# Patient Record
Sex: FEMALE | Race: BLACK OR AFRICAN AMERICAN | NOT HISPANIC OR LATINO | Employment: FULL TIME | URBAN - METROPOLITAN AREA
[De-identification: names, ages, dates, MRNs, and addresses within clinical notes are randomized per-mention and may not be internally consistent; named-entity substitution may affect disease eponyms.]

---

## 2023-03-19 PROBLEM — R05.9 COUGH: Status: ACTIVE | Noted: 2023-03-19

## 2023-03-19 PROBLEM — I10 HTN (HYPERTENSION): Status: ACTIVE | Noted: 2023-03-19

## 2023-03-19 PROBLEM — M51.379 DEGENERATIVE DISC DISEASE AT L5-S1 LEVEL: Status: ACTIVE | Noted: 2023-03-19

## 2023-03-19 PROBLEM — N92.6 IRREGULAR MENSTRUAL CYCLE: Status: ACTIVE | Noted: 2023-03-19

## 2023-03-19 PROBLEM — N32.81 OVERACTIVE BLADDER: Status: ACTIVE | Noted: 2023-03-19

## 2023-03-19 PROBLEM — M25.552 HIP PAIN, LEFT: Status: ACTIVE | Noted: 2023-03-19

## 2023-03-19 PROBLEM — G89.29 CHRONIC LEFT-SIDED LOW BACK PAIN WITHOUT SCIATICA: Status: ACTIVE | Noted: 2023-03-19

## 2023-03-19 PROBLEM — D50.9 IRON DEFICIENCY ANEMIA: Status: ACTIVE | Noted: 2023-03-19

## 2023-03-19 PROBLEM — M25.562 KNEE PAIN, BILATERAL: Status: ACTIVE | Noted: 2023-03-19

## 2023-03-19 PROBLEM — M51.37 DEGENERATIVE DISC DISEASE AT L5-S1 LEVEL: Status: ACTIVE | Noted: 2023-03-19

## 2023-03-19 PROBLEM — K21.9 GERD (GASTROESOPHAGEAL REFLUX DISEASE): Status: ACTIVE | Noted: 2023-03-19

## 2023-03-19 PROBLEM — M17.11 PATELLOFEMORAL ARTHRITIS OF RIGHT KNEE: Status: ACTIVE | Noted: 2023-03-19

## 2023-03-19 PROBLEM — M54.16 LEFT LUMBAR RADICULOPATHY: Status: ACTIVE | Noted: 2023-03-19

## 2023-03-19 PROBLEM — N39.41 URGE INCONTINENCE OF URINE: Status: ACTIVE | Noted: 2023-03-19

## 2023-03-19 PROBLEM — M54.12 CERVICAL RADICULOPATHY: Status: ACTIVE | Noted: 2023-03-19

## 2023-03-19 PROBLEM — G43.909 MIGRAINE: Status: ACTIVE | Noted: 2023-03-19

## 2023-03-19 PROBLEM — R06.09 OTHER FORMS OF DYSPNEA: Status: ACTIVE | Noted: 2023-03-19

## 2023-03-19 PROBLEM — E66.813 OBESITY, CLASS III, BMI 40-49.9 (MORBID OBESITY): Status: ACTIVE | Noted: 2023-03-19

## 2023-03-19 PROBLEM — R39.15 URINARY URGENCY: Status: ACTIVE | Noted: 2023-03-19

## 2023-03-19 PROBLEM — Z86.2 HISTORY OF ANEMIA: Status: ACTIVE | Noted: 2023-03-19

## 2023-03-19 PROBLEM — M54.2 NECK PAIN: Status: ACTIVE | Noted: 2023-03-19

## 2023-03-19 PROBLEM — M25.512 LEFT SHOULDER PAIN: Status: ACTIVE | Noted: 2023-03-19

## 2023-03-19 PROBLEM — M25.561 KNEE PAIN, BILATERAL: Status: ACTIVE | Noted: 2023-03-19

## 2023-03-19 PROBLEM — A59.9 TRICHOMONAS INFECTION: Status: ACTIVE | Noted: 2023-03-19

## 2023-03-19 PROBLEM — M54.50 CHRONIC LEFT-SIDED LOW BACK PAIN WITHOUT SCIATICA: Status: ACTIVE | Noted: 2023-03-19

## 2023-03-19 PROBLEM — R35.1 NOCTURIA: Status: ACTIVE | Noted: 2023-03-19

## 2023-03-19 PROBLEM — M54.32 LEFT SIDED SCIATICA: Status: ACTIVE | Noted: 2023-03-19

## 2023-03-19 PROBLEM — R25.2 MUSCLE CRAMP: Status: ACTIVE | Noted: 2023-03-19
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
## 2023-03-19 PROBLEM — M17.12 PATELLOFEMORAL ARTHRITIS OF LEFT KNEE: Status: ACTIVE | Noted: 2023-03-19

## 2023-03-19 PROBLEM — R06.09 CHRONIC DYSPNEA: Status: ACTIVE | Noted: 2023-03-19

## 2023-03-19 PROBLEM — R35.0 URINARY FREQUENCY: Status: ACTIVE | Noted: 2023-03-19

## 2023-03-19 PROBLEM — E66.01 OBESITY, CLASS III, BMI 40-49.9 (MORBID OBESITY) (MULTI): Status: ACTIVE | Noted: 2023-03-19

## 2023-03-19 PROBLEM — R06.09 OTHER FORMS OF DYSPNEA: Status: RESOLVED | Noted: 2023-03-19 | Resolved: 2023-03-19

## 2023-03-19 RX ORDER — ALBUTEROL SULFATE 90 UG/1
1-2 AEROSOL, METERED RESPIRATORY (INHALATION)
COMMUNITY
Start: 2022-02-24 | End: 2024-03-20 | Stop reason: WASHOUT

## 2023-03-19 RX ORDER — FERROUS SULFATE 325(65) MG
1 TABLET ORAL EVERY OTHER DAY
COMMUNITY
Start: 2022-08-22

## 2023-03-19 RX ORDER — DULOXETIN HYDROCHLORIDE 60 MG/1
1 CAPSULE, DELAYED RELEASE ORAL EVERY EVENING
COMMUNITY
End: 2024-03-20 | Stop reason: WASHOUT

## 2023-03-19 RX ORDER — NAPROXEN 500 MG/1
1 TABLET ORAL EVERY 12 HOURS PRN
COMMUNITY
Start: 2022-07-11 | End: 2023-11-20 | Stop reason: ALTCHOICE

## 2023-03-19 RX ORDER — CYCLOBENZAPRINE HCL 10 MG
1 TABLET ORAL DAILY PRN
COMMUNITY
Start: 2022-01-25 | End: 2024-03-15 | Stop reason: SDUPTHER

## 2023-03-19 RX ORDER — SUMATRIPTAN 50 MG/1
1 TABLET, FILM COATED ORAL
COMMUNITY
End: 2023-11-20 | Stop reason: ALTCHOICE

## 2023-03-19 RX ORDER — TOPIRAMATE 25 MG/1
TABLET ORAL
COMMUNITY
End: 2024-03-20 | Stop reason: ALTCHOICE

## 2023-03-19 RX ORDER — CHLORTHALIDONE 25 MG/1
1 TABLET ORAL DAILY
COMMUNITY
Start: 2018-04-12 | End: 2024-05-23 | Stop reason: SDUPTHER

## 2023-03-19 RX ORDER — OMEPRAZOLE 20 MG/1
1 CAPSULE, DELAYED RELEASE ORAL
COMMUNITY
Start: 2022-07-11

## 2023-03-19 RX ORDER — MIRABEGRON 50 MG/1
1 TABLET, EXTENDED RELEASE ORAL DAILY
COMMUNITY
Start: 2022-06-22 | End: 2023-11-20 | Stop reason: ALTCHOICE

## 2023-03-27 ENCOUNTER — APPOINTMENT (OUTPATIENT)
Dept: PRIMARY CARE | Facility: CLINIC | Age: 38
End: 2023-03-27
Payer: COMMERCIAL

## 2023-04-17 ENCOUNTER — OFFICE VISIT (OUTPATIENT)
Dept: PRIMARY CARE | Facility: CLINIC | Age: 38
End: 2023-04-17
Payer: COMMERCIAL

## 2023-04-17 VITALS
OXYGEN SATURATION: 100 % | DIASTOLIC BLOOD PRESSURE: 76 MMHG | TEMPERATURE: 98.3 F | WEIGHT: 268.2 LBS | SYSTOLIC BLOOD PRESSURE: 109 MMHG | HEART RATE: 83 BPM | BODY MASS INDEX: 44.68 KG/M2 | HEIGHT: 65 IN

## 2023-04-17 DIAGNOSIS — G89.29 CHRONIC BILATERAL LOW BACK PAIN WITH BILATERAL SCIATICA: Chronic | ICD-10-CM

## 2023-04-17 DIAGNOSIS — Z12.31 ENCOUNTER FOR SCREENING MAMMOGRAM FOR MALIGNANT NEOPLASM OF BREAST: ICD-10-CM

## 2023-04-17 DIAGNOSIS — M54.42 CHRONIC BILATERAL LOW BACK PAIN WITH BILATERAL SCIATICA: Chronic | ICD-10-CM

## 2023-04-17 DIAGNOSIS — M48.07 SPINAL STENOSIS OF LUMBOSACRAL REGION: Primary | ICD-10-CM

## 2023-04-17 DIAGNOSIS — M54.41 CHRONIC BILATERAL LOW BACK PAIN WITH BILATERAL SCIATICA: Chronic | ICD-10-CM

## 2023-04-17 PROCEDURE — 3074F SYST BP LT 130 MM HG: CPT | Performed by: STUDENT IN AN ORGANIZED HEALTH CARE EDUCATION/TRAINING PROGRAM

## 2023-04-17 PROCEDURE — 99214 OFFICE O/P EST MOD 30 MIN: CPT | Performed by: STUDENT IN AN ORGANIZED HEALTH CARE EDUCATION/TRAINING PROGRAM

## 2023-04-17 PROCEDURE — 3078F DIAST BP <80 MM HG: CPT | Performed by: STUDENT IN AN ORGANIZED HEALTH CARE EDUCATION/TRAINING PROGRAM

## 2023-04-17 RX ORDER — BIOTIN 10000 MCG
TABLET,CHEWABLE ORAL
COMMUNITY
Start: 2022-05-30 | End: 2024-03-20 | Stop reason: WASHOUT

## 2023-04-17 RX ORDER — ACETAMINOPHEN 325 MG/1
TABLET ORAL EVERY 6 HOURS
COMMUNITY
Start: 2020-08-25 | End: 2024-03-20 | Stop reason: WASHOUT

## 2023-04-17 RX ORDER — MELOXICAM 7.5 MG/1
TABLET ORAL
COMMUNITY
Start: 2023-04-16 | End: 2024-03-20 | Stop reason: WASHOUT

## 2023-04-17 ASSESSMENT — PAIN SCALES - GENERAL: PAINLEVEL: 9

## 2023-04-18 ASSESSMENT — ENCOUNTER SYMPTOMS
LIGHT-HEADEDNESS: 0
CHILLS: 0
SORE THROAT: 0
VOMITING: 0
COUGH: 0
BACK PAIN: 1
SHORTNESS OF BREATH: 0
MYALGIAS: 1
RHINORRHEA: 0
FEVER: 0
DIZZINESS: 0
HEADACHES: 0
NAUSEA: 0
ARTHRALGIAS: 1
JOINT SWELLING: 0
ABDOMINAL PAIN: 0
DIARRHEA: 0

## 2023-04-19 NOTE — PROGRESS NOTES
I saw and evaluated the patient. I personally obtained the key and critical portions of the history and physical exam or was physically present for key and critical portions performed by the resident/fellow. I reviewed the resident/fellow's documentation and discussed the patient with the resident/fellow. I agree with the resident/fellow's medical decision making as documented in the note.    May Haskins MD
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
## 2023-05-12 ENCOUNTER — TELEPHONE (OUTPATIENT)
Dept: PRIMARY CARE | Facility: CLINIC | Age: 38
End: 2023-05-12
Payer: COMMERCIAL

## 2023-05-12 NOTE — TELEPHONE ENCOUNTER
Patient called in stating that she is having back spasms as well as chest pain when she lifts her right arm up. Patient would like a call back with a medication script or advice.   [phone]

## 2023-07-21 DIAGNOSIS — E66.01 CLASS 3 SEVERE OBESITY DUE TO EXCESS CALORIES WITHOUT SERIOUS COMORBIDITY WITH BODY MASS INDEX (BMI) OF 40.0 TO 44.9 IN ADULT (MULTI): Primary | ICD-10-CM

## 2023-07-26 DIAGNOSIS — E66.01 CLASS 3 SEVERE OBESITY DUE TO EXCESS CALORIES WITHOUT SERIOUS COMORBIDITY WITH BODY MASS INDEX (BMI) OF 40.0 TO 44.9 IN ADULT (MULTI): Primary | ICD-10-CM

## 2023-09-19 ENCOUNTER — TELEPHONE (OUTPATIENT)
Dept: PRIMARY CARE | Facility: CLINIC | Age: 38
End: 2023-09-19

## 2023-10-03 ENCOUNTER — NUTRITION (OUTPATIENT)
Dept: NUTRITION | Facility: HOSPITAL | Age: 38
End: 2023-10-03
Payer: COMMERCIAL

## 2023-10-03 DIAGNOSIS — Z71.3 NUTRITIONAL COUNSELING: ICD-10-CM

## 2023-10-03 DIAGNOSIS — E66.01 CLASS 3 SEVERE OBESITY DUE TO EXCESS CALORIES IN ADULT, UNSPECIFIED BMI, UNSPECIFIED WHETHER SERIOUS COMORBIDITY PRESENT (MULTI): Primary | ICD-10-CM

## 2023-10-03 PROCEDURE — 97803 MED NUTRITION INDIV SUBSEQ: CPT | Mod: GT | Performed by: STUDENT IN AN ORGANIZED HEALTH CARE EDUCATION/TRAINING PROGRAM

## 2023-10-03 PROCEDURE — 97803 MED NUTRITION INDIV SUBSEQ: CPT | Performed by: STUDENT IN AN ORGANIZED HEALTH CARE EDUCATION/TRAINING PROGRAM

## 2023-10-03 NOTE — PROGRESS NOTES
Follow up from initial visit on 8/29/2023.    Initial visit:  Pt is a 38 year old female seen for weight reduction .   Pt states that she is interested in weight loss surgery. Has appointment with weight loss clinic in November. Has been cutting back on fried foods and soda, drinking more water, trying to portion control, and eating less fast food. Has not lost any weight since changes. Has not tried diets in the past. Tries not to eat after 8pm.      B: often skips; on the weekends will eat turkey sausage/coronado, grits/oatmeal, and eggs  L: tuna with crackers, chips/pretzels; salads   Snacks: chips, fruit snack, sweets, microwave popcorn   D: chicken/seafood with pasta or rice, limits beef/pork, veggies   Beverages: water, soda sometimes (2-3 per day in the past), green tea with sugar, coffee sometimes      Instructed on counting macronutrient intake, proper portion sizes, label reading, and general healthful nutrition. Both verbal and written education provided in the one-on-one setting.     Follow up:  Pt states that her eating habits have been mostly the same. Snacking on fruit, nuts, and popcorn. Eating mostly chicken/seafood for protein. Has not weighted herself but feels like her clothes are fitting looser.        Pt goals:  1. Follow plate method when planning meals (1/2 plate non-starchy vegetables, 1/4 plate lean protein, 1/4 plate carbohydrate) - met  2. Start tracking calories - 1700 per day- states that she tied but didn't like the heather  3. Increase fiber from fruits, vegetables, whole grains, and beans/lentils - met  4. Choose lean protein sources including chicken, turkey, seafood, and eggs. Aim to include more plant-based sources of protein including tofu, beans, lentils, nuts, nut butters, and pea protein. - met    Patient voiced understanding of information. All questions answered    Pt declined further follow up at this time.

## 2023-11-07 ENCOUNTER — TELEPHONE (OUTPATIENT)
Dept: SURGERY | Facility: HOSPITAL | Age: 38
End: 2023-11-07
Payer: COMMERCIAL

## 2023-11-07 PROBLEM — Z01.818 PREOPERATIVE CLEARANCE: Status: ACTIVE | Noted: 2023-11-07

## 2023-11-07 PROBLEM — Z98.84 BARIATRIC SURGERY STATUS: Status: ACTIVE | Noted: 2023-11-07

## 2023-11-07 NOTE — TELEPHONE ENCOUNTER
Spoke with patient regarding upcoming virtual visit on 11/14/2023, nurse contact number given for anyh further questions/concerns.

## 2023-11-07 NOTE — PROGRESS NOTES
"Subjective   Date: 11/7/2023 Time: 11:16 AM  Name: Анна Bird  MRN: [id number]  This is a 38 y.o. female with morbid obesity stated kblodo=288 lbs, BMI=44(There is no height or weight on file to calculate BMI.) who presents to clinic for consideration of bariatric surgery. she has attempted and failed multiple diet and exercise regimens for weight loss. Initial Onset of obesity was  entire life .  Their goal for surgery is to  be healthier  and lose weight. The patient has tried multiple diets to lose weight including Low Carb, Exercise, Low Fat, and Low Calorie. The patient was most successful with the Exercise . The most pounds lost on this diet were 6 lbs. The patient considers their dietary weakness to be  carbs, fast food, portion size, sweets, late night snacking, emotional/stress eater.  The patient reports a  highest weight ever of 265 pounds and lowest weight ever of 200 pounds Distribution of Obesity: is general. Current diet:  2000+ Calories Per Day, no specific diet. Compliance: Poor Adherence Diet Problems: High Caloric Intake, Insufficient Dairy Products, Insufficient Fruit, Insufficient Vegetables, Insufficient Protein Foods, Needs Less Junk Food, Needs Less Fat, More Fiber, High in Fat Content, Low in Fiber, High in Cholesterol, and High in Sugar } Dietary Details Include:Dietary Details: 0 Servings of Fruit/Day, 1 Servings of Vegetables/Day, 2 Servings of Meat/Day, 0 Servings of Whole Grains/Day, 5 \"a lot\" Servings of Simple Carbs/Day, 80 Ounces of Water/Day , 1 Servings of Dairy/Day, 1, 12 oz Cups of Coffee/Day, 0 Cups of Tea/Day, 16 oz QOD Cans of Regular Soda/Day, and 0 Cans of Diet Soda/Day The patient exercises daily  20 Minutes/Day Types of Exercise : walking    Comorbidities: anxiety, back paintakes NSAIDS, depressed mood, joint paintakes NSAIDs, knee paintakes NSAIDs, urinary incontinence, and hypertension controlled with oral meds        Patient would like to discuss WLS options with " provider    PMH:   Past Medical History:   Diagnosis Date    Abnormal chromosomal and genetic finding on  screening of mother 2016    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    Chondrocostal junction syndrome (tietze) 2015    Costochondritis    Encounter for  screening for Streptococcus B      screening for streptococcus B    Encounter for routine checking of intrauterine contraceptive device 2016    Intrauterine device surveillance    Encounter for routine postpartum follow-up 2017    Normal postpartum course    Encounter for screening for diseases of the blood and blood-forming organs and certain disorders involving the immune mechanism 2015    Screening for deficiency anemia    Encounter for supervision of other normal pregnancy, unspecified trimester 2020    Prenatal care, subsequent pregnancy    Nocturia 2016    Nocturia    Obesity complicating pregnancy, unspecified trimester 2017    Obesity in pregnancy    Other specified abnormal immunological findings in serum 2015    Positive Helicobacter pylori serology    Other specified personal risk factors, not elsewhere classified     At risk of UTI    Personal history of other diseases of the musculoskeletal system and connective tissue 2015    History of chronic back pain    Personal history of other endocrine, nutritional and metabolic disease 10/29/2013    History of vitamin D deficiency    Personal history of other specified conditions 2014    History of insomnia    Personal history of other specified conditions 10/24/2013    History of urinary frequency    Personal history of other specified conditions 2016    History of dysuria    Personal history of other specified conditions 2018    History of dysuria    Retention of urine, unspecified 2020    Incomplete emptying of bladder    Supervision of pregnancy with history of pre-term labor,  unspecified trimester 2017    Previous  delivery, antepartum    Unspecified pre-existing hypertension complicating pregnancy, unspecified trimester 2017    Chronic hypertension in pregnancy    Urge incontinence     Sensory urge incontinence        PSH:   Past Surgical History:   Procedure Laterality Date    CHOLECYSTECTOMY  2013    Cholecystectomy Laparoscopic        1 = Symptoms noticeable but not bothersome no GERD voiced    No personal/family hx of VTE.        FAMILY HISTORY:  Family History   Problem Relation Name Age of Onset    Hypertension Mother      Cancer Mother      Diabetes Mother      Hypertension Father      Cancer Paternal Grandmother          SOCIAL HISTORY:  Social History     Tobacco Use    Smoking status: Never    Smokeless tobacco: Current   Substance Use Topics    Alcohol use: Yes    Drug use: Never       MEDICATIONS:  Prior to Admission Medications:  Medication Documentation Review Audit       Reviewed by Doug Dueñas MA (Medical Assistant) on 23 at 1504      Medication Order Taking? Sig Documenting Provider Last Dose Status   8HR Muscle Aches-Pain 650 mg ER tablet [id number] Yes 1 TABLET EVERY 6-8 HOURS AS NEEDED FOR PAIN (DO NOT EXCEED 5 TABLETS IN 1 DAY) Historical Provider, MD  Active   acetaminophen (Tylenol) 325 mg tablet [id number] Yes Take by mouth every 6 hours. Historical Provider, MD  Active   albuterol 90 mcg/actuation inhaler [id number] Yes Inhale 1-2 puffs. Every 4 to 6 hours as needed Historical Provider, MD Taking Active   chlorthalidone (Hygroton) 25 mg tablet [id number] Yes Take 1 tablet (25 mg) by mouth once daily. Historical Provider, MD Taking Active   cyclobenzaprine (Flexeril) 10 mg tablet [id number] Yes Take 1 tablet (10 mg) by mouth once daily as needed. Historical Provider, MD Taking Active   diclofenac sodium 1 % kit [id number] Yes Apply topically once daily. Apply sparingly to affected area(s) Historical Provider, MD Taking Active   DULoxetine  (Cymbalta) 60 mg DR capsule [id number] Yes Take 1 capsule (60 mg) by mouth once daily in the evening. After 1 week of 30 mg capsules.  Do not crush or chew. Historical MD Candice Taking Active   ferrous sulfate 325 (65 Fe) MG tablet [id number] Yes Take 1 tablet (325 mg) by mouth every other day. Without food to increase absorption Historical MD Candice Taking Active   meloxicam (Mobic) 7.5 mg tablet [id number] Yes  Historical Provider, MD  Active   mirabegron (Myrbetriq) 50 mg tablet extended release 24 hr 24 hr tablet [id number] No Take 1 tablet (50 mg) by mouth once daily. Historical Provider, MD Not Taking Active   naproxen (Naprosyn) 500 mg tablet [id number] Yes Take 1 tablet (500 mg) by mouth every 12 hours if needed. Beatrice Harvey MD Taking Active   omeprazole (PriLOSEC) 20 mg DR capsule [id number] No Take 1 capsule (20 mg) by mouth once daily with a meal. Beatrice Harvey MD Not Taking Active   SUMAtriptan (Imitrex) 50 mg tablet [id number] Yes Take 1 tablet (50 mg) by mouth. For migraine relief (as soon as headache starts) May repeat dose every 2 hours. Max 200mg/day (4 pills/day) Beatrice Harvey MD Taking Active   topiramate (Topamax) 25 mg tablet [id number] Yes Take by mouth. Take per titration, fax to pharmacy. Quantity: 196 tabs Historical MD Candice Taking Active                     ALLERGIES:  No Known Allergies    REVIEW OF SYSTEMS:  GENERAL: Negative for malaise, significant weight loss and fever  HEAD: Negative for headache, swelling.  NECK: Negative for lumps, goiter, pain and significant neck swelling  RESPIRATORY: Negative for cough, wheezing or shortness of breath.  CARDIOVASCULAR: Negative for chest pain, leg swelling or palpitations.  GI: Negative for abdominal discomfort, blood in stools or black stools or change in bowel habits  : No history of dysuria, frequency or incontinence  MUSCULOSKELETAL: Negative for joint pain or swelling, back pain or muscle pain.  SKIN: Negative for  lesions, rash, and itching.  PSYCH: Negative for sleep disturbance, mood disorder and recent psychosocial stressors.  ENDOCRINE: Negative for cold or heat intolerance, polyuria, polydipsia and goiter.    Objective   PHYSICAL EXAM:  Visit Vitals  Smoking Status Never       IMPRESSION:  Анна Bird is a 38 y.o. female with a bmi of Body mass index is 44.76 kg/m². with the following diagnoses and co-morbidities:      We discussed the SG/GBP  at [facility] and all questions were answered. risks and benefits were discussed  The patient understands the risks and benefits of the procedure and how the procedure is performed. The patient understands the risks include but are not limited to bleeding, infection, DVT, PE, pneumonia, myocardial infarction, leak along the staple lines, and weight regain. We discussed lifestyle changes necessary to be successful.      She wants to proceed with SG     This patient does meet the criteria for a surgical weight loss procedure according to NIH guidelines.  The risks of sleeve gastrectomy, [place]-en-Y gastric bypass, and duodenal switch surgery including bleeding, leak, wound infection, dehydration, ulcers, internal hernia, DVT/PE, prolonged nausea/vomiting, incomplete resolution of associated medical conditions, reflux, weight regain, vitamin/mineral deficiencies, and death have been explained to the patient and Анна Bird has expressed understanding and acceptance of them.     The increased risk of substance and alcohol abuse following bariatric surgery was discussed with the patient, along with the negative consequences of substance/alcohol use after surgery including addiction, worsening of mental health disorders, and injury to the stomach. The risk of smoking and vaping (tobacco or any other substance) after bariatric surgery was explained to the patient. This includes risk of anastamotic ulcers, gastritis, bleeding, perforation, stricture, and PO intolerance.  The patient  expressed understanding and acceptance of these risks.      The benefits of the above surgeries including weight loss, improvement/resolution of associated medical and mental health conditions, improved mobility, and decreased mortality have been explained the the patient and Анна Bird has expressed understanding and acceptance of them.  Assessment/Plan   PLAN:  The plan of treatment for Анна Bird is to continue with the consultations and tests ordered today in hopes of qualifying for pre-operative clearance for bariatric surgery. This includes:    Consult Nutrition for education   Consult Psychology  Consult Cardiology  Labs completed today  EGD  Consult Sleep Medicine - concern for BARBARA  Recommend at least 15 lbs of weight loss prior to surgery.  Additional consults/testing: as needed    Planned procedure: Sleeve Gastrectomy      The following are some lifestyle changes you should begin to prepare you for your SG surgery.   Eliminate soda and other carbonated beverages from your diet. Carbonation will not be well tolerated after surgery. Try Propel, Vitamin Water Zero, Sobe Lifewater, Crystal Light or water.    Increase fluid consumption to 64 oz daily. Do not drink within 30 minutes of eating as this will liquefy your food and make you hungry more quickly.    Exercise for 30-60 minutes daily. Brisk walking, bike riding and swimming are all examples of healthy exercise. If you are unable to exercise we recommend seated exercise.    Do not skip meals.    Take a multivitamin daily.    Lose weight. In preparation for your surgery it is important that you begin making healthier food choices now. Our dietitian will meet with you to help you select foods lower in calories and higher in nutrition. We would like you to lose at least 15  lbs prior to surgery.     Increase your protein intake to 60 grams per day.    Alcohol is empty calories. Please eliminate while preparing for surgery.    Plan your meals.       General Instruction: 1) Use the information we gave you today to work through your insurance requirements and medical clearances.   2) These documents need to get faxed to the program navigators so they can submit them for approval from your insurance company.   3) Obtain labs today at a  facility. We will call you with any abnormalities and corrections you need to make.   4) Continue to work with your primary care doctor and other specialist so your other health problems are well controlled prior to your surgery.   5) Adopt the recommendations of the program dietician so you develop healthy eating patterns.   6) Work with the sleep team to get your sleep apnea treated to prevent other health problems .   7) Consider attending a support group to learn from other who have been through the process.   8) Come to the [facility] sessions.   45 minutes were spent with patient including history, physical exam, and education.

## 2023-11-14 ENCOUNTER — NUTRITION (OUTPATIENT)
Dept: SURGERY | Facility: HOSPITAL | Age: 38
End: 2023-11-14
Payer: COMMERCIAL

## 2023-11-14 ENCOUNTER — TELEMEDICINE (OUTPATIENT)
Dept: SURGERY | Facility: HOSPITAL | Age: 38
End: 2023-11-14
Payer: COMMERCIAL

## 2023-11-14 VITALS — WEIGHT: 268 LBS | BODY MASS INDEX: 44.6 KG/M2

## 2023-11-14 VITALS — BODY MASS INDEX: 44.76 KG/M2 | WEIGHT: 268.96 LBS

## 2023-11-14 DIAGNOSIS — Z98.84 BARIATRIC SURGERY STATUS: Primary | ICD-10-CM

## 2023-11-14 DIAGNOSIS — E66.01 MORBID OBESITY (MULTI): ICD-10-CM

## 2023-11-14 DIAGNOSIS — Z98.84 BARIATRIC SURGERY STATUS: ICD-10-CM

## 2023-11-14 DIAGNOSIS — E66.01 OBESITY, CLASS III, BMI 40-49.9 (MORBID OBESITY) (MULTI): Primary | ICD-10-CM

## 2023-11-14 DIAGNOSIS — Z01.818 PREOPERATIVE CLEARANCE: ICD-10-CM

## 2023-11-14 PROCEDURE — 99213 OFFICE O/P EST LOW 20 MIN: CPT | Mod: GT | Performed by: SURGERY

## 2023-11-14 PROCEDURE — 99203 OFFICE O/P NEW LOW 30 MIN: CPT | Performed by: SURGERY

## 2023-11-14 NOTE — PROGRESS NOTES
Surgeon: Lex  Patient is considering:  Sleeve gastrectomy    ASSESSMENT:  Current weight:  268 lbs   Ht: 65 in    BMI: 44.6       Initial start weight: 268 lbs  Pre-Op Excess Body Weight (EBW):   118 lbs  Target Post-Op weight goal:  191 - 209 lbs    Food allergies/intolerances:  NKFA  Chewing/Swallowing/Dentition:  none   Nausea / Vomiting / Hx Gastroparesis:  none  Diarrhea/ Constipation: none  Exercise level:  none  Smoking/Tobacco use: none  Vitamins/Minerals supplements:  none  Medications:   see list  Past diet attempts:   low CHO, exercise, low fat, low calorie, RD monitored - 1700 calorie meal plan  Hours of sleep/night: 7    24 HOUR RECALL/DIET HISTORY:  Breakfast:  oatmeal, water  Snack:  PB crackers, or bag of chips, or candy bar   Lunch: chicken noodle soup  Snack:   Dinner: chicken, broccoli, starch   Snack:   Beverages: water 50-60 oz, coffee 1-2 times/week, gingerale occasionally   Alcohol: wine, occasionally     Person responsible for cooking & shopping? Self   How often do you eat sweet snacks?  3 times/week   How often do you eat savory snacks?  PB crackers daily   How often do you eat out?  1/week  Do you feel overly stuffed?  Sometimes   Binge Eating? No  Night Eating?  No   Emotional Eating? Yes, often, triggered by sadness/depression        READINESS TO LEARN:  Motivation to learn: Interested        Understanding of instruction: Good    Anticipated Compliance: Good    Family Support: Good    Educational Materials Provided:    Plate Method  High Protein Snack List  High Protein Drink  High Protein food list  Schedules for MSWL class   Goals sheet    Appointment was conducted via phone d/t COVID-19 protocol. Patient's weight is self-reported. Patient will scheduled to attend a Virtual Education Class to review the 2 week Pre-op diet, Post op fluid, protein, vitamin/mineral supplementation, exercise goals and post op diet progression.    Patient presents with excessive calorie obesity seeking   sleeve gastrectomy.     Patient seen today to complete nutrition evaluation for WLS. Patient reports trying multiple attempts in the past to lose weight. She identifies with snacking, emotional eating, and lack of exercise. Patient Drinks mostly water, per other RD notes - has eliminated pop and high calorie beverages. Patient eats 3 meals/day, often snacking b/w meals.   Recommended to continue to eat 3 meals/day and balance meals with high protein foods to add satiety and reduce snacking. Encouraged to select high protein/fiber snacks as needed. Reviewed fluids to eliminate and encouraged to drink 64 oz/day of water or SF fluids. Reviewed postop behaviors to begin practicing. Recommended to begin exercising 30 minutes 3 days/week or start tracking steps. Start MVI daily.     Patient was receptive to nutritional recommendations, asked numerous questions, and verbalized understanding of the weight loss surgery diet.  Patient expressed understanding about the importance of strict dietary compliance post-surgery to avoid nutritional deficiencies and achieve optimal weight loss and verbalized intent to follow dietary recommendations.      Malnutrition Screening:  Significant unintentional weight loss? No  Eating less than 75% of usual intake for more than 2 weeks? No      Nutrition Diagnosis:   1. Overweight/obesity related to excess energy intake as evidenced by BMI = 40 kg/m^2.  2. Food- and nutrition-related knowledge deficit related to lack of prior exposure to surgical weight loss information as evidenced by pt new to surgical program.    Nutrition Interventions:   1. Modify type and amount of food and nutrients within meals and snacks.  2. Comprehensive Nutrition Education    Recommendations:  1. Continue to eat 3 meals/day and limit to 1-2 snacks/day as needed.  2. Eat protein rich foods at each meal to help reduce snacking and keep you feeling trent longer. Aim for 3-4 ounces (20-30 grams) protein per meal,  "and 1-2oz (7-15 grams) per snack.   3. Drink 64oz of calorie-free, caffeine-free, and non-carbonated beverages.   4. Practice no drinking with meals and taking smaller sips in between meals, through out the day.  5. Select snacks that are high in protein and/or fiber - see the list attached from examples.   6. Use the \"Stop and Think\" method when you recognize emotional/head hunger. Create an action list with 5 activities you can do that make you feel happy, relaxed, or calm.   7. Begin daily multivitamin.  [employer] Complete has everything you need.  8. Start tracking your steps daily  OR exercise for 30 minutes 3 days/week.You can go for a walk if the weather is optimal, or check out exercise videos online to do at home.   9. Your insurance requires 6 months of Medically Supervised Weight Loss (MSWL) classes. You have completed 3/6 MSWL visits (including today). You will need to attend a class in December. We will follow up for your 5th visit on Tuesday, January 16th at 2:30 PM. You will need to weigh yourself before this appointment and provide a 24 hour food recall.      Pre-op Goal weight: lose 5% of body weight    Nutrition Monitoring and Evaluation: 1-2 pound weight loss per week  Criteria: weight check  Need for Follow-up:     Patient does meet [employer] Health guidelines for weight loss surgery, however needs to demonstrate consistent effort in making dietary changes before giving clearance. It is anticipated that the patient will need at least    1 nutritional follow-up visits prior to clearance for surgery.    "

## 2023-11-17 DIAGNOSIS — Z98.84 BARIATRIC SURGERY STATUS: ICD-10-CM

## 2023-11-20 ENCOUNTER — OFFICE VISIT (OUTPATIENT)
Dept: OBSTETRICS AND GYNECOLOGY | Facility: HOSPITAL | Age: 38
End: 2023-11-20
Payer: COMMERCIAL

## 2023-11-20 VITALS
HEIGHT: 65 IN | SYSTOLIC BLOOD PRESSURE: 176 MMHG | DIASTOLIC BLOOD PRESSURE: 103 MMHG | BODY MASS INDEX: 43.65 KG/M2 | WEIGHT: 262 LBS

## 2023-11-20 DIAGNOSIS — Z01.419 WELL WOMAN EXAM WITH ROUTINE GYNECOLOGICAL EXAM: Primary | ICD-10-CM

## 2023-11-20 DIAGNOSIS — Z12.4 SCREENING FOR MALIGNANT NEOPLASM OF CERVIX: ICD-10-CM

## 2023-11-20 DIAGNOSIS — Z12.31 SCREENING MAMMOGRAM, ENCOUNTER FOR: ICD-10-CM

## 2023-11-20 PROCEDURE — 88175 CYTOPATH C/V AUTO FLUID REDO: CPT | Mod: TC,GCY | Performed by: OBSTETRICS & GYNECOLOGY

## 2023-11-20 PROCEDURE — 1036F TOBACCO NON-USER: CPT | Performed by: OBSTETRICS & GYNECOLOGY

## 2023-11-20 PROCEDURE — 99386 PREV VISIT NEW AGE 40-64: CPT | Performed by: OBSTETRICS & GYNECOLOGY

## 2023-11-20 PROCEDURE — 3080F DIAST BP >= 90 MM HG: CPT | Performed by: OBSTETRICS & GYNECOLOGY

## 2023-11-20 PROCEDURE — 87624 HPV HI-RISK TYP POOLED RSLT: CPT | Performed by: OBSTETRICS & GYNECOLOGY

## 2023-11-20 PROCEDURE — 3077F SYST BP >= 140 MM HG: CPT | Performed by: OBSTETRICS & GYNECOLOGY

## 2023-11-20 PROCEDURE — 3008F BODY MASS INDEX DOCD: CPT | Performed by: OBSTETRICS & GYNECOLOGY

## 2023-11-20 RX ORDER — ALBUTEROL SULFATE 90 UG/1
2 AEROSOL, METERED RESPIRATORY (INHALATION) EVERY 6 HOURS PRN
COMMUNITY
Start: 2022-02-24 | End: 2024-03-20 | Stop reason: WASHOUT

## 2023-11-20 RX ORDER — LIDOCAINE 50 MG/G
1 PATCH TOPICAL
COMMUNITY
Start: 2021-05-28 | End: 2024-03-20 | Stop reason: WASHOUT

## 2023-11-20 RX ORDER — OXYBUTYNIN CHLORIDE 5 MG/1
5 TABLET ORAL EVERY 12 HOURS
COMMUNITY
Start: 2020-10-24 | End: 2024-03-20 | Stop reason: WASHOUT

## 2023-11-20 RX ORDER — IBUPROFEN 600 MG/1
600 TABLET ORAL
COMMUNITY
Start: 2020-08-25 | End: 2024-03-20 | Stop reason: WASHOUT

## 2023-11-20 ASSESSMENT — PATIENT HEALTH QUESTIONNAIRE - PHQ9
SUM OF ALL RESPONSES TO PHQ9 QUESTIONS 1 AND 2: 0
2. FEELING DOWN, DEPRESSED OR HOPELESS: NOT AT ALL
1. LITTLE INTEREST OR PLEASURE IN DOING THINGS: NOT AT ALL

## 2023-11-20 NOTE — PROGRESS NOTES
Subjective   Patient ID: Анна Bird is a 38 y.o. female who presents for Annual Exam (Pap needed/Mamm needed/Sti testing declined/birth control declined/Chaperone declined).  Annual gyn exam. Due for Pap, mammogram.  CHTN- did not take meds today. Discussed why this is important.   Contraception: TL, Condoms    Review of Systems   All other systems reviewed and are negative.    Objective   Physical Exam  Vitals reviewed.   Constitutional:       Appearance: Normal appearance.   HENT:      Head: Normocephalic and atraumatic.      Right Ear: External ear normal.      Left Ear: External ear normal.      Nose: Nose normal.      Mouth/Throat:      Mouth: Mucous membranes are moist.   Eyes:      Extraocular Movements: Extraocular movements intact.   Neck:      Thyroid: No thyroid mass or thyromegaly.   Cardiovascular:      Rate and Rhythm: Normal rate and regular rhythm.      Heart sounds: Normal heart sounds.   Pulmonary:      Effort: Pulmonary effort is normal.      Breath sounds: Normal breath sounds.   Chest:   Breasts:     Right: Normal.      Left: Normal.   Abdominal:      General: Abdomen is flat. Bowel sounds are normal.      Palpations: Abdomen is soft.      Tenderness: There is no abdominal tenderness. There is no right CVA tenderness or left CVA tenderness.   Genitourinary:     General: Normal vulva.      Exam position: Lithotomy position.      Labia:         Right: No lesion.         Left: No lesion.       Urethra: No prolapse, urethral swelling or urethral lesion.      Vagina: Normal.      Cervix: Normal.      Uterus: Normal.       Adnexa: Right adnexa normal and left adnexa normal.   Musculoskeletal:         General: Normal range of motion.      Right shoulder: Normal.      Left shoulder: Normal.      Cervical back: Normal, normal range of motion and neck supple.      Thoracic back: Normal.      Lumbar back: Normal.      Right hip: Normal.      Left hip: Normal.      Right upper leg: Normal.      Left  upper leg: Normal.      Right knee: Normal.      Left knee: Normal.      Right lower leg: Normal.      Left lower leg: Normal.   Lymphadenopathy:      Upper Body:      Right upper body: No axillary adenopathy.      Left upper body: No axillary adenopathy.      Lower Body: No right inguinal adenopathy. No left inguinal adenopathy.   Skin:     General: Skin is warm and dry.   Neurological:      General: No focal deficit present.      Mental Status: She is alert and oriented to person, place, and time.      Cranial Nerves: Cranial nerves 2-12 are intact.      Motor: Motor function is intact.   Psychiatric:         Attention and Perception: Attention and perception normal.         Mood and Affect: Mood and affect normal.         Behavior: Behavior normal.         Cognition and Memory: Cognition normal.         Judgment: Judgment normal.         Assessment/Plan   Problem List Items Addressed This Visit    None  Visit Diagnoses         Codes    Well woman exam with routine gynecological exam    -  Primary Z01.419    Screening mammogram, encounter for     Z12.31    Relevant Orders    BI mammo bilateral screening tomosynthesis    Screening for malignant neoplasm of cervix     Z12.4    Relevant Orders    THINPREP PAP

## 2023-12-06 ENCOUNTER — TELEPHONE (OUTPATIENT)
Dept: SURGERY | Facility: CLINIC | Age: 38
End: 2023-12-06
Payer: COMMERCIAL

## 2023-12-27 ENCOUNTER — TELEPHONE (OUTPATIENT)
Dept: PULMONOLOGY | Facility: HOSPITAL | Age: 38
End: 2023-12-27
Payer: COMMERCIAL

## 2023-12-27 NOTE — TELEPHONE ENCOUNTER
left vm letting patient know appointment is cancelled due to provider being on leave. left scheduling phone number to reschedule. -ts 12/27/23

## 2024-01-24 ENCOUNTER — APPOINTMENT (OUTPATIENT)
Dept: SLEEP MEDICINE | Facility: CLINIC | Age: 39
End: 2024-01-24
Payer: COMMERCIAL

## 2024-01-29 ENCOUNTER — OFFICE VISIT (OUTPATIENT)
Dept: BEHAVIORAL HEALTH | Facility: CLINIC | Age: 39
End: 2024-01-29
Payer: COMMERCIAL

## 2024-01-29 DIAGNOSIS — E66.01 MORBID OBESITY (MULTI): ICD-10-CM

## 2024-01-29 DIAGNOSIS — Z98.84 BARIATRIC SURGERY STATUS: ICD-10-CM

## 2024-01-29 DIAGNOSIS — Z01.818 PREOPERATIVE CLEARANCE: ICD-10-CM

## 2024-01-29 DIAGNOSIS — F54 PSYCHOLOGICAL FACTOR AFFECTING PHYSICAL CONDITION: ICD-10-CM

## 2024-01-29 PROCEDURE — 1036F TOBACCO NON-USER: CPT | Performed by: PSYCHOLOGIST

## 2024-01-29 PROCEDURE — 90791 PSYCH DIAGNOSTIC EVALUATION: CPT | Performed by: PSYCHOLOGIST

## 2024-01-29 PROCEDURE — 3008F BODY MASS INDEX DOCD: CPT | Performed by: PSYCHOLOGIST

## 2024-01-29 NOTE — LETTER
January 29, 2024     Lenin Burnett MD  [id number]  [facility] [address]    Patient: Davide Kramer   YOB: 1985   Date of Visit: 1/29/2024       Dear Dr. Lenin Burnett MD:    Thank you for referring Davide Kramer to me for evaluation. Below are my notes for this consultation.  If you have questions, please do not hesitate to call me. I look forward to following your patient along with you.       Sincerely,     Kenyetta Hill PsyD      CC: No Recipients  ______________________________________________________________________________________    Start time: 9:00 am  End time: 9:40am    Televideo Informed Consent for psychological evaluation was reviewed with the patient as follows:  There are potential benefits and risks of the use of telephone or video-conferencing that differ from in-person sessions. Specifically, the telephone or televideo system we are using may not be HIPAA compliant and may present limits to patient confidentiality. Confidentiality still applies for telepsychology services, and nobody will record the session without your permission.     Understanding and verbal agreement was attested to by the patient. Patient identity was confirmed using 3 sources, including telephone number, email address and date of birth. Provision of services via telehealth was necessitated by the restrictions on face-to-face visits accompanying the COVID-19 pandemic.    Non-secure Note: The patient has consented to a nonrestricted note.    I had the pleasure of seeing DAVIDE KRAMER at your request for behavioral evaluation for appropriateness for bariatric surgery. As you know, MS. KRAMER is a 39 year old who reports a current weight of 260 lbs pounds and a BMI of approximately 43.    MS. KRAMER stated that she met with the surgeon once and she has met with the dietitian a few times. She is currently working on completing her clearances.     WEIGHT  HISTORY  MS. KRAMER stated that she struggled with her weight since childhood. In high school she stated that she weighed around 200+ lbs and she was sedentary. She stated that stress and depression have impacted her appetite over time leading her to eat more. She stated that when she skips meals, she often tends to eat more later in the day.     She stated that she has not attempted other forms of weight loss. She stated that she has not engaged in any specific dieting or exercise.     MS. KRAMER stated that the dietitian requested she make changes to her exercise, eating habits, dietary choices. She stated that she is working on changing her eating habits but has struggled in various areas. She stated that she is trying to eat more fruits and vegetables. She stated that she will eat apples and oranges but this is sometime difficult at work. She reported that her snacking habits have changed. She stated that she used to snack on chips and chocolate but she has significantly reduced this. She noted that she is not eating as late any more. She reported that she struggles to eat breakfast. She stated that she is aware of the 30-30-30 and attempting to include this in her schedule. She denies drinking pop or other carbonated beverages. She stated that she is drinking water all day (not tracking how much).     She noted that she has lost about 5 lbs since starting this process.     Daily Food Intake:  Breakfast: None  Lunch: Turkey sandwich with cheese and calix  Dinner: Roast, mashed potatoes, greens and corn on the cob  Snacks: Orange, popcicle       PSYCHOSOCIAL HISTORY  MS. KRAMER lives at home with her 6 year-old. She described her parents and her friends as her most significant supports. She stated that they are supportive of her decision to have weight loss surgery.     MS. KRAMER stated that she works at [employer].     MS. KRAMER reported that she is walking for exercise and attempting to get about 2300 steps  per day. She stated that she walked on the track in the past but stopped.     She reported occasional alcohol use (1-2 drinks per week). She denies use of tobacco/nicotine and other substances.     PSYCHOLOGICAL STATUS  MS. KRAMER stated that she has history of experiencing high stress and depressive symptoms. She stated that she has never been formally diagnosed or sought treatment for her symptoms. She stated that she experiences sadness, withdrawal, and fatigue. She stated that she experiences these symptoms about twice per week. She stated that she is currently coming out of a ten year relationship, which has impacted these symptoms.     She stated that she experiences excessive worry, chest pains, difficulty breathing, over-thinking. She stated that when she finds herself worrying she occasionally experiences panic attacks. She stated that she has learned to cope with these by sleeping, going outside for fresh air, sit down, attempt relaxation through distraction.     She reported that being overweight has always impacted her mental health. She stated that she was bullied as a child, which led to depression. She stated that she has not sought services in the past because she was worried about medication.     She stated that she engages in emotional eating, with the most recent experience happening last Thursday. She stated that she was feeling down due to financial stressors, which led to her eating popcorn, ice cream and pizza. She denies binge eating episodes.     She denies SI and history of suicide attempts.     She stated that she has engaged restriction where she has not allowed herself to eat for 1-1.5 days. She stated that she last engaged in this behavior about 2 weeks ago. She denies purging and excessive exercising.     Behavioral issues relevant for candidacy for bariatric surgery include:    1) Motivation:  MS. KRAMER is motivated by a desire to improve her health in order to keep up with her  child and be more active.     2) History of compliance: MS. KRAMER reports no history of problems with compliance with medication regimens. She stated that she is currently taking a multivitamin and a hair skin and nail vitamin. She noted that she takes medication for high BP daily.    3) History of attempts to lose weight:  MS. KRAMER stated that she has not tried alternative forms of weight loss in the past. She denies trying dieting, diet programs or exercise for weight loss.     4) Coping resources and social support: MS. KRAMER reports significant support from family in her pursuit of bariatric surgery. She described some coping for stress and mood management (e.g., taking breaks for relaxation, finding distraction).     5) Ability to maintain behavior post-surgery: In my opinion, MS. KRAMER is in the process of preparing for the behavioral demands that are consequent to bariatric surgery. She stated that she has had some struggles recently in making the changes that are necessary for surgery. She is currently attempting to eat three meals per day but is finding that breakfast is the most difficult meal to include. She stated that she is attempting to include more fruits and vegetables. She has been successful in eliminating pop. She reported that she is attempting to walk more but has not had significant success in increasing her exercise.     6) Psychological contraindications to surgery: A comprehensive review of psychological symptoms reveals that MS. KRAMER is currently struggling with symptoms related to depression and anxiety and emotional eating. She stated that her last instance of emotional eating occurred on Thursday of last week and she ate ice cream and popcorn in response to recent stressors. She denies history of psychiatric treatment. She noted that she is open to therapy but not medication at this time.     IMPRESSIONS    MS. KRAMER and I had discussion around referral to  psychiatric services to help stabilize mood and anxiety and reduce emotional eating prior to surgery. Referral for services was submitted. We will meet again for final clearance following beginning therapy services.     Behavioral confirmation of her candidacy will come in the form of her ability to adhere to follow recommendations made by her dietitian and surgeon, attend therapy appointments, and stabilize mood.    Additionally, we had an extended discussion about behavioral responses to surgery for which she should be vigilant. We discussed the post-surgical risks of mood deterioration, substance misuse, the development of compulsive behaviors and the development of maladaptive coping responses. She expressed understanding of these risks and agreed to contact our office with appropriate haste if any of these maladaptive responses were to develop after surgery.    Thank you for allowing me to collaborate in the evaluation of your patient. Please feel free to contact me if I can provide any additional information.      Mental Status Examination:    Mental Status Exam:  Orientation:  Alert. Oriented x3.  Memory: intact.  Attention/Concentration: Normal/ Good.  Appearance:  Well-groomed. Casually Dressed. Good hygiene.   Behavior/Attitude: Cooperative. Pleasant. Good eye contact.  Motor: Relaxed. Calm. Normal motor activity.   Speech: Regular rate and volume. Fluent. No pressure.   Mood: Depressed and anxious  Affect: Congruent to stated mood.   Thought process: Goal-directed. Organized.  Thought content: No paranoia, delusion or ideas of reference. No AVH   Suicidal ideation: denied.  Homicidal ideation: denied.   Insight: Good  Judgment: Fair  Fund of knowledge: Average      Kenyetta Hill Psy.D.  Pronouns - she  her  hers  Licensed Clinical Psychologist    [facility] [place]   [facility]  Phone: [phone]  [email or web address]

## 2024-01-29 NOTE — PROGRESS NOTES
Start time: 9:00 am  End time: 9:40am    Televideo Informed Consent for psychological evaluation was reviewed with the patient as follows:  There are potential benefits and risks of the use of telephone or video-conferencing that differ from in-person sessions. Specifically, the telephone or televideo system we are using may not be HIPAA compliant and may present limits to patient confidentiality. Confidentiality still applies for telepsychology services, and nobody will record the session without your permission.     Understanding and verbal agreement was attested to by the patient. Patient identity was confirmed using 3 sources, including telephone number, email address and date of birth. Provision of services via telehealth was necessitated by the restrictions on face-to-face visits accompanying the COVID-19 pandemic.    Non-secure Note: The patient has consented to a nonrestricted note.    I had the pleasure of seeing DAVIDE KRAMER at your request for behavioral evaluation for appropriateness for bariatric surgery. As you know, MS. KRAMER is a 39 year old who reports a current weight of 260 lbs pounds and a BMI of approximately 43.    MS. KRAMER stated that she met with the surgeon once and she has met with the dietitian a few times. She is currently working on completing her clearances.     WEIGHT HISTORY  MS. KRAMER stated that she struggled with her weight since childhood. In high school she stated that she weighed around 200+ lbs and she was sedentary. She stated that stress and depression have impacted her appetite over time leading her to eat more. She stated that when she skips meals, she often tends to eat more later in the day.     She stated that she has not attempted other forms of weight loss. She stated that she has not engaged in any specific dieting or exercise.     MS. KRAMER stated that the dietitian requested she make changes to her exercise, eating habits, dietary choices. She  stated that she is working on changing her eating habits but has struggled in various areas. She stated that she is trying to eat more fruits and vegetables. She stated that she will eat apples and oranges but this is sometime difficult at work. She reported that her snacking habits have changed. She stated that she used to snack on chips and chocolate but she has significantly reduced this. She noted that she is not eating as late any more. She reported that she struggles to eat breakfast. She stated that she is aware of the 30-30-30 and attempting to include this in her schedule. She denies drinking pop or other carbonated beverages. She stated that she is drinking water all day (not tracking how much).     She noted that she has lost about 5 lbs since starting this process.     Daily Food Intake:  Breakfast: None  Lunch: Turkey sandwich with cheese and calix  Dinner: Roast, mashed potatoes, greens and corn on the cob  Snacks: Orange, popcicle       PSYCHOSOCIAL HISTORY  MS. KRAMER lives at home with her 6 year-old. She described her parents and her friends as her most significant supports. She stated that they are supportive of her decision to have weight loss surgery.     MS. KRAMER stated that she works at [employer].     MS. KRAMER reported that she is walking for exercise and attempting to get about 2300 steps per day. She stated that she walked on the track in the past but stopped.     She reported occasional alcohol use (1-2 drinks per week). She denies use of tobacco/nicotine and other substances.     PSYCHOLOGICAL STATUS  MS. KRAMER stated that she has history of experiencing high stress and depressive symptoms. She stated that she has never been formally diagnosed or sought treatment for her symptoms. She stated that she experiences sadness, withdrawal, and fatigue. She stated that she experiences these symptoms about twice per week. She stated that she is currently coming out of a ten year relationship,  which has impacted these symptoms.     She stated that she experiences excessive worry, chest pains, difficulty breathing, over-thinking. She stated that when she finds herself worrying she occasionally experiences panic attacks. She stated that she has learned to cope with these by sleeping, going outside for fresh air, sit down, attempt relaxation through distraction.     She reported that being overweight has always impacted her mental health. She stated that she was bullied as a child, which led to depression. She stated that she has not sought services in the past because she was worried about medication.     She stated that she engages in emotional eating, with the most recent experience happening last Thursday. She stated that she was feeling down due to financial stressors, which led to her eating popcorn, ice cream and pizza. She denies binge eating episodes.     She denies SI and history of suicide attempts.     She stated that she has engaged restriction where she has not allowed herself to eat for 1-1.5 days. She stated that she last engaged in this behavior about 2 weeks ago. She denies purging and excessive exercising.     Behavioral issues relevant for candidacy for bariatric surgery include:    1) Motivation:  MS. KRAMER is motivated by a desire to improve her health in order to keep up with her child and be more active.     2) History of compliance: MS. KRAMER reports no history of problems with compliance with medication regimens. She stated that she is currently taking a multivitamin and a hair skin and nail vitamin. She noted that she takes medication for high BP daily.    3) History of attempts to lose weight:  MS. KRAMER stated that she has not tried alternative forms of weight loss in the past. She denies trying dieting, diet programs or exercise for weight loss.     4) Coping resources and social support: MS. KRAMER reports significant support from family in her pursuit of bariatric  surgery. She described some coping for stress and mood management (e.g., taking breaks for relaxation, finding distraction).     5) Ability to maintain behavior post-surgery: In my opinion, MS. KRAMER is in the process of preparing for the behavioral demands that are consequent to bariatric surgery. She stated that she has had some struggles recently in making the changes that are necessary for surgery. She is currently attempting to eat three meals per day but is finding that breakfast is the most difficult meal to include. She stated that she is attempting to include more fruits and vegetables. She has been successful in eliminating pop. She reported that she is attempting to walk more but has not had significant success in increasing her exercise.     6) Psychological contraindications to surgery: A comprehensive review of psychological symptoms reveals that MS. KRAMER is currently struggling with symptoms related to depression and anxiety and emotional eating. She stated that her last instance of emotional eating occurred on Thursday of last week and she ate ice cream and popcorn in response to recent stressors. She denies history of psychiatric treatment. She noted that she is open to therapy but not medication at this time.     IMPRESSIONS    MS. KRAMER and I had discussion around referral to psychiatric services to help stabilize mood and anxiety and reduce emotional eating prior to surgery. Referral for services was submitted. We will meet again for final clearance following beginning therapy services.     Behavioral confirmation of her candidacy will come in the form of her ability to adhere to follow recommendations made by her dietitian and surgeon, attend therapy appointments, and stabilize mood.    Additionally, we had an extended discussion about behavioral responses to surgery for which she should be vigilant. We discussed the post-surgical risks of mood deterioration, substance misuse, the  development of compulsive behaviors and the development of maladaptive coping responses. She expressed understanding of these risks and agreed to contact our office with appropriate haste if any of these maladaptive responses were to develop after surgery.    Thank you for allowing me to collaborate in the evaluation of your patient. Please feel free to contact me if I can provide any additional information.      Mental Status Examination:    Mental Status Exam:  Orientation:  Alert. Oriented x3.  Memory: intact.  Attention/Concentration: Normal/ Good.  Appearance:  Well-groomed. Casually Dressed. Good hygiene.   Behavior/Attitude: Cooperative. Pleasant. Good eye contact.  Motor: Relaxed. Calm. Normal motor activity.   Speech: Regular rate and volume. Fluent. No pressure.   Mood: Depressed and anxious  Affect: Congruent to stated mood.   Thought process: Goal-directed. Organized.  Thought content: No paranoia, delusion or ideas of reference. No AVH   Suicidal ideation: denied.  Homicidal ideation: denied.   Insight: Good  Judgment: Fair  Fund of knowledge: Average      Kenyetta Hill Psy.D.  Pronouns - she  her  hers  Licensed Clinical Psychologist    [facility]   [facility]  Phone: [phone]  [email or web address]

## 2024-03-15 ENCOUNTER — OFFICE VISIT (OUTPATIENT)
Dept: PRIMARY CARE | Facility: CLINIC | Age: 39
End: 2024-03-15
Payer: COMMERCIAL

## 2024-03-15 VITALS
SYSTOLIC BLOOD PRESSURE: 134 MMHG | HEART RATE: 74 BPM | WEIGHT: 278 LBS | HEIGHT: 65 IN | DIASTOLIC BLOOD PRESSURE: 84 MMHG | TEMPERATURE: 97.6 F | BODY MASS INDEX: 46.32 KG/M2 | OXYGEN SATURATION: 100 %

## 2024-03-15 DIAGNOSIS — M48.07 SPINAL STENOSIS OF LUMBOSACRAL REGION: Primary | ICD-10-CM

## 2024-03-15 DIAGNOSIS — M54.16 LEFT LUMBAR RADICULOPATHY: ICD-10-CM

## 2024-03-15 DIAGNOSIS — M54.41 CHRONIC BILATERAL LOW BACK PAIN WITH BILATERAL SCIATICA: ICD-10-CM

## 2024-03-15 DIAGNOSIS — R25.2 MUSCLE CRAMP: ICD-10-CM

## 2024-03-15 DIAGNOSIS — M54.42 CHRONIC BILATERAL LOW BACK PAIN WITH BILATERAL SCIATICA: ICD-10-CM

## 2024-03-15 DIAGNOSIS — G89.29 CHRONIC BILATERAL LOW BACK PAIN WITH BILATERAL SCIATICA: ICD-10-CM

## 2024-03-15 PROCEDURE — 1036F TOBACCO NON-USER: CPT | Performed by: STUDENT IN AN ORGANIZED HEALTH CARE EDUCATION/TRAINING PROGRAM

## 2024-03-15 PROCEDURE — 3075F SYST BP GE 130 - 139MM HG: CPT | Performed by: STUDENT IN AN ORGANIZED HEALTH CARE EDUCATION/TRAINING PROGRAM

## 2024-03-15 PROCEDURE — 3079F DIAST BP 80-89 MM HG: CPT | Performed by: STUDENT IN AN ORGANIZED HEALTH CARE EDUCATION/TRAINING PROGRAM

## 2024-03-15 PROCEDURE — 99213 OFFICE O/P EST LOW 20 MIN: CPT | Performed by: STUDENT IN AN ORGANIZED HEALTH CARE EDUCATION/TRAINING PROGRAM

## 2024-03-15 PROCEDURE — 3008F BODY MASS INDEX DOCD: CPT | Performed by: STUDENT IN AN ORGANIZED HEALTH CARE EDUCATION/TRAINING PROGRAM

## 2024-03-15 RX ORDER — DULOXETIN HYDROCHLORIDE 30 MG/1
CAPSULE, DELAYED RELEASE ORAL
Qty: 60 CAPSULE | Refills: 3 | Status: SHIPPED | OUTPATIENT
Start: 2024-03-15

## 2024-03-15 RX ORDER — CYCLOBENZAPRINE HCL 10 MG
10 TABLET ORAL 3 TIMES DAILY PRN
Qty: 90 TABLET | Refills: 11 | Status: SHIPPED | OUTPATIENT
Start: 2024-03-15

## 2024-03-15 ASSESSMENT — PAIN SCALES - GENERAL: PAINLEVEL: 10-WORST PAIN EVER

## 2024-03-15 NOTE — PROGRESS NOTES
"Subjective   Patient ID: Анна Bird is a 39 y.o. female who presents for Follow-up and Med Refill.    HPI    #Spinal Stenosis  - worsening back pain s/p fall from metal stairs  - states medications are not helpful, wants something else for pain control  - no saddle anesthesia but notices pain traveling down leg and lower abdomen and that she is peeing more; state pain affecting her bladder  - states flexeril helps by putting her to sleep      Review of Systems   All other systems reviewed and are negative.      Objective   /84 (BP Location: Left arm, Patient Position: Sitting, BP Cuff Size: Large adult)   Pulse 74   Temp 36.4 °C (97.6 °F) (Temporal)   Ht 1.651 m (5' 5\")   Wt 126 kg (278 lb)   SpO2 100%   BMI 46.26 kg/m²      Physical Exam  Constitutional:       General: She is not in acute distress.  HENT:      Head: Normocephalic and atraumatic.   Eyes:      Conjunctiva/sclera: Conjunctivae normal.   Cardiovascular:      Heart sounds: Normal heart sounds. No murmur heard.  Pulmonary:      Effort: Pulmonary effort is normal.      Breath sounds: Normal breath sounds.   Abdominal:      Palpations: Abdomen is soft.   Musculoskeletal:         General: Normal range of motion.   Neurological:      Mental Status: She is alert and oriented to person, place, and time.      Comments: CN 2-12 grossly intact   Psychiatric:         Mood and Affect: Mood normal.       Assessment/Plan   Diagnoses and all orders for this visit:  Spinal stenosis of lumbosacral region  Comments:  Chronic, worsening. Failed NSAIDs and topamax. Will re-try duloxetine. Refilled flexeril. Referred to Neurosurgery for other options.  Orders:  -     Referral to Neurosurgery; Future  -     DULoxetine (Cymbalta) 30 mg DR capsule; Take once capsule for 7 days then take 2 capsules daily.  -     cyclobenzaprine (Flexeril) 10 mg tablet; Take 1 tablet (10 mg) by mouth 3 times a day as needed for muscle spasms.  Chronic bilateral low back pain " with bilateral sciatica  Left lumbar radiculopathy  Muscle cramp      Follow up/Return to the clinic in 3 months    Attending Supervision: discussed with attending physician, Dr. Kimberlee Henriquez MD  Family Medicine, PGY-3

## 2024-03-20 PROBLEM — N39.41 URGE INCONTINENCE OF URINE: Status: RESOLVED | Noted: 2023-03-19 | Resolved: 2024-03-20

## 2024-03-20 PROBLEM — M48.07 SPINAL STENOSIS OF LUMBOSACRAL REGION: Status: ACTIVE | Noted: 2024-03-20

## 2024-03-20 PROBLEM — M17.12 PATELLOFEMORAL ARTHRITIS OF LEFT KNEE: Status: RESOLVED | Noted: 2023-03-19 | Resolved: 2024-03-20

## 2024-03-20 PROBLEM — G89.29 CHRONIC LEFT-SIDED LOW BACK PAIN WITHOUT SCIATICA: Status: RESOLVED | Noted: 2023-03-19 | Resolved: 2024-03-20

## 2024-03-20 PROBLEM — M25.512 LEFT SHOULDER PAIN: Status: RESOLVED | Noted: 2023-03-19 | Resolved: 2024-03-20

## 2024-03-20 PROBLEM — Z86.2 HISTORY OF ANEMIA: Status: RESOLVED | Noted: 2023-03-19 | Resolved: 2024-03-20

## 2024-03-20 PROBLEM — A59.9 TRICHOMONAS INFECTION: Status: RESOLVED | Noted: 2023-03-19 | Resolved: 2024-03-20

## 2024-03-20 PROBLEM — R05.9 COUGH: Status: RESOLVED | Noted: 2023-03-19 | Resolved: 2024-03-20

## 2024-03-20 PROBLEM — M17.11 PATELLOFEMORAL ARTHRITIS OF RIGHT KNEE: Status: RESOLVED | Noted: 2023-03-19 | Resolved: 2024-03-20

## 2024-03-20 PROBLEM — G89.29 CHRONIC BILATERAL LOW BACK PAIN WITH BILATERAL SCIATICA: Status: ACTIVE | Noted: 2024-03-20

## 2024-03-20 PROBLEM — R39.15 URINARY URGENCY: Status: RESOLVED | Noted: 2023-03-19 | Resolved: 2024-03-20

## 2024-03-20 PROBLEM — M54.42 CHRONIC BILATERAL LOW BACK PAIN WITH BILATERAL SCIATICA: Status: ACTIVE | Noted: 2024-03-20

## 2024-03-20 PROBLEM — R35.1 NOCTURIA: Status: RESOLVED | Noted: 2023-03-19 | Resolved: 2024-03-20

## 2024-03-20 PROBLEM — M54.41 CHRONIC BILATERAL LOW BACK PAIN WITH BILATERAL SCIATICA: Status: ACTIVE | Noted: 2024-03-20

## 2024-03-20 PROBLEM — M54.50 CHRONIC LEFT-SIDED LOW BACK PAIN WITHOUT SCIATICA: Status: RESOLVED | Noted: 2023-03-19 | Resolved: 2024-03-20

## 2024-03-20 PROBLEM — M25.552 HIP PAIN, LEFT: Status: RESOLVED | Noted: 2023-03-19 | Resolved: 2024-03-20

## 2024-03-20 PROBLEM — M54.2 NECK PAIN: Status: RESOLVED | Noted: 2023-03-19 | Resolved: 2024-03-20

## 2024-03-20 PROBLEM — K21.9 GERD (GASTROESOPHAGEAL REFLUX DISEASE): Status: RESOLVED | Noted: 2023-03-19 | Resolved: 2024-03-20

## 2024-03-20 PROBLEM — R35.0 URINARY FREQUENCY: Status: RESOLVED | Noted: 2023-03-19 | Resolved: 2024-03-20

## 2024-03-21 NOTE — PROGRESS NOTES
I reviewed the resident/fellow's documentation and discussed the patient with the resident/fellow. I agree with the resident/fellow's medical decision making as documented in the note.    Johan Mohan MD

## 2024-03-25 ENCOUNTER — APPOINTMENT (OUTPATIENT)
Dept: ORTHOPEDIC SURGERY | Facility: CLINIC | Age: 39
End: 2024-03-25
Payer: COMMERCIAL

## 2024-03-26 ENCOUNTER — APPOINTMENT (OUTPATIENT)
Dept: RADIOLOGY | Facility: CLINIC | Age: 39
End: 2024-03-26
Payer: COMMERCIAL

## 2024-03-27 ENCOUNTER — APPOINTMENT (OUTPATIENT)
Dept: CARDIOLOGY | Facility: HOSPITAL | Age: 39
End: 2024-03-27
Payer: COMMERCIAL

## 2024-04-08 ENCOUNTER — OFFICE VISIT (OUTPATIENT)
Dept: ORTHOPEDIC SURGERY | Facility: CLINIC | Age: 39
End: 2024-04-08
Payer: COMMERCIAL

## 2024-04-08 ENCOUNTER — HOSPITAL ENCOUNTER (OUTPATIENT)
Dept: RADIOLOGY | Facility: CLINIC | Age: 39
Discharge: HOME | End: 2024-04-08
Payer: COMMERCIAL

## 2024-04-08 VITALS — BODY MASS INDEX: 46.32 KG/M2 | HEIGHT: 65 IN | WEIGHT: 278 LBS

## 2024-04-08 DIAGNOSIS — M54.16 LEFT LUMBAR RADICULOPATHY: ICD-10-CM

## 2024-04-08 DIAGNOSIS — M48.07 SPINAL STENOSIS OF LUMBOSACRAL REGION: Primary | ICD-10-CM

## 2024-04-08 PROCEDURE — 72110 X-RAY EXAM L-2 SPINE 4/>VWS: CPT

## 2024-04-08 PROCEDURE — 3008F BODY MASS INDEX DOCD: CPT

## 2024-04-08 PROCEDURE — 99213 OFFICE O/P EST LOW 20 MIN: CPT

## 2024-04-08 PROCEDURE — 72110 X-RAY EXAM L-2 SPINE 4/>VWS: CPT | Performed by: STUDENT IN AN ORGANIZED HEALTH CARE EDUCATION/TRAINING PROGRAM

## 2024-04-08 ASSESSMENT — ENCOUNTER SYMPTOMS
LOSS OF SENSATION IN FEET: 0
OCCASIONAL FEELINGS OF UNSTEADINESS: 0
DEPRESSION: 0

## 2024-04-08 ASSESSMENT — PAIN SCALES - GENERAL: PAINLEVEL_OUTOF10: 10 - WORST POSSIBLE PAIN

## 2024-04-08 ASSESSMENT — PAIN - FUNCTIONAL ASSESSMENT: PAIN_FUNCTIONAL_ASSESSMENT: 0-10

## 2024-04-08 NOTE — PROGRESS NOTES
HPI:  Patient is a pleasant 39-year-old female who presents today with a progressively worsening history of low back pain that radiates down the left leg.  This for started around December 2020 after she fell on the stairs.  Recently, the pain has been traveling to her left buttocks, anterior and posterior thigh, lower leg, and into the foot.  She reports this pain is daily.  She admits to numbness and tingling in a similar distribution.  She states very occasional pain in the right leg that is mainly in the thigh.  She has been in physical therapy on and off for the past few years.  She has tried gabapentin which did not help.  She has also tried meloxicam which did not help.  At this point, she would like to know what her other treatment alternatives are as her pain is becoming intolerable.    ROS:  Reviewed on EMR and patient intake sheet.    PMH/SH:  Reviewed on EMR and patient intake sheet.    Exam:  MSK: Full strength range of motion of lower extremities bilaterally.  Positive straight leg raise on left, negative on right.  General: No acute distress. Awake and conversant.  Eyes: Normal conjunctiva, anicteric. Round symmetric pupils.  ENT: Hearing grossly intact. No nasal discharge.  Neck: Neck is supple. No masses or thyromegaly.  Respiratory: Respirations are non-labored. No wheezing.  Skin: Warm. No rashes or ulcers.  Psych: Alert and oriented. Cooperative, appropriate mood and affect, normal judgement.  CV: No lower extremity edema.  Neuro: Sensation and CN II-XII grossly normal.    Radiology:     X-rays of lumbar spine personally reviewed and demonstrate no acute fractures or dislocations.  Slightly excessive lumbar lordosis.  Disc height well-maintained.  Mild degenerative discs at L4/L5 and L5/S1.    Diagnosis:    Lumbar radiculopathy  Lumbar spondylosis    Assessment and Plan:  Patient was seen today and evaluated for lumbar radicular symptoms that have been progressively worsening over the past few  years.  At this point, we discussed pain management options and epidural injections.  We discussed the benefits and risk of these injections.  Additionally, I ordered an MRI of the lumbar spine for the patient in order to assess the severity of the stenosis of the lumbar spine.  She should follow-up after completion of the MRI in order to have a potential surgical discussion.  Otherwise, the patient should continue ambulation as tolerated and limiting aggravating activities.  She should continue physical therapy.  Patient feels her questions were properly answered at time of visit today.  Patient agrees to the plan above.    This note was dictated using speech recognition software and was not corrected for spelling or grammatical errors    Quang Kennedy PA-C  Department of Orthopaedic Surgery  8:15 AM  04/09/24      [address] [place] [address]    Voicemail: [phone]   Appts: [phone]  Fax: [phone]

## 2024-04-11 ENCOUNTER — DOCUMENTATION (OUTPATIENT)
Dept: SURGERY | Facility: HOSPITAL | Age: 39
End: 2024-04-11
Payer: COMMERCIAL

## 2024-04-11 NOTE — PROGRESS NOTES
I received a message from Daphne to call the pt. I called the pt, no answer and her vcml is full and so no message could be left.

## 2024-04-11 NOTE — PROGRESS NOTES
Pt called back in and we reviewed clearances and new list was sent to the pt via [employer]. Pt to work on clearances.

## 2024-04-24 ENCOUNTER — APPOINTMENT (OUTPATIENT)
Dept: RADIOLOGY | Facility: HOSPITAL | Age: 39
End: 2024-04-24
Payer: COMMERCIAL

## 2024-04-24 ENCOUNTER — OFFICE VISIT (OUTPATIENT)
Dept: PAIN MEDICINE | Facility: HOSPITAL | Age: 39
End: 2024-04-24
Payer: COMMERCIAL

## 2024-04-24 DIAGNOSIS — M48.07 SPINAL STENOSIS OF LUMBOSACRAL REGION: Primary | ICD-10-CM

## 2024-04-24 PROCEDURE — 99213 OFFICE O/P EST LOW 20 MIN: CPT | Performed by: ANESTHESIOLOGY

## 2024-04-24 PROCEDURE — 3008F BODY MASS INDEX DOCD: CPT | Performed by: ANESTHESIOLOGY

## 2024-04-24 ASSESSMENT — PAIN SCALES - GENERAL: PAINLEVEL_OUTOF10: 6

## 2024-04-24 ASSESSMENT — PAIN - FUNCTIONAL ASSESSMENT: PAIN_FUNCTIONAL_ASSESSMENT: 0-10

## 2024-04-24 NOTE — PROGRESS NOTES
Pain Management Clinic Note     Chief Complaint: low back pain   Referred by: Quang Kennedy PA-C     History Of Present Illness  Анна Bird is a 39 y.o. female with a past medical history of low back pain who presents to clinic for follow up of her low back pain. Patient last saw Dr. Mccullough in June of 2023 with primary diagnosis of annular disc tear.  At that time patient was trialed on topiramate, and Cymbalta.  She states that both of those medications has not touched her pain at all, and she has subsequently stopped them.  She says that the pain has continued to be constant, rating it a 10/10 in severity.  It is located in her lower back mainly on the left side, however it is starting to spread to the midline towards the right side. The pain is sharp and stabbing in nature.  She also has radiation of the pain through her left buttock down her posterior thigh all the way to her foot.  She also endorses numbness and tingling of the left foot.  She has started to note some pain starting in her right back that goes into her right thigh.  Pain is worst with walking, she struggles to walk even short distances without having to to either take breaks or leaning forward on objects for relief.  She can barely tolerate standing for even short periods of time without having to shift her weight, sit down for relief, or leaning forward on objects.  She is not interested in trying any new medications or restarting any previous medications at this time, she is interested in steroid injections in the back at this time.    Most recent imaging is MRI lumbar spine on 03/2022 that showed mild left foraminal narrowing at L5-S1     The pain causes significant stress in the patient's life, specifically interferes with general activity, mood, walking ability, ability to perform tasks at home and/or work.  Patient participates in physical therapy and continues to perform physician directed exercises at home. Denies any bowel or  bladder incontinence, saddle anesthesia, worsening pain, weakness or falls.     Past Medical History  She has a past medical history of Abnormal chromosomal and genetic finding on  screening of mother (2016), Chondrocostal junction syndrome (tietze) (2015), Encounter for  screening for Streptococcus B ([facility]), Encounter for routine checking of intrauterine contraceptive device (2016), Encounter for routine postpartum follow-up ([facility]) (2017), Encounter for screening for diseases of the blood and blood-forming organs and certain disorders involving the immune mechanism (2015), Encounter for supervision of other normal pregnancy, unspecified trimester ([facility]) (2020), Nocturia (2016), Obesity complicating pregnancy, unspecified trimester ([facility]) (2017), Other specified abnormal immunological findings in serum (2015), Other specified personal risk factors, not elsewhere classified, Personal history of other diseases of the musculoskeletal system and connective tissue (2015), Personal history of other endocrine, nutritional and metabolic disease (10/29/2013), Personal history of other specified conditions (2014), Personal history of other specified conditions (10/24/2013), Personal history of other specified conditions (2016), Personal history of other specified conditions (2018), Retention of urine, unspecified (2020), Supervision of pregnancy with history of pre-term labor, unspecified trimester ([facility]) (2017), Unspecified pre-existing hypertension complicating pregnancy, unspecified trimester ([facility]) (2017), and Urge incontinence.    Surgical History  She has a past surgical history that includes Cholecystectomy (2013).     Social History  She reports that she has never smoked. She has never used smokeless tobacco. She reports current alcohol use of about 2.0 standard drinks of alcohol  per week. She reports that she does not use drugs.    Family History  Family History   Problem Relation Name Age of Onset    Hypertension Mother Grandmother     Cancer Mother Grandmother     Diabetes Mother Grandmother     Hypertension Father Regulo     Arthritis Father Regulo     Cancer Paternal Grandmother Gabby         Allergies  Iodinated contrast media    Review of Symptoms:   Constitutional: Negative for chills, diaphoresis or fever  HENT: Negative for neck swelling  Eyes:.  Negative for eye pain  Respiratory:.  Negative for cough, shortness of breath or wheezing    Cardiovascular:.  Negative for chest pain or palpitations  Gastrointestinal:.  Negative for abdominal pain, nausea and vomiting  Genitourinary:.  Negative for urgency  Musculoskeletal:  Positive for back pain. Denies falls within the past 3 months.  Skin: Negative for wounds or itching   Neurological: Positive for numbness of left lower extremity. Negative for dizziness, seizures, loss of consciousness and weakness  Endo/Heme/Allergies: Does not bruise/bleed easily  Psychiatric/Behavioral: Negative for depression. The patient does not appear anxious.       PHYSICAL EXAM  Vitals signs reviewed  Constitutional:       General: Not in acute distress     Appearance: Normal appearance. Not ill-appearing.  HENT:     Head: Normocephalic and atraumatic  Eyes:     Conjunctiva/sclera: Conjunctivae normal  Cardiovascular:     Rate and Rhythm: Normal rate and regular rhythm  Pulmonary:     Effort: No respiratory distress  Abdominal:     Palpations: Abdomen is soft  Musculoskeletal: TARIQ  Skin:     General: Skin is warm and dry  Neurological:     General: No focal deficit present  Psychiatric:         Mood and Affect: Mood normal         Behavior: Behavior normal    Advanced Exam   Inspection: No gross deformities, no surgical scars  Palpation: Tenderness to palpation of lumbar midline, bilateral lumbar paraspinals, bilateral SI joints  ROM: Normal range of  motion of the lumbar flexion extension  Motor: 5/5 strength upper and lower extremities  Sensory: Negative for sensory abnormalities in upper and lower extremities  Reflexes: 2+ reflexes bilateral upper and lower extremities  Lumbar: Positive straight leg raise on left. negative for facet loading  Sacral: Positive Britney bilaterally  Hip: Negative for pain with anterior, lateral, posterior palpation of hip joints, negative FADIR, negative for internal/external rotation of the hip, negative logroll     Last Recorded Vitals  There were no vitals taken for this visit.    Relevant Results  Current Outpatient Medications   Medication Instructions    chlorthalidone (Hygroton) 25 mg tablet 1 tablet, oral, Daily    cyclobenzaprine (FLEXERIL) 10 mg, oral, 3 times daily PRN    diclofenac sodium 1 % kit Topical, Daily, Apply sparingly to affected area(s)    DULoxetine (Cymbalta) 30 mg DR capsule Take once capsule for 7 days then take 2 capsules daily.    ferrous sulfate 325 (65 Fe) MG tablet 1 tablet, oral, Every other day, Without food to increase absorption    omeprazole (PriLOSEC) 20 mg DR capsule 1 capsule, oral, Daily with breakfast         MR LUMBAR SPINE WO IV CONTRAST 03/21/2022    Narrative  MRN: [id number]  Patient Name: DAVIDE KRAMER    STUDY:  MRI of the lumbar spine without contrast.    INDICATION:  Left lumbar radiculopathy  M54.16: Left lumbar radiculopathy.    COMPARISON:  CT lumbar spine 01/25/2022    ACCESSION NUMBER(S):  [id number]    ORDERING CLINICIAN:  SANDRA CUI    TECHNIQUE:  T2 sagittal, T1 sagittal, stir sagittal, T1 axial, T2 axial images of  the lumbosacral spine were obtained.    FINDINGS:  This report assumes 5 non rib-bearing lumbar vertebral bodies. The  lowest lumbar intervertebral disc will be labeled L5-S1.    There is normal alignment of the lumbar vertebra. The vertebral body  heights are maintained. There is mild edema within left-sided L5 and  S1 pedicles as well as left L5-S1 facet  joint.    There is multilevel disc desiccation. The conus medullaris terminates  at  the L1/L2 level. The visualized spinal cord is normal in caliber  and signal intensity.    T12-L1: There is no spinal canal or neural foraminal stenosis.    L1-L2: There is no spinal canal or neural foraminal stenosis.    L2-L3: There is no spinal canal or neural foraminal stenosis.    L3-L4:  There is bilateral facet thickening without spinal canal or  neural foraminal stenosis.    L4-L5: There is bilateral facet thickening without spinal canal or  neural foraminal stenosis.    L5-S1: There is bilateral facet hypertrophy, left greater than right.  There is small left facet joint effusion. There is mild left neural  foraminal narrowing.. There is no spinal canal stenosis.    No prevertebral or posterior paraspinal soft tissue signal  abnormalities.    Impression  Mild left neural foraminal narrowing at the L5-S1 level. Mild edema  of the left-sided pedicles of L5 and S1 as well as left L5-S1 facet.      I personally reviewed the images/study and I agree with the findings  as stated. This study was interpreted at [facility], [place], [place].     No image results found.       1. Spinal stenosis of lumbosacral region  Referral to Pain Management    Chronic, worsening. Failed NSAIDs and topamax. Will re-try duloxetine. Refilled flexeril. Referred to Neurosurgery for other options.           ASSESSMENT/PLAN  Анна Bird is a 39 y.o. female with a past medical history of Low back pain and obesity presents for follow-up regarding her chronic low back pain with radicular symptoms in the left lower extremity.  The patient's pain started following a work-related injury.  Based on history, available imaging and physical exam, the patient's primary pain etiology is likely due to lumbar foraminal stenosis with left lumbosacral radicular pain. Patient has mild foraminal narrowing of the left L5 and has  clear radicular symptoms with significant pain. Patient has failed conservative management with medications and physical therapy. We discussed doing an L5 transforaminal epidural steroid injection on the left side for pain relief. Patient is agreeable to this plan.       Our plan is as follows:  - Left L5 Transforaminal epidural steroid injection  - Continue physician directed home exercises       Ralph Hansen MD

## 2024-04-25 ENCOUNTER — DOCUMENTATION (OUTPATIENT)
Dept: SURGERY | Facility: HOSPITAL | Age: 39
End: 2024-04-25
Payer: COMMERCIAL

## 2024-05-08 ENCOUNTER — APPOINTMENT (OUTPATIENT)
Dept: RADIOLOGY | Facility: HOSPITAL | Age: 39
End: 2024-05-08
Payer: COMMERCIAL

## 2024-05-14 ENCOUNTER — APPOINTMENT (OUTPATIENT)
Dept: GASTROENTEROLOGY | Facility: HOSPITAL | Age: 39
End: 2024-05-14
Payer: COMMERCIAL

## 2024-05-14 ENCOUNTER — HOSPITAL ENCOUNTER (OUTPATIENT)
Dept: RADIOLOGY | Facility: HOSPITAL | Age: 39
Discharge: HOME | End: 2024-05-14
Payer: COMMERCIAL

## 2024-05-14 VITALS
RESPIRATION RATE: 16 BRPM | HEART RATE: 76 BPM | DIASTOLIC BLOOD PRESSURE: 85 MMHG | TEMPERATURE: 98 F | SYSTOLIC BLOOD PRESSURE: 137 MMHG | OXYGEN SATURATION: 99 %

## 2024-05-14 DIAGNOSIS — M48.07 SPINAL STENOSIS OF LUMBOSACRAL REGION: ICD-10-CM

## 2024-05-14 PROCEDURE — 64483 NJX AA&/STRD TFRM EPI L/S 1: CPT | Mod: LT | Performed by: ANESTHESIOLOGY

## 2024-05-14 PROCEDURE — 2720000007 HC OR 272 NO HCPCS

## 2024-05-14 PROCEDURE — 64483 NJX AA&/STRD TFRM EPI L/S 1: CPT | Performed by: ANESTHESIOLOGY

## 2024-05-14 ASSESSMENT — PAIN - FUNCTIONAL ASSESSMENT
PAIN_FUNCTIONAL_ASSESSMENT: 0-10
PAIN_FUNCTIONAL_ASSESSMENT: 0-10

## 2024-05-14 ASSESSMENT — PAIN SCALES - GENERAL
PAINLEVEL_OUTOF10: 6
PAINLEVEL_OUTOF10: 5 - MODERATE PAIN
PAINLEVEL_OUTOF10: 8
PAINLEVEL_OUTOF10: 0 - NO PAIN

## 2024-05-14 NOTE — PROCEDURES
Pre-Op Diagnosis: Lumbar radicular pain  Post-Procedure Diagnosis: Same as preop diagnosis  Procedure: Left L5 transforaminal epidural steroid injection using fluoroscopic guidance  Surgeon: Nasrin Mccullough MD, PhD   Resident/Fellow/Other Assistant: Quang Arauz MD     Procedure Note:     Ms. Анна Bird is a 39 y.o. female with left lower extremity radicular pain presents today for left L5 transforaminal epidural steroid injection.  The patient has mild left-sided neuroforaminal stenosis at L5/S1 with left pedicle edema and degenerated L5/S1 disc.    The patient was identified in the preoperative area and informed consent was obtained.  The patient was then brought to the procedure room and placed in the prone position.  Using fluoroscopic guidance, the skin and subcutaneous tissue overlying needle trajectory to the L5/S1 foramen on the left side was anesthetized with a total of 2 cc of Lidocaine.   A 22-gauge pencil point needle was then advanced under fluoroscopic guidance into the foramen where the L5 nerve root exits on the left side.  Needle position was confirmed in AP and lateral views.  Injection of iohexol contrast under live fluoroscopy revealed appropriate epidural spread without vascular uptake.  Thereafter, 0.5 cc of 0.5% lidocaine with 5 mg dexamethasone was injected into the needle tip and the needle was removed.  The patient was then transferred to the recovery room in stable condition and will update us on outpatient basis on the response to the procedure.

## 2024-05-14 NOTE — H&P
HISTORY AND PHYSICAL    History Of Present Illness  Анна Bird is a 39 y.o. female presenting with chronic pain.  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    Chondrocostal junction syndrome (tietze) 2015    Costochondritis    Encounter for  screening for Streptococcus B ([facility])      screening for streptococcus B    Encounter for routine checking of intrauterine contraceptive device 2016    Intrauterine device surveillance    Encounter for routine postpartum follow-up ([facility]) 2017    Normal postpartum course    Encounter for screening for diseases of the blood and blood-forming organs and certain disorders involving the immune mechanism 2015    Screening for deficiency anemia    Encounter for supervision of other normal pregnancy, unspecified trimester ([facility]) 2020    Prenatal care, subsequent pregnancy    Nocturia 2016    Nocturia    Obesity complicating pregnancy, unspecified trimester ([facility]) 2017    Obesity in pregnancy    Other specified abnormal immunological findings in serum 2015    Positive Helicobacter pylori serology    Other specified personal risk factors, not elsewhere classified     At risk of UTI    Personal history of other diseases of the musculoskeletal system and connective tissue 2015    History of chronic back pain    Personal history of other endocrine, nutritional and metabolic disease 10/29/2013    History of vitamin D deficiency    Personal history of other specified conditions 2014    History of insomnia    Personal  history of other specified conditions 10/24/2013    History of urinary frequency    Personal history of other specified conditions 2016    History of dysuria    Personal history of other specified conditions 2018    History of dysuria    Retention of urine, unspecified 2020    Incomplete emptying of bladder    Supervision of pregnancy with history of pre-term labor, unspecified trimester ([facility]) 2017    Previous  delivery, antepartum    Unspecified pre-existing hypertension complicating pregnancy, unspecified trimester ([facility]) 2017    Chronic hypertension in pregnancy    Urge incontinence     Sensory urge incontinence       Surgical History  Past Surgical History:   Procedure Laterality Date    CHOLECYSTECTOMY  2013    Cholecystectomy Laparoscopic        Social History  She reports that she has never smoked. She has never used smokeless tobacco. She reports current alcohol use of about 2.0 standard drinks of alcohol per week. She reports that she does not use drugs.    Family History  Family History   Problem Relation Name Age of Onset    Hypertension Mother Grandmother     Cancer Mother Grandmother     Diabetes Mother Grandmother     Hypertension Father Regulo     Arthritis Father Regulo     Cancer Paternal Grandmother Gabby         Allergies  Iodinated contrast media    Review of Systems   12 point ROS done and negative except for the above.   Physical Exam     General: NAD, well groomed, well nourished  Eyes: Non-icteric sclera, EOMI  Ears, Nose, Mouth, and Throat: External ears and nose appear to be without deformity or rash. No lesions or masses noted. Hearing is grossly intact.   Neck: Trachea midline  Respiratory: Nonlabored breathing   Cardiovascular: No peripheral edema   Skin: No rashes or open lesions/ulcers identified on skin.    Last Recorded Vitals  Blood pressure 153/89, pulse 91, temperature 36.7 °C (98.1 °F), temperature source Temporal, resp. rate 16,  last menstrual period 05/11/2024, SpO2 98%.    Relevant Results           Assessment/Plan       Risks, benefits, alternatives discussed. All questions answered to the best of my ability. Patient agrees to proceed.   -We will proceed with planned procedure        Justin Arauz MD  Chronic Pain Fellow  [facility]

## 2024-05-17 NOTE — PROGRESS NOTES
Bariatric Surgery Program - [facility]    PREOPERATIVE, MULTIDISCIPLINARY, MEDICALLY SUPERVISED, REDUCED CALORIE DIET, BEHAVIOR MODIFICATION AND EXERCISE PROGRAM      S:  4/6 MSWL Start weight 268#. No weight today due to scale currently broken.   Reports eating more fruits/vegetables, thinks her portion sizes are better. Reports eating more protein foods at meals and making better snack choices. Fluid intake is water mainly, >60oz, denies drinking pop and coffee. Practicing 30' rule. Taking MVI daily. Patient reports no emotional eating episodes. Pt is doing well, made aware she needs to provide updated weight to ensure she is losing weight before surgery.     B: green smoothie, HB egg and turkey sausage  L : chicken and salad  D: fish, vegetables, salad  Snacks: popcorn, peanuts, or fruit    Exercise: 15 stairs up/down x3, 3 times/day     O: Wt: n/a lb   Ht:  65 in   BMI: n/a    Goal: 5% body weight loss over the course of program    Dietary recommendation:   1. Continue to drink 64oz water daily.  2. Practice the 30-30-30 rule by drinking between meals.  3. Continue to eat 3 meals and 1 snack daily.  4. Keep snacks less than 200 calories, and aim for 5-15g protein and >3 grams fiber.  5. Continue to build on to your exercise routine. Working towards a goal of 250 minutes/week.         A/P: Pt appears to be making good progress and has applied recommendations provided from IV in November of 2023. Patient needs to provide weight in order to receive nutrition clearance. Patient will continue to work on practicing postop behaviors and work on adding more exercise. Patient will attend MSWL class in June and RD follow up for clearance in July.

## 2024-05-20 PROBLEM — U09.9 POST-ACUTE COVID-19 SYNDROME: Status: ACTIVE | Noted: 2023-03-19

## 2024-05-20 PROBLEM — K63.9 COLON DISORDER: Status: ACTIVE | Noted: 2024-05-20

## 2024-05-20 PROBLEM — S19.9XXA INJURY OF NECK: Status: ACTIVE | Noted: 2024-05-20

## 2024-05-20 PROBLEM — E66.01 MORBID OBESITY (MULTI): Status: ACTIVE | Noted: 2024-05-20

## 2024-05-21 ENCOUNTER — NUTRITION (OUTPATIENT)
Dept: SURGERY | Facility: HOSPITAL | Age: 39
End: 2024-05-21
Payer: COMMERCIAL

## 2024-05-21 DIAGNOSIS — E66.01 OBESITY, CLASS III, BMI 40-49.9 (MORBID OBESITY) (MULTI): ICD-10-CM

## 2024-05-21 DIAGNOSIS — Z98.84 BARIATRIC SURGERY STATUS: Primary | ICD-10-CM

## 2024-05-22 ENCOUNTER — OFFICE VISIT (OUTPATIENT)
Dept: CARDIOLOGY | Facility: HOSPITAL | Age: 39
End: 2024-05-22
Payer: COMMERCIAL

## 2024-05-22 VITALS
BODY MASS INDEX: 42.92 KG/M2 | HEART RATE: 55 BPM | DIASTOLIC BLOOD PRESSURE: 84 MMHG | HEIGHT: 65 IN | WEIGHT: 257.6 LBS | OXYGEN SATURATION: 98 % | SYSTOLIC BLOOD PRESSURE: 157 MMHG

## 2024-05-22 DIAGNOSIS — Z01.818 PREOPERATIVE CLEARANCE: Primary | ICD-10-CM

## 2024-05-22 DIAGNOSIS — R94.31 ABNORMAL ELECTROCARDIOGRAM (ECG) (EKG): ICD-10-CM

## 2024-05-22 DIAGNOSIS — I10 ESSENTIAL HYPERTENSION: ICD-10-CM

## 2024-05-22 PROCEDURE — 99214 OFFICE O/P EST MOD 30 MIN: CPT | Performed by: STUDENT IN AN ORGANIZED HEALTH CARE EDUCATION/TRAINING PROGRAM

## 2024-05-22 PROCEDURE — 93010 ELECTROCARDIOGRAM REPORT: CPT | Performed by: INTERNAL MEDICINE

## 2024-05-22 PROCEDURE — 99204 OFFICE O/P NEW MOD 45 MIN: CPT | Performed by: STUDENT IN AN ORGANIZED HEALTH CARE EDUCATION/TRAINING PROGRAM

## 2024-05-22 PROCEDURE — 1036F TOBACCO NON-USER: CPT | Performed by: STUDENT IN AN ORGANIZED HEALTH CARE EDUCATION/TRAINING PROGRAM

## 2024-05-22 PROCEDURE — 3079F DIAST BP 80-89 MM HG: CPT | Performed by: STUDENT IN AN ORGANIZED HEALTH CARE EDUCATION/TRAINING PROGRAM

## 2024-05-22 PROCEDURE — 3077F SYST BP >= 140 MM HG: CPT | Performed by: STUDENT IN AN ORGANIZED HEALTH CARE EDUCATION/TRAINING PROGRAM

## 2024-05-22 PROCEDURE — 3008F BODY MASS INDEX DOCD: CPT | Performed by: STUDENT IN AN ORGANIZED HEALTH CARE EDUCATION/TRAINING PROGRAM

## 2024-05-22 PROCEDURE — 93005 ELECTROCARDIOGRAM TRACING: CPT | Performed by: STUDENT IN AN ORGANIZED HEALTH CARE EDUCATION/TRAINING PROGRAM

## 2024-05-22 RX ORDER — AMLODIPINE BESYLATE 5 MG/1
5 TABLET ORAL DAILY
Qty: 90 TABLET | Refills: 3 | Status: SHIPPED | OUTPATIENT
Start: 2024-05-22 | End: 2025-05-22

## 2024-05-22 ASSESSMENT — COLUMBIA-SUICIDE SEVERITY RATING SCALE - C-SSRS
1. IN THE PAST MONTH, HAVE YOU WISHED YOU WERE DEAD OR WISHED YOU COULD GO TO SLEEP AND NOT WAKE UP?: NO
2. HAVE YOU ACTUALLY HAD ANY THOUGHTS OF KILLING YOURSELF?: NO
6. HAVE YOU EVER DONE ANYTHING, STARTED TO DO ANYTHING, OR PREPARED TO DO ANYTHING TO END YOUR LIFE?: NO

## 2024-05-22 ASSESSMENT — PAIN SCALES - GENERAL: PAINLEVEL: 0-NO PAIN

## 2024-05-22 NOTE — PATIENT INSTRUCTIONS
Dear Анна Bird,    It was a pleasure meeting you today at the Cardiology office. As we dicussed, your clinical condition is preop clearance, hypertension and nonspecific EKG abnormalities. I recommended a transthoracic echocardiogram and adding amlodipine to your treatment. I will contact you once your results are available to give you my impressions through [employer].    Sincerely,     Pedro Pablo Barron MD

## 2024-05-22 NOTE — PROGRESS NOTES
Location of visit: [facility]   Type of Visit: New    Chief Complaint:  Patient was referred to Cardiology for preop clearance.    History Of Present Illness:    Анна Bird is a 39 y.o. female, with history significant for HTN, IRVIN, GERD, and BMI 42.8, who visits Cardiology today as a new patient  for preop clearance.    Patient denies chest pain, dyspnea on exertion, shortness of breath, orthopnea, PND, nocturia, edema, palpitations, dizziness, lightheadedness, syncope, claudication, or snoring/apnea. Good exercise capacity, climbing 3 flights of stairs without stopping.    Blood pressure today: 157/87 mmHg, she states that it is usually elevated despite taking her medication    Last potassium: 3.1    Today's ECG shows sinus rhythm at 74 bpm, normal AV conduction, and biphasic T waves in V4-V5.    Past Medical History:  She has a past medical history of Abnormal chromosomal and genetic finding on  screening of mother (2016), Chondrocostal junction syndrome (tietze) (2015), Encounter for  screening for Streptococcus B ([facility]), Encounter for routine checking of intrauterine contraceptive device (2016), Encounter for routine postpartum follow-up ([facility]) (2017), Encounter for screening for diseases of the blood and blood-forming organs and certain disorders involving the immune mechanism (2015), Encounter for supervision of other normal pregnancy, unspecified trimester ([facility]) (2020), Nocturia (2016), Obesity complicating pregnancy, unspecified trimester ([facility]) (2017), Other specified abnormal immunological findings in serum (2015), Other specified personal risk factors, not elsewhere classified, Personal history of other diseases of the musculoskeletal system and connective tissue (2015), Personal history of other endocrine, nutritional and metabolic disease (10/29/2013), Personal history of other specified conditions  "(05/19/2014), Personal history of other specified conditions (10/24/2013), Personal history of other specified conditions (11/04/2016), Personal history of other specified conditions (04/12/2018), Retention of urine, unspecified (01/17/2020), Supervision of pregnancy with history of pre-term labor, unspecified trimester ([facility]) (01/31/2017), Unspecified pre-existing hypertension complicating pregnancy, unspecified trimester ([facility]) (02/14/2017), and Urge incontinence.    Past Surgical History:  She has a past surgical history that includes Cholecystectomy (08/14/2013).    Social History:  She reports that she has never smoked. She has never used smokeless tobacco. She reports current alcohol use of about 2.0 standard drinks of alcohol per week. She reports that she does not use drugs.    Family History:  Family History   Problem Relation Name Age of Onset    Hypertension Mother Grandmother     Cancer Mother Grandmother     Diabetes Mother Grandmother     Hypertension Father Regulo     Arthritis Father Regulo     Cancer Paternal Grandmother Gabby      Allergies:  Iodinated contrast media    Outpatient Medications:  Current Outpatient Medications   Medication Instructions    chlorthalidone (Hygroton) 25 mg tablet 1 tablet, oral, Daily    cyclobenzaprine (FLEXERIL) 10 mg, oral, 3 times daily PRN    diclofenac sodium 1 % kit Topical, Daily, Apply sparingly to affected area(s)    DULoxetine (Cymbalta) 30 mg DR capsule Take once capsule for 7 days then take 2 capsules daily.    ferrous sulfate 325 (65 Fe) MG tablet 1 tablet, oral, Every other day, Without food to increase absorption    omeprazole (PriLOSEC) 20 mg DR capsule 1 capsule, oral, Daily with breakfast     Last Recorded Vitals:  Vitals:    05/22/24 0837   BP: 157/84   BP Location: Left arm   Patient Position: Sitting   BP Cuff Size: Adult   Pulse: 55   SpO2: 98%   Weight: 117 kg (257 lb 9.6 oz)   Height: 1.651 m (5' 5\")     Physical Exam:      5/22/2024     " "8:37 AM 5/14/2024    10:45 AM 5/14/2024    10:41 AM 5/14/2024    10:36 AM 5/14/2024    10:16 AM 4/8/2024     1:25 PM   Vitals   Systolic 157 137 122 122 153    Diastolic 84 85 59 57 89    Heart Rate 55 76 78 90 91    Temp  36.7 °C (98 °F)   36.7 °C (98.1 °F)    Resp  16 16 16 16    Height (in) 1.651 m (5' 5\")     1.651 m (5' 5\")   Weight (lb) 257.6     278   BMI 42.87 kg/m2     46.26 kg/m2   BSA (m2) 2.32 m2     2.4 m2   Visit Report Report     Report     Wt Readings from Last 5 Encounters:   05/22/24 117 kg (257 lb 9.6 oz)   04/08/24 126 kg (278 lb)   03/15/24 126 kg (278 lb)   11/20/23 119 kg (262 lb)   11/14/23 122 kg (268 lb)     General: Sitting up comfortably in chair; in no apparent distress.  HEENT: Normocephalic; atraumatic. Well hydrated.  Eyes: Anicteric sclera. Extraocular movement intact.  Neck: Supple; no thyromegaly; normal jugular venous pressure, no bruits.  Respiratory: Bilateral air entry equal. No wheezing.  Cardiovascular: Normal S1, S2; no murmurs auscultated.  Abdomen: Nondistended; nontender. (+) bowel sounds.  Extremities: No peripheral edema present. Pulses 2+ diffusely.  Neurological: Oriented to time, place, and person; nonfocal.  Psychiatric: Normal affect.     Last Labs Reviewed:  CBC -  Recent Labs     11/02/22 1937 07/11/22  1041 12/07/21  1607 08/24/20  1208 04/20/18  1147   WBC 11.2 10.6 6.2 10.7 12.1*   HGB 10.9* 10.0* 9.6* 10.5* 14.0   HCT 35.8* 34.2* 33.9* 35.0* 43.5   * 477* 391 473* 493*   MCV 77* 77* 76* 79* 78*     CMP -  Recent Labs     11/02/22 1937 12/07/21 1607 09/07/21 1752 01/14/20  1528 04/20/18  1147    135* 140 139 135*   K 3.1* 3.8 3.7 4.1 4.2   CL 99 103 103 106 99   CO2 30 26 28 26 24   ANIONGAP 11 10 13 11 16   BUN 10 10 14 11 14   CREATININE 0.70 0.89 0.75 0.88 0.69   CALCIUM 9.3 9.0 9.0 9.1 10.0     Recent Labs     11/02/22 1937 12/07/21 1607 09/07/21  1752 04/20/18  1147   ALBUMIN 4.2 3.9 3.8 4.6   ALKPHOS 68 75 75 90   ALT 10 9 7 11   AST " 12 12 9 12   BILITOT 0.4 0.2 0.2 0.4   LIPASE  --   --   --  16     LIPID PANEL -   Recent Labs     09/07/21  1752   CHOL 184   HDL 41.2     HEME/ENDO -  Recent Labs     07/11/22  1041 09/07/21  1752 01/14/20  1528   FERRITIN 16  --   --    IRONSAT 6*  --   --    TSH  --  2.32  --    HGBA1C  --  5.6 5.2     Last Cardiology/Imaging Tests Personally Reviewed (if images available) and Interpreted:  ECG:  ECG shows sinus rhythm at 74 bpm, normal AV conduction, and biphasic T waves in V4-V5.    Echocardiogram:  No results found.    Cath:  No results found.    Stress Test:  No results found.    Cardiac CT/MRI:  No results found.    Other CT:  CT CHEST ABDOMEN PELVIS WO CONTRAST; 11/2/2022   - No calcifications in coronary arteries, aorta or pelvic vessels    CV RISK FACTORS:   # Hypertension: Last BP: 157/84.  # Hyperlipidemia: Last Tchol No results found for requested labs within last 365 days. / LDL No results found for requested labs within last 365 days. / HDL No results found for requested labs within last 365 days. / TRIG No results found for requested labs within last 365 days. (No results in last year.).  # Type II Diabetes Mellitus: Last A1c No results found for requested labs within last 365 days. (No results in last year.).  # Obesity: Last BMI: 42.87.  # CKD: Last BUN/Cr (GFR): No results found for requested labs within last 365 days./No results found for requested labs within last 365 days. (No results found for requested labs within last 365 days.), No results in last year..    ASCV RISK:  The 10-year ASCVD risk score (Judi DK, et al., 2019) is: 3.3%    Values used to calculate the score:      Age: 39 years      Sex: Female      Is Non- : Yes      Diabetic: No      Tobacco smoker: No      Systolic Blood Pressure: 157 mmHg      Is BP treated: No      HDL Cholesterol: 41.2 mg/dL      Total Cholesterol: 184 mg/dL    Assessment/Plan   39 y.o. female, with history significant for HTN,  IRVIN, GERD, and BMI 42.8, who visits Cardiology today as a new patient  for preop clearance. Her hypertension is not well controlled on diuretics as a single medication and her EKG is abnormal, with biphasic T waves in the anterior leads.    #Preop clearance for bariatric surgery  #Abnormal EKG due to biphasic T waves in anterior leads  #HTN    Recommendations:  - Transthoracic echocardiogram   - Add amlodipine 5 mg every day  to HTN treatment  - I will contact the patient once the results are available through [facility]  - I spent 45 minutes assessing the case between pre-charting, face-to-face patient interaction, and documentation    Pedro Pablo Barron MD

## 2024-05-23 ENCOUNTER — TELEPHONE (OUTPATIENT)
Dept: SURGERY | Facility: CLINIC | Age: 39
End: 2024-05-23
Payer: COMMERCIAL

## 2024-05-23 DIAGNOSIS — I10 ESSENTIAL HYPERTENSION: Primary | ICD-10-CM

## 2024-05-23 RX ORDER — CHLORTHALIDONE 25 MG/1
25 TABLET ORAL DAILY
Qty: 90 TABLET | Refills: 3 | Status: SHIPPED | OUTPATIENT
Start: 2024-05-23 | End: 2025-05-23

## 2024-06-06 ENCOUNTER — OFFICE VISIT (OUTPATIENT)
Dept: BEHAVIORAL HEALTH | Facility: CLINIC | Age: 39
End: 2024-06-06
Payer: COMMERCIAL

## 2024-06-06 DIAGNOSIS — E66.01 MORBID OBESITY (MULTI): ICD-10-CM

## 2024-06-06 DIAGNOSIS — F54 PSYCHOLOGICAL FACTOR AFFECTING PHYSICAL CONDITION: ICD-10-CM

## 2024-06-06 PROCEDURE — 3008F BODY MASS INDEX DOCD: CPT | Performed by: PSYCHOLOGIST

## 2024-06-06 PROCEDURE — 90832 PSYTX W PT 30 MINUTES: CPT | Performed by: PSYCHOLOGIST

## 2024-06-06 ASSESSMENT — ANXIETY QUESTIONNAIRES
5. BEING SO RESTLESS THAT IT IS HARD TO SIT STILL: NOT AT ALL
7. FEELING AFRAID AS IF SOMETHING AWFUL MIGHT HAPPEN: NOT AT ALL
6. BECOMING EASILY ANNOYED OR IRRITABLE: NOT AT ALL
4. TROUBLE RELAXING: NOT AT ALL
3. WORRYING TOO MUCH ABOUT DIFFERENT THINGS: NOT AT ALL
GAD7 TOTAL SCORE: 0
1. FEELING NERVOUS, ANXIOUS, OR ON EDGE: NOT AT ALL
2. NOT BEING ABLE TO STOP OR CONTROL WORRYING: NOT AT ALL

## 2024-06-06 ASSESSMENT — PATIENT HEALTH QUESTIONNAIRE - PHQ9
SUM OF ALL RESPONSES TO PHQ QUESTIONS 1-9: 0
1. LITTLE INTEREST OR PLEASURE IN DOING THINGS: NOT AT ALL
7. TROUBLE CONCENTRATING ON THINGS, SUCH AS READING THE NEWSPAPER OR WATCHING TELEVISION: NOT AT ALL
9. THOUGHTS THAT YOU WOULD BE BETTER OFF DEAD, OR OF HURTING YOURSELF: NOT AT ALL
5. POOR APPETITE OR OVEREATING: NOT AT ALL
SUM OF ALL RESPONSES TO PHQ9 QUESTIONS 1 & 2: 0
2. FEELING DOWN, DEPRESSED OR HOPELESS: NOT AT ALL
6. FEELING BAD ABOUT YOURSELF - OR THAT YOU ARE A FAILURE OR HAVE LET YOURSELF OR YOUR FAMILY DOWN: NOT AT ALL
8. MOVING OR SPEAKING SO SLOWLY THAT OTHER PEOPLE COULD HAVE NOTICED. OR THE OPPOSITE, BEING SO FIGETY OR RESTLESS THAT YOU HAVE BEEN MOVING AROUND A LOT MORE THAN USUAL: NOT AT ALL
3. TROUBLE FALLING OR STAYING ASLEEP: NOT AT ALL
4. FEELING TIRED OR HAVING LITTLE ENERGY: NOT AT ALL

## 2024-06-06 NOTE — PROGRESS NOTES
"Start time: 8:00 am  End time: 8:26 am    Follow up visit:  MS. KRAMER and I are meeting as a follow-up to her initial visit in January. At that time, she stated that she was struggling with depression and anxiety as well as emotional eating.     She stated that she has lost 21 lbs overall. She stated that her current weight is 251 lbs. In April she noted her weight to be 280 lbs. She discussed increasing her exercise and continuing to change her eating habits. She reported that she cut back sweets and snacking. She stated that she has reduced her emotional eating through walking and reading more. She is now practicing 30-30-30 and still not drinking any pop. She reported that she is continuing to increase her fruits and vegetables. She reported that she is eating three meals per day and is not engaging in any restrictive behaviors.     She stated that in January she was experiencing high stress due to home environment and relationship difficulties. She reported that her mood improved once she was no longer in that relationship. She left this relationship around February of 2024. She described her currently mood as \"happier\" and noted that she is a \"little more outgoing than [she] used to be.\" Additionally, she noted that her anxiety has decreased following the ending of this relationship. She stated that she was in this relationship for about 10 years and was thinking leaving for about a year and a half before she made the decision. She described feeling \"at peace\" now.     She reported that things have also stabilized financially. She denied currently significant stressors.     PHQ-9 score: 0  RAHUL-7 score: 0    IMPRESSION:     MS. KRAMER is able to provide informed consent and is an appropriate candidate for bariatric surgery from the psychological perspective.     Behavioral confirmation of her candidacy will come in the form of her ability to adhere to her current dietary plan. Should she fare poorly with " this adherence trial, please consider recommending a follow-up visit with this office.     Additionally, we reviewed our previous discussion about behavioral responses to surgery for which she should be vigilant. We discussed the post-surgical risks of mood deterioration, substance misuse, the development of compulsive behaviors and the development of maladaptive coping responses. She expressed understanding of these risks and agreed to contact our office with appropriate haste if any of these maladaptive responses were to develop after surgery.    Thank you for allowing me to collaborate in the evaluation of your patient. Please feel free to contact me if I can provide any additional information.    Initial Evaluation 1/29/24:    Televideo Informed Consent for psychological evaluation was reviewed with the patient as follows:  There are potential benefits and risks of the use of telephone or video-conferencing that differ from in-person sessions. Specifically, the telephone or televideo system we are using may not be HIPAA compliant and may present limits to patient confidentiality. Confidentiality still applies for telepsychology services, and nobody will record the session without your permission.     Understanding and verbal agreement was attested to by the patient. Patient identity was confirmed using 3 sources, including telephone number, email address and date of birth. Provision of services via telehealth was necessitated by the restrictions on face-to-face visits accompanying the COVID-19 pandemic.    Non-secure Note: The patient has consented to a nonrestricted note.    I had the pleasure of seeing DAVIDE KRAMER at your request for behavioral evaluation for appropriateness for bariatric surgery. As you know, MS. KRAMER is a 39 year old who reports a current weight of 260 lbs pounds and a BMI of approximately 43.    MS. KRAMER stated that she met with the surgeon once and she has met with the  dietitian a few times. She is currently working on completing her clearances.     WEIGHT HISTORY  MS. KRAMER stated that she struggled with her weight since childhood. In high school she stated that she weighed around 200+ lbs and she was sedentary. She stated that stress and depression have impacted her appetite over time leading her to eat more. She stated that when she skips meals, she often tends to eat more later in the day.     She stated that she has not attempted other forms of weight loss. She stated that she has not engaged in any specific dieting or exercise.     MS. KRAMER stated that the dietitian requested she make changes to her exercise, eating habits, dietary choices. She stated that she is working on changing her eating habits but has struggled in various areas. She stated that she is trying to eat more fruits and vegetables. She stated that she will eat apples and oranges but this is sometime difficult at work. She reported that her snacking habits have changed. She stated that she used to snack on chips and chocolate but she has significantly reduced this. She noted that she is not eating as late any more. She reported that she struggles to eat breakfast. She stated that she is aware of the 30-30-30 and attempting to include this in her schedule. She denies drinking pop or other carbonated beverages. She stated that she is drinking water all day (not tracking how much).     She noted that she has lost about 5 lbs since starting this process.     Daily Food Intake:  Breakfast: None  Lunch: Turkey sandwich with cheese and calix  Dinner: Roast, mashed potatoes, greens and corn on the cob  Snacks: Orange, popcicle       PSYCHOSOCIAL HISTORY  MS. KRAMER lives at home with her 6 year-old. She described her parents and her friends as her most significant supports. She stated that they are supportive of her decision to have weight loss surgery.     MS. KRAMER stated that she works at [employer].     MS.  NIA reported that she is walking for exercise and attempting to get about 2300 steps per day. She stated that she walked on the track in the past but stopped.     She reported occasional alcohol use (1-2 drinks per week). She denies use of tobacco/nicotine and other substances.     PSYCHOLOGICAL STATUS  MS. KRAMER stated that she has history of experiencing high stress and depressive symptoms. She stated that she has never been formally diagnosed or sought treatment for her symptoms. She stated that she experiences sadness, withdrawal, and fatigue. She stated that she experiences these symptoms about twice per week. She stated that she is currently coming out of a ten year relationship, which has impacted these symptoms.     She stated that she experiences excessive worry, chest pains, difficulty breathing, over-thinking. She stated that when she finds herself worrying she occasionally experiences panic attacks. She stated that she has learned to cope with these by sleeping, going outside for fresh air, sit down, attempt relaxation through distraction.     She reported that being overweight has always impacted her mental health. She stated that she was bullied as a child, which led to depression. She stated that she has not sought services in the past because she was worried about medication.     She stated that she engages in emotional eating, with the most recent experience happening last Thursday. She stated that she was feeling down due to financial stressors, which led to her eating popcorn, ice cream and pizza. She denies binge eating episodes.     She denies SI and history of suicide attempts.     She stated that she has engaged restriction where she has not allowed herself to eat for 1-1.5 days. She stated that she last engaged in this behavior about 2 weeks ago. She denies purging and excessive exercising.     Behavioral issues relevant for candidacy for bariatric surgery include:    1) Motivation:   MS. KRAMER is motivated by a desire to improve her health in order to keep up with her child and be more active.     2) History of compliance: MS. KRAMER reports no history of problems with compliance with medication regimens. She stated that she is currently taking a multivitamin and a hair skin and nail vitamin. She noted that she takes medication for high BP daily.    3) History of attempts to lose weight:  MS. KRAMER stated that she has not tried alternative forms of weight loss in the past. She denies trying dieting, diet programs or exercise for weight loss.     4) Coping resources and social support: MS. KRAMER reports significant support from family in her pursuit of bariatric surgery. She described some coping for stress and mood management (e.g., taking breaks for relaxation, finding distraction).     5) Ability to maintain behavior post-surgery: In my opinion, MS. KRAMER is in the process of preparing for the behavioral demands that are consequent to bariatric surgery. She stated that she has had some struggles recently in making the changes that are necessary for surgery. She is currently attempting to eat three meals per day but is finding that breakfast is the most difficult meal to include. She stated that she is attempting to include more fruits and vegetables. She has been successful in eliminating pop. She reported that she is attempting to walk more but has not had significant success in increasing her exercise.     6) Psychological contraindications to surgery: A comprehensive review of psychological symptoms reveals that MS. KRAMER is currently struggling with symptoms related to depression and anxiety and emotional eating. She stated that her last instance of emotional eating occurred on Thursday of last week and she ate ice cream and popcorn in response to recent stressors. She denies history of psychiatric treatment. She noted that she is open to therapy but not medication at this  time.     IMPRESSIONS    MS. KRAMER and I had discussion around referral to psychiatric services to help stabilize mood and anxiety and reduce emotional eating prior to surgery. Referral for services was submitted. We will meet again for final clearance following beginning therapy services.     Behavioral confirmation of her candidacy will come in the form of her ability to adhere to follow recommendations made by her dietitian and surgeon, attend therapy appointments, and stabilize mood.    Additionally, we had an extended discussion about behavioral responses to surgery for which she should be vigilant. We discussed the post-surgical risks of mood deterioration, substance misuse, the development of compulsive behaviors and the development of maladaptive coping responses. She expressed understanding of these risks and agreed to contact our office with appropriate haste if any of these maladaptive responses were to develop after surgery.    Thank you for allowing me to collaborate in the evaluation of your patient. Please feel free to contact me if I can provide any additional information.      Mental Status Examination:    Mental Status Exam:  Orientation:  Alert. Oriented x3.  Memory: intact.  Attention/Concentration: Normal/ Good.  Appearance:  Well-groomed. Casually Dressed. Good hygiene.   Behavior/Attitude: Cooperative. Pleasant. Good eye contact.  Motor: Relaxed. Calm. Normal motor activity.   Speech: Regular rate and volume. Fluent. No pressure.   Mood: Depressed and anxious  Affect: Congruent to stated mood.   Thought process: Goal-directed. Organized.  Thought content: No paranoia, delusion or ideas of reference. No AVH   Suicidal ideation: denied.  Homicidal ideation: denied.   Insight: Good  Judgment: Fair  Fund of knowledge: Average      Kenyetta Hill Psy.D.  Pronouns - she  her  hers  Licensed Clinical Psychologist    [facility]  [facility]  Phone: [phone]  [email or web address]

## 2024-06-10 LAB
ATRIAL RATE: 74 BPM
P AXIS: 41 DEGREES
P OFFSET: 195 MS
P ONSET: 139 MS
PR INTERVAL: 158 MS
Q ONSET: 218 MS
QRS COUNT: 12 BEATS
QRS DURATION: 76 MS
QT INTERVAL: 396 MS
QTC CALCULATION(BAZETT): 439 MS
QTC FREDERICIA: 425 MS
R AXIS: 19 DEGREES
T AXIS: 20 DEGREES
T OFFSET: 416 MS
VENTRICULAR RATE: 74 BPM

## 2024-06-20 ENCOUNTER — HOSPITAL ENCOUNTER (OUTPATIENT)
Dept: CARDIOLOGY | Facility: HOSPITAL | Age: 39
Discharge: HOME | End: 2024-06-20
Payer: COMMERCIAL

## 2024-06-20 DIAGNOSIS — R94.31 ABNORMAL ELECTROCARDIOGRAM (ECG) (EKG): ICD-10-CM

## 2024-06-20 DIAGNOSIS — Z01.818 PREOPERATIVE CLEARANCE: ICD-10-CM

## 2024-06-20 PROCEDURE — 93306 TTE W/DOPPLER COMPLETE: CPT | Performed by: INTERNAL MEDICINE

## 2024-06-20 PROCEDURE — 93306 TTE W/DOPPLER COMPLETE: CPT

## 2024-06-22 LAB
AORTIC VALVE MEAN GRADIENT: 5 MMHG
AORTIC VALVE PEAK VELOCITY: 1.57 M/S
AV PEAK GRADIENT: 9.9 MMHG
AVA (PEAK VEL): 2.59 CM2
AVA (VTI): 2.3 CM2
EJECTION FRACTION APICAL 4 CHAMBER: 69.5
EJECTION FRACTION: 69 %
LEFT ATRIUM VOLUME AREA LENGTH INDEX BSA: 29.6 ML/M2
LEFT VENTRICLE INTERNAL DIMENSION DIASTOLE: 4.05 CM (ref 3.5–6)
LEFT VENTRICULAR OUTFLOW TRACT DIAMETER: 2.2 CM
MITRAL VALVE E/A RATIO: 1.35
RIGHT VENTRICLE FREE WALL PEAK S': 14 CM/S
RIGHT VENTRICLE PEAK SYSTOLIC PRESSURE: 18.2 MMHG
TRICUSPID ANNULAR PLANE SYSTOLIC EXCURSION: 2.1 CM

## 2024-06-25 ENCOUNTER — CLINICAL SUPPORT (OUTPATIENT)
Dept: SLEEP MEDICINE | Facility: CLINIC | Age: 39
End: 2024-06-25
Payer: COMMERCIAL

## 2024-06-25 ENCOUNTER — OFFICE VISIT (OUTPATIENT)
Dept: SLEEP MEDICINE | Facility: HOSPITAL | Age: 39
End: 2024-06-25
Payer: COMMERCIAL

## 2024-06-25 DIAGNOSIS — G47.9 SLEEP DISORDER, UNSPECIFIED: ICD-10-CM

## 2024-06-25 DIAGNOSIS — G47.30 SLEEP DISORDER BREATHING: Primary | ICD-10-CM

## 2024-06-25 DIAGNOSIS — G47.30 SLEEP DISORDER BREATHING: ICD-10-CM

## 2024-06-25 PROCEDURE — 1036F TOBACCO NON-USER: CPT | Performed by: GENERAL PRACTICE

## 2024-06-25 PROCEDURE — 99204 OFFICE O/P NEW MOD 45 MIN: CPT | Performed by: GENERAL PRACTICE

## 2024-06-25 PROCEDURE — 95810 POLYSOM 6/> YRS 4/> PARAM: CPT | Performed by: GENERAL PRACTICE

## 2024-06-25 PROCEDURE — 3008F BODY MASS INDEX DOCD: CPT | Performed by: GENERAL PRACTICE

## 2024-06-25 ASSESSMENT — SLEEP AND FATIGUE QUESTIONNAIRES
SLEEP_PROBLEM_NOTICEABLE_TO_OTHERS: SOMEWHAT
HOW LIKELY ARE YOU TO NOD OFF OR FALL ASLEEP WHILE LYING DOWN TO REST IN THE AFTERNOON WHEN CIRCUMSTANCES PERMIT: MODERATE CHANCE OF DOZING
HOW LIKELY ARE YOU TO NOD OFF OR FALL ASLEEP WHILE SITTING AND TALKING TO SOMEONE: WOULD NEVER DOZE
DIFFICULTY_FALLING_ASLEEP: MODERATE
SITING INACTIVE IN A PUBLIC PLACE LIKE A CLASS ROOM OR A MOVIE THEATER: WOULD NEVER DOZE
HOW LIKELY ARE YOU TO NOD OFF OR FALL ASLEEP WHILE SITTING AND READING: WOULD NEVER DOZE
HOW LIKELY ARE YOU TO NOD OFF OR FALL ASLEEP IN A CAR, WHILE STOPPED FOR A FEW MINUTES IN TRAFFIC: WOULD NEVER DOZE
SATISFACTION_WITH_CURRENT_SLEEP_PATTERN: DISSATISFIED
WORRIED_DISTRESSED_DUE_TO_SLEEP: A LITTLE
SLEEP_PROBLEM_INTERFERES_DAILY_ACTIVITIES: SOMEWHAT
HOW LIKELY ARE YOU TO NOD OFF OR FALL ASLEEP WHEN YOU ARE A PASSENGER IN A CAR FOR AN HOUR WITHOUT A BREAK: WOULD NEVER DOZE
HOW LIKELY ARE YOU TO NOD OFF OR FALL ASLEEP WHILE SITTING QUIETLY AFTER LUNCH WITHOUT ALCOHOL: WOULD NEVER DOZE
HOW LIKELY ARE YOU TO NOD OFF OR FALL ASLEEP WHILE WATCHING TV: MODERATE CHANCE OF DOZING
ESS-CHAD TOTAL SCORE: 4

## 2024-06-25 ASSESSMENT — ENCOUNTER SYMPTOMS
DEPRESSION: 0
OCCASIONAL FEELINGS OF UNSTEADINESS: 0
LOSS OF SENSATION IN FEET: 1

## 2024-06-25 ASSESSMENT — PATIENT HEALTH QUESTIONNAIRE - PHQ9
1. LITTLE INTEREST OR PLEASURE IN DOING THINGS: NOT AT ALL
SUM OF ALL RESPONSES TO PHQ9 QUESTIONS 1 AND 2: 0
2. FEELING DOWN, DEPRESSED OR HOPELESS: NOT AT ALL

## 2024-06-25 NOTE — PROGRESS NOTES
Patient: Анна Bird    [id number]  : 1985 -- AGE 39 y.o.    Provider: Lorena Berg DO     [facility]   Service Date: 2024              [facility]  New Visit Note        HISTORY OF PRESENT ILLNESS     Virtual or Telephone Consent  An interactive audio and video telecommunication system which permits real time communications between the patient (at the originating site) and provider (at the distant site) was utilized to provide this telehealth service.     Verbal consent was requested and obtained from Анна Bird on this date, 24 for a telehealth visit.     HISTORY OF PRESENT ILLNESS   Анна Bird is a 39 y.o. female who presents to a [facility] for a sleep medicine evaluation with concerns of Sleep Study (Needs a sleep study for bariatric surgery).     The patient  has a past medical history of Abnormal chromosomal and genetic finding on  screening of mother (2016), Chondrocostal junction syndrome (tietze) (2015), Encounter for  screening for Streptococcus B ([facility]), Encounter for routine checking of intrauterine contraceptive device (2016), Encounter for routine postpartum follow-up ([facility]) (2017), Encounter for screening for diseases of the blood and blood-forming organs and certain disorders involving the immune mechanism (2015), Encounter for supervision of other normal pregnancy, unspecified trimester ([facility]) (2020), Nocturia (2016), Obesity complicating pregnancy, unspecified trimester ([facility]) (2017), Other specified abnormal immunological findings in serum (2015), Other specified personal risk factors, not elsewhere classified, Personal history of other diseases of the musculoskeletal system and connective tissue (2015), Personal history of other endocrine, nutritional and metabolic  disease (10/29/2013), Personal history of other specified conditions (05/19/2014), Personal history of other specified conditions (10/24/2013), Personal history of other specified conditions (11/04/2016), Personal history of other specified conditions (04/12/2018), Retention of urine, unspecified (01/17/2020), Supervision of pregnancy with history of pre-term labor, unspecified trimester ([facility]) (01/31/2017), Unspecified pre-existing hypertension complicating pregnancy, unspecified trimester ([facility]) (02/14/2017), and Urge incontinence..    PAST SLEEP HISTORY    Patient has a pmhx including HTN, overactive bladder?.     On today's visit, 6/25/2024, the patient reports that she is trying to get bariatric surgery done around August 2024. She would like to be tested for sleep apnea.     Sleep schedule  on weekdays / work days:  Usual Bedtime: 8-9pm  Sleep latency: 30-60min  Wake time : 6am  Total sleep time average/day: 7 hours/day  Awakenings: 4x per night, nocturia, short.   Naps: no      Sleep schedule  on weekends/non work days :  Usual Bedtime:   10-10:30pm  Wake time : 9-10am    Sleep aids: n  Stimulants: n    Occupation: pharmacy supervisor     Preferred sleeping position: SLEEP POSITION: sidelying    Sleep-related ROS:    Snoring:  n  Witnessed apneas:  n      Gasping/ choking: n    Am Dry mouth:   n          Nasal congestion: n        am headaches: n    Sleep is described as refreshing.     Daytime sleepiness: sometimes  Fatigue or decreased energy: sometimes  Difficulty remembering things in daytime: n  Difficulty staying focused in daytime: : n  Irritable during the day: n    Drowsy driving: n  Hx of car accident: n  Near-miss Car accident: n      RLS screen:  RLSSCREEN: - Sensations: Patient does not have unusual sensations in their extremities that cause an urge to move them   Hx sciatica     Sleep-related behaviors: DENIES      ESS: 4       REVIEW OF SYSTEMS     REVIEW OF SYSTEMS  Review of Systems    All other systems reviewed and are negative.      ALLERGIES AND MEDICATIONS     ALLERGIES  Allergies   Allergen Reactions    Iodinated Contrast Media Unknown       MEDICATIONS  Current Outpatient Medications   Medication Sig Dispense Refill    amLODIPine (Norvasc) 5 mg tablet Take 1 tablet (5 mg) by mouth once daily. 90 tablet 3    chlorthalidone (Hygroton) 25 mg tablet Take 1 tablet (25 mg) by mouth once daily. 90 tablet 3    cyclobenzaprine (Flexeril) 10 mg tablet Take 1 tablet (10 mg) by mouth 3 times a day as needed for muscle spasms. 90 tablet 11    diclofenac sodium 1 % kit Apply topically once daily. Apply sparingly to affected area(s)      DULoxetine (Cymbalta) 30 mg DR capsule Take once capsule for 7 days then take 2 capsules daily. 60 capsule 3    ferrous sulfate 325 (65 Fe) MG tablet Take 1 tablet by mouth every other day. Without food to increase absorption      omeprazole (PriLOSEC) 20 mg DR capsule Take 1 capsule (20 mg) by mouth once daily with breakfast.       No current facility-administered medications for this visit.         PAST HISTORY     PAST MEDICAL HISTORY  She  has a past medical history of Abnormal chromosomal and genetic finding on  screening of mother (2016), Chondrocostal junction syndrome (tietze) (2015), Encounter for  screening for Streptococcus B ([facility]), Encounter for routine checking of intrauterine contraceptive device (2016), Encounter for routine postpartum follow-up ([facility]) (2017), Encounter for screening for diseases of the blood and blood-forming organs and certain disorders involving the immune mechanism (2015), Encounter for supervision of other normal pregnancy, unspecified trimester ([facility]) (2020), Nocturia (2016), Obesity complicating pregnancy, unspecified trimester ([facility]) (2017), Other specified abnormal immunological findings in serum (2015), Other specified personal risk factors, not  elsewhere classified, Personal history of other diseases of the musculoskeletal system and connective tissue (06/30/2015), Personal history of other endocrine, nutritional and metabolic disease (10/29/2013), Personal history of other specified conditions (05/19/2014), Personal history of other specified conditions (10/24/2013), Personal history of other specified conditions (11/04/2016), Personal history of other specified conditions (04/12/2018), Retention of urine, unspecified (01/17/2020), Supervision of pregnancy with history of pre-term labor, unspecified trimester ([facility]) (01/31/2017), Unspecified pre-existing hypertension complicating pregnancy, unspecified trimester ([facility]) (02/14/2017), and Urge incontinence.      PAST SURGICAL HISTORY:  Past Surgical History:   Procedure Laterality Date    CHOLECYSTECTOMY  08/14/2013    Cholecystectomy Laparoscopic       FAMILY HISTORY  Family History   Problem Relation Name Age of Onset    Hypertension Mother Grandmother     Cancer Mother Grandmother     Diabetes Mother Grandmother     Hypertension Father Regulo     Arthritis Father Regulo     Cancer Paternal Grandmother Gabby      DOES/DOES NOT EC: does not have a family history of sleep disorder.      SOCIAL HISTORY  She  reports that she has never smoked. She has never used smokeless tobacco. She reports current alcohol use of about 2.0 standard drinks of alcohol per week. She reports that she does not use drugs.     Caffeine consumption: no      PHYSICAL EXAM     VITAL SIGNS: There were no vitals taken for this visit.     PREVIOUS WEIGHTS:  Wt Readings from Last 3 Encounters:   05/22/24 117 kg (257 lb 9.6 oz)   04/08/24 126 kg (278 lb)   03/15/24 126 kg (278 lb)       Physical Exam  CONSTITUTIONAL: Patient appears well developed and well nourished.   GENERAL: This is a healthy appearing patient . The patient is alert and appropriately verbally conversant   FACE: The face was inspected and no cutaneous masses or  lesions were visualized.   EYES: Extra-ocular muscle function was intact.   ORAL CAVITY: Examination of the oral cavity revealed no mass lesions nor infection.   EARS: Examination of the ears revealed that the auricles were normally formed with no lesions.   NECK: No skin lesions or inflammatory processes were detected on visual inspection.  CARDIOVASCULAR: Peripheral perfusion intact, no evidence of cyanosis, or clubbing.   RESPIRATORY: Normal inspiration and expiration and chest wall expansion, no use of accessory muscles to breathe, no stridor.  NEUROLOGICAL: Mentation is clear. Alert and oriented to time, place, and person. Answering questions appropriately.  PSYCHIATRIC: Normal affect and appropriate behavior. Mood is without obvious agitation or disturbance.         RESULTS/DATA     Iron (ug/dL)   Date Value   2022 23 (L)     Iron Saturation (%)   Date Value   2022 6 (L)     TIBC (ug/dL)   Date Value   2022 415     Ferritin (ug/L)   Date Value   2022 16               ASSESSMENT/PLAN     Ms. Bird is a 39 y.o. female and  has a past medical history of Abnormal chromosomal and genetic finding on  screening of mother (2016), Chondrocostal junction syndrome (tietze) (2015), Encounter for  screening for Streptococcus B ([facility]), Encounter for routine checking of intrauterine contraceptive device (2016), Encounter for routine postpartum follow-up ([facility]) (2017), Encounter for screening for diseases of the blood and blood-forming organs and certain disorders involving the immune mechanism (2015), Encounter for supervision of other normal pregnancy, unspecified trimester ([facility]) (2020), Nocturia (2016), Obesity complicating pregnancy, unspecified trimester ([facility]) (2017), Other specified abnormal immunological findings in serum (2015), Other specified personal risk factors, not elsewhere classified, Personal  history of other diseases of the musculoskeletal system and connective tissue (06/30/2015), Personal history of other endocrine, nutritional and metabolic disease (10/29/2013), Personal history of other specified conditions (05/19/2014), Personal history of other specified conditions (10/24/2013), Personal history of other specified conditions (11/04/2016), Personal history of other specified conditions (04/12/2018), Retention of urine, unspecified (01/17/2020), Supervision of pregnancy with history of pre-term labor, unspecified trimester ([facility]) (01/31/2017), Unspecified pre-existing hypertension complicating pregnancy, unspecified trimester ([facility]) (02/14/2017), and Urge incontinence. She was referred to the [facility] for evaluation of suspected sleep apnea.     Problem List Items Addressed This Visit    None      Problem List and Orders    Pmhx includes HTN, overactive bladder?    1- sleep disorder breathing  Symptoms include night time awakenings, nocturia, sometimes daytime sleepiness and fatigue.     -ordered sleep study.     -do not drive or operate heavy machinery if drowsy.  -avoid sedating substances/ medication, alcohol, illicit drugs and tobacco.    2- Obesity  counseled on eating a healthy diet and exercising as tolerated.  Hoping to get bariatric surgery done around August 2024      Follow up after sleep study or sooner as needed.

## 2024-06-26 VITALS
HEIGHT: 65 IN | BODY MASS INDEX: 41.51 KG/M2 | SYSTOLIC BLOOD PRESSURE: 143 MMHG | WEIGHT: 249.12 LBS | DIASTOLIC BLOOD PRESSURE: 86 MMHG

## 2024-06-26 ASSESSMENT — PAIN SCALES - GENERAL: PAINLEVEL: 0-NO PAIN

## 2024-06-26 NOTE — PROGRESS NOTES
[facility] TECH NOTE:     Patient: Анна Bird   MRN//AGE: [id number]  1985  39 y.o.   Technologist: DEANA DUMONT   Room: 5   Service Date: 2024        Sleep Testing Location:  [facility]     [place]: 4, 39    TECHNOLOGIST SLEEP STUDY PROCEDURE NOTE:   This sleep study is being conducted according to the policies and procedures outlined by the [employer] accreditation standards.  The sleep study procedure and processes involved during this appointment was explained to the patient/patient’s family, questions were answered. The patient/family verbalized understanding.      The patient is a 39 y.o. year old female scheduled for aDiagnostic PSG Split night with montage of:  PSG  she arrived for her appointment.      The study that was ultimately completed was aDiagnostic PSG Split night with montage of: PSG     The full study Was completed.  Patient questionnaires completed?: yes     Consents signed? yes    Initial Fall Risk Screening:     Анна has not fallen in the last 6 months. her did not result in injury. Анна does not have a fear of falling. He does not need assistance with sitting, standing, or walking. she does not need assistance walking in her home. she does not need assistance in an unfamiliar setting. The patient is notusing an assistive device.     Brief Study observations: Pt refused cpap consent.  Pt had frequent nocturia.     Deviation to order/protocol and reason: n/a      If PAP, which was preferred mask/pressure/mode: n/a     Other:None    After the procedure, the patient/family was informed to ensure followup with ordering clinician for testing results.      Technologist: DEANA DUMONT

## 2024-07-22 ENCOUNTER — APPOINTMENT (OUTPATIENT)
Dept: ENDOCRINOLOGY | Facility: CLINIC | Age: 39
End: 2024-07-22
Payer: COMMERCIAL

## 2024-07-23 ENCOUNTER — NUTRITION (OUTPATIENT)
Dept: SURGERY | Facility: HOSPITAL | Age: 39
End: 2024-07-23
Payer: COMMERCIAL

## 2024-07-23 VITALS — WEIGHT: 251 LBS | BODY MASS INDEX: 41.82 KG/M2

## 2024-07-23 DIAGNOSIS — E66.01 OBESITY, CLASS III, BMI 40-49.9 (MORBID OBESITY) (MULTI): ICD-10-CM

## 2024-07-23 DIAGNOSIS — Z98.84 BARIATRIC SURGERY STATUS: Primary | ICD-10-CM

## 2024-07-23 NOTE — PROGRESS NOTES
Bariatric Surgery Program - [facility]    PREOPERATIVE, MULTIDISCIPLINARY, MEDICALLY SUPERVISED, REDUCED CALORIE DIET, BEHAVIOR MODIFICATION AND EXERCISE PROGRAM       S: MSWL visit 5/6  IW: 268# LV weight: 257#  Patient appears excited today that she has lost 6# since LV. Patient reports she is more consistent with eating 3 meals/day, some snacks as needed. Drinking water and unsweetened tea. Patient sates she is practicing postop behaviors. Exercise is consistent. Patient has demonstrated appropriate lifestyle and diet modification to prepare her for surgery. Patient is cleared from nutrition standpoint at this time.     Diet recall:  B - cheerios, fruit, green tea / x2 HB eggs, x2 sausages  L - tuna, fruit, water  / tuna and crackers  D - none / meat, vegetables   Snacks - popcorn, fruit, and/or nuts   Fluids: >64oz water, green tea - unsweetened      Exercise: 15 stairs up/down x3, 3 times/day     O: Wt: 251 lb   Ht:  65 in   BMI: 41.8    Goal: 5% body weight loss over the course of program - MET     Dietary recommendation:   1. Continue to drink 64oz water daily.  2. Practice the 30-30-30 rule by drinking between meals.  3. Continue to eat 3 meals and 1 snack daily.  4. Have protein rich foods at each meal, aim for 3-4 ounces (20-30 grams) per meal. Have a protein drink as needed if your appetite is low.   5. Continue to build on to your exercise routine. Consider chair based exercises to add more variety to your routine.        A/P: Pt is reporting consistency with following recommended meal plan and meeting fluid and exercise goals. Patient is encouraged to continue to follow routine, and encouraged to try chair based exercises to increase PA goal. Patient will follow up in 1 month for final MSWL visit.

## 2024-07-26 ENCOUNTER — APPOINTMENT (OUTPATIENT)
Dept: PULMONOLOGY | Facility: CLINIC | Age: 39
End: 2024-07-26
Payer: COMMERCIAL

## 2024-07-26 ENCOUNTER — OFFICE VISIT (OUTPATIENT)
Dept: SLEEP MEDICINE | Facility: CLINIC | Age: 39
End: 2024-07-26
Payer: COMMERCIAL

## 2024-07-26 DIAGNOSIS — G47.30 SLEEP DISORDER BREATHING: Primary | ICD-10-CM

## 2024-07-26 PROCEDURE — 1036F TOBACCO NON-USER: CPT | Performed by: GENERAL PRACTICE

## 2024-07-26 PROCEDURE — 99213 OFFICE O/P EST LOW 20 MIN: CPT | Performed by: GENERAL PRACTICE

## 2024-07-26 ASSESSMENT — SLEEP AND FATIGUE QUESTIONNAIRES
SITING INACTIVE IN A PUBLIC PLACE LIKE A CLASS ROOM OR A MOVIE THEATER: WOULD NEVER DOZE
HOW LIKELY ARE YOU TO NOD OFF OR FALL ASLEEP WHILE WATCHING TV: SLIGHT CHANCE OF DOZING
HOW LIKELY ARE YOU TO NOD OFF OR FALL ASLEEP IN A CAR, WHILE STOPPED FOR A FEW MINUTES IN TRAFFIC: WOULD NEVER DOZE
HOW LIKELY ARE YOU TO NOD OFF OR FALL ASLEEP WHILE SITTING QUIETLY AFTER LUNCH WITHOUT ALCOHOL: WOULD NEVER DOZE
HOW LIKELY ARE YOU TO NOD OFF OR FALL ASLEEP WHEN YOU ARE A PASSENGER IN A CAR FOR AN HOUR WITHOUT A BREAK: WOULD NEVER DOZE
ESS-CHAD TOTAL SCORE: 2
HOW LIKELY ARE YOU TO NOD OFF OR FALL ASLEEP WHILE SITTING AND TALKING TO SOMEONE: WOULD NEVER DOZE
HOW LIKELY ARE YOU TO NOD OFF OR FALL ASLEEP WHILE SITTING AND READING: WOULD NEVER DOZE
HOW LIKELY ARE YOU TO NOD OFF OR FALL ASLEEP WHILE LYING DOWN TO REST IN THE AFTERNOON WHEN CIRCUMSTANCES PERMIT: SLIGHT CHANCE OF DOZING

## 2024-07-26 ASSESSMENT — ENCOUNTER SYMPTOMS
DEPRESSION: 0
LOSS OF SENSATION IN FEET: 1
OCCASIONAL FEELINGS OF UNSTEADINESS: 0

## 2024-07-26 ASSESSMENT — COLUMBIA-SUICIDE SEVERITY RATING SCALE - C-SSRS
2. HAVE YOU ACTUALLY HAD ANY THOUGHTS OF KILLING YOURSELF?: NO
6. HAVE YOU EVER DONE ANYTHING, STARTED TO DO ANYTHING, OR PREPARED TO DO ANYTHING TO END YOUR LIFE?: NO
1. IN THE PAST MONTH, HAVE YOU WISHED YOU WERE DEAD OR WISHED YOU COULD GO TO SLEEP AND NOT WAKE UP?: NO

## 2024-07-26 NOTE — PROGRESS NOTES
Patient: Анна Bird    [id number]  : 1985 -- AGE 39 y.o.    Provider: Lorena Berg DO     [facility]   Service Date: 2024              [facility]  Follow up Visit Note        HISTORY OF PRESENT ILLNESS     Virtual or Telephone Consent  An interactive audio and video telecommunication system which permits real time communications between the patient (at the originating site) and provider (at the distant site) was utilized to provide this telehealth service.     Verbal consent was requested and obtained from Анна Bird on this date, 24 for a telehealth visit.     HISTORY OF PRESENT ILLNESS   Анна Bird is a 39 y.o. female who presents to a [facility] for a sleep medicine evaluation with concerns of Sleep Study (Sleep study results ).     SLEEP HISTORY    Patient has a pmhx including HTN, overactive bladder?.     24 the patient reports that she is trying to get bariatric surgery done around 2024. She would like to be tested for sleep apnea.     24  Patient had a sleep study done and would like to discuss her results. She may be getting bariatric surgery done around 2024.     Sleep schedule  on weekdays / work days:  Usual Bedtime: 8-9pm  Sleep latency: 30-60min  Wake time : 6am  Total sleep time average/day: 7 hours/day  Awakenings: 4x per night, nocturia, short.   Naps: no      Sleep schedule  on weekends/non work days :  Usual Bedtime:   10-10:30pm  Wake time : 9-10am    Sleep aids: n  Stimulants: n    Occupation: pharmacy supervisor     Preferred sleeping position: SLEEP POSITION: sidelying    Sleep-related ROS:    Snoring:  n  Witnessed apneas:  n      Gasping/ choking: n    Am Dry mouth:   n          Nasal congestion: n        am headaches: n    Sleep is described as refreshing.     Daytime sleepiness: sometimes  Fatigue or decreased energy:  sometimes  Difficulty remembering things in daytime: n  Difficulty staying focused in daytime: : n  Irritable during the day: n    Drowsy driving: n  Hx of car accident: n  Near-miss Car accident: n      RLS screen:  RLSSCREEN: - Sensations: Patient does not have unusual sensations in their extremities that cause an urge to move them   Hx sciatica     Sleep-related behaviors: DENIES      ESS: 2       REVIEW OF SYSTEMS     REVIEW OF SYSTEMS  Review of Systems   All other systems reviewed and are negative.      ALLERGIES AND MEDICATIONS     ALLERGIES  Allergies   Allergen Reactions    Iodinated Contrast Media Unknown       MEDICATIONS  Current Outpatient Medications   Medication Sig Dispense Refill    amLODIPine (Norvasc) 5 mg tablet Take 1 tablet (5 mg) by mouth once daily. 90 tablet 3    chlorthalidone (Hygroton) 25 mg tablet Take 1 tablet (25 mg) by mouth once daily. 90 tablet 3    cyclobenzaprine (Flexeril) 10 mg tablet Take 1 tablet (10 mg) by mouth 3 times a day as needed for muscle spasms. 90 tablet 11    diclofenac sodium 1 % kit Apply topically once daily. Apply sparingly to affected area(s)      DULoxetine (Cymbalta) 30 mg DR capsule Take once capsule for 7 days then take 2 capsules daily. 60 capsule 3    ferrous sulfate 325 (65 Fe) MG tablet Take 1 tablet by mouth every other day. Without food to increase absorption      omeprazole (PriLOSEC) 20 mg DR capsule Take 1 capsule (20 mg) by mouth once daily with breakfast.       No current facility-administered medications for this visit.         PAST HISTORY     PAST MEDICAL HISTORY  She  has a past medical history of Abnormal chromosomal and genetic finding on  screening of mother (2016), Chondrocostal junction syndrome (tietze) (2015), Encounter for  screening for Streptococcus B ([facility]), Encounter for routine checking of intrauterine contraceptive device (2016), Encounter for routine postpartum follow-up ([facility])  (03/27/2017), Encounter for screening for diseases of the blood and blood-forming organs and certain disorders involving the immune mechanism (06/22/2015), Encounter for supervision of other normal pregnancy, unspecified trimester ([facility]) (07/13/2020), Nocturia (01/25/2016), Obesity complicating pregnancy, unspecified trimester ([facility]) (02/14/2017), Other specified abnormal immunological findings in serum (07/08/2015), Other specified personal risk factors, not elsewhere classified, Personal history of other diseases of the musculoskeletal system and connective tissue (06/30/2015), Personal history of other endocrine, nutritional and metabolic disease (10/29/2013), Personal history of other specified conditions (05/19/2014), Personal history of other specified conditions (10/24/2013), Personal history of other specified conditions (11/04/2016), Personal history of other specified conditions (04/12/2018), Retention of urine, unspecified (01/17/2020), Supervision of pregnancy with history of pre-term labor, unspecified trimester ([facility]) (01/31/2017), Unspecified pre-existing hypertension complicating pregnancy, unspecified trimester ([facility]) (02/14/2017), and Urge incontinence.      PAST SURGICAL HISTORY:  Past Surgical History:   Procedure Laterality Date    CHOLECYSTECTOMY  08/14/2013    Cholecystectomy Laparoscopic       FAMILY HISTORY  Family History   Problem Relation Name Age of Onset    Hypertension Mother Grandmother     Cancer Mother Grandmother     Diabetes Mother Grandmother     Hypertension Father Regulo     Arthritis Father Regulo     Cancer Paternal Grandmother Gabby      DOES/DOES NOT EC: does not have a family history of sleep disorder.      SOCIAL HISTORY  She  reports that she has never smoked. She has never used smokeless tobacco. She reports current alcohol use of about 2.0 standard drinks of alcohol per week. She reports that she does not use drugs.     Caffeine consumption:  no      PHYSICAL EXAM     VITAL SIGNS: There were no vitals taken for this visit.     PREVIOUS WEIGHTS:  Wt Readings from Last 3 Encounters:   24 114 kg (251 lb)   24 113 kg (249 lb 1.9 oz)   24 117 kg (257 lb 9.6 oz)       Physical Exam  Constitutional: Alert and oriented, cooperative, no obvious distress.   HENT: normocephalic.   Neurologic: AOx3.   psychiatric: appropriate mood and affect.    RESULTS/DATA     Iron (ug/dL)   Date Value   2022 23 (L)     Iron Saturation (%)   Date Value   2022 6 (L)     TIBC (ug/dL)   Date Value   2022 415     Ferritin (ug/L)   Date Value   2022 16               ASSESSMENT/PLAN     Ms. Bird is a 39 y.o. female and  has a past medical history of Abnormal chromosomal and genetic finding on  screening of mother (2016), Chondrocostal junction syndrome (tietze) (2015), Encounter for  screening for Streptococcus B ([facility]), Encounter for routine checking of intrauterine contraceptive device (2016), Encounter for routine postpartum follow-up ([facility]) (2017), Encounter for screening for diseases of the blood and blood-forming organs and certain disorders involving the immune mechanism (2015), Encounter for supervision of other normal pregnancy, unspecified trimester ([facility]) (2020), Nocturia (2016), Obesity complicating pregnancy, unspecified trimester ([facility]) (2017), Other specified abnormal immunological findings in serum (2015), Other specified personal risk factors, not elsewhere classified, Personal history of other diseases of the musculoskeletal system and connective tissue (2015), Personal history of other endocrine, nutritional and metabolic disease (10/29/2013), Personal history of other specified conditions (2014), Personal history of other specified conditions (10/24/2013), Personal history of other specified conditions (2016), Personal  history of other specified conditions (04/12/2018), Retention of urine, unspecified (01/17/2020), Supervision of pregnancy with history of pre-term labor, unspecified trimester ([facility]) (01/31/2017), Unspecified pre-existing hypertension complicating pregnancy, unspecified trimester ([facility]) (02/14/2017), and Urge incontinence. She is following up on her sleep study results.   Problem List Items Addressed This Visit    None      Problem List and Orders    Pmhx includes HTN, overactive bladder?    1- sleep disorder   Symptoms include night time awakenings, nocturia, sometimes daytime sleepiness and fatigue.     PSG 6/25/24 --> mild BARBARA using AASM criteria, did not meet CMS criteria for sleep apnea. RDI 3% 12.9, RDI 4% 4. SpO2 emmy 90%.     Reviewed and discussed the above sleep study results and management options in details. All questions answered, patient verbalizes understanding.     Use PAP as soon as able / during recovery.  Keep your head of the bed at 30* or higher.  I advise judicious use of pain medications post operatively as pain medications can make sleep apnea worse.  I advise close monitoring of the patient post operatively. Patient may be at higher risk of postoperative respiratory complications.   Patient understands the risk of post operative complications are higher for patients with sleep apnea.   verbalizes understanding of the above instructions and risks.    -do not drive or operate heavy machinery if drowsy.  -avoid sleeping on your back.   -avoid sedating substances/ medication, alcohol, illicit drugs and tobacco.    2- Obesity  counseled on eating a healthy diet and exercising as tolerated.  Hoping to get bariatric surgery done around August 2024      Follow up as needed.
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
## 2024-08-08 ENCOUNTER — OFFICE VISIT (OUTPATIENT)
Dept: PULMONOLOGY | Facility: HOSPITAL | Age: 39
End: 2024-08-08
Payer: COMMERCIAL

## 2024-08-08 VITALS
WEIGHT: 261.3 LBS | DIASTOLIC BLOOD PRESSURE: 88 MMHG | TEMPERATURE: 97.3 F | BODY MASS INDEX: 43.53 KG/M2 | HEART RATE: 84 BPM | OXYGEN SATURATION: 97 % | SYSTOLIC BLOOD PRESSURE: 122 MMHG

## 2024-08-08 DIAGNOSIS — G47.33 OBSTRUCTIVE SLEEP APNEA: ICD-10-CM

## 2024-08-08 DIAGNOSIS — Z01.818 PREOPERATIVE CLEARANCE: Primary | ICD-10-CM

## 2024-08-08 PROCEDURE — 3079F DIAST BP 80-89 MM HG: CPT | Performed by: STUDENT IN AN ORGANIZED HEALTH CARE EDUCATION/TRAINING PROGRAM

## 2024-08-08 PROCEDURE — 3074F SYST BP LT 130 MM HG: CPT | Performed by: STUDENT IN AN ORGANIZED HEALTH CARE EDUCATION/TRAINING PROGRAM

## 2024-08-08 PROCEDURE — 99213 OFFICE O/P EST LOW 20 MIN: CPT | Mod: GC | Performed by: STUDENT IN AN ORGANIZED HEALTH CARE EDUCATION/TRAINING PROGRAM

## 2024-08-08 PROCEDURE — 1036F TOBACCO NON-USER: CPT | Performed by: STUDENT IN AN ORGANIZED HEALTH CARE EDUCATION/TRAINING PROGRAM

## 2024-08-08 PROCEDURE — 99203 OFFICE O/P NEW LOW 30 MIN: CPT | Performed by: STUDENT IN AN ORGANIZED HEALTH CARE EDUCATION/TRAINING PROGRAM

## 2024-08-08 SDOH — ECONOMIC STABILITY: FOOD INSECURITY: WITHIN THE PAST 12 MONTHS, YOU WORRIED THAT YOUR FOOD WOULD RUN OUT BEFORE YOU GOT MONEY TO BUY MORE.: NEVER TRUE

## 2024-08-08 SDOH — ECONOMIC STABILITY: FOOD INSECURITY: WITHIN THE PAST 12 MONTHS, THE FOOD YOU BOUGHT JUST DIDN'T LAST AND YOU DIDN'T HAVE MONEY TO GET MORE.: NEVER TRUE

## 2024-08-08 ASSESSMENT — LIFESTYLE VARIABLES
HOW MANY STANDARD DRINKS CONTAINING ALCOHOL DO YOU HAVE ON A TYPICAL DAY: 1 OR 2
SKIP TO QUESTIONS 9-10: 1
HOW OFTEN DO YOU HAVE SIX OR MORE DRINKS ON ONE OCCASION: NEVER
HOW OFTEN DO YOU HAVE A DRINK CONTAINING ALCOHOL: MONTHLY OR LESS
AUDIT-C TOTAL SCORE: 1

## 2024-08-08 ASSESSMENT — PAIN SCALES - GENERAL: PAINLEVEL: 0-NO PAIN

## 2024-08-08 ASSESSMENT — PATIENT HEALTH QUESTIONNAIRE - PHQ9
SUM OF ALL RESPONSES TO PHQ9 QUESTIONS 1 & 2: 0
1. LITTLE INTEREST OR PLEASURE IN DOING THINGS: NOT AT ALL
2. FEELING DOWN, DEPRESSED OR HOPELESS: NOT AT ALL

## 2024-08-08 NOTE — PATIENT INSTRUCTIONS
Thank you for visiting the Pulmonary Clinic today. If you have questions or concerns, please call the Pulmonary Department at [phone]. For clinic appointments, please call [phone].     DISCUSSION IN CLINIC:   We discussed how given lack of symptoms (no dyspnea, no coughing, no sputum production) that no further pulmonary testing is needed.  You had a sleep study done which showed some mild sleep apnea.  In the immediate postoperative period, you may require PAP therapy given higher risk of breathing difficulties.  We will make some recommendations to the surgeons regarding settings.    RETURN TO CLINIC: Only as needed    Keke Bell MD  Pulmonary and Critical Care Fellow  Div of Pulmonary, Critical Care and Sleep Medicine

## 2024-08-12 ENCOUNTER — ANESTHESIA EVENT (OUTPATIENT)
Dept: GASTROENTEROLOGY | Facility: HOSPITAL | Age: 39
End: 2024-08-12
Payer: COMMERCIAL

## 2024-08-13 ENCOUNTER — ANESTHESIA (OUTPATIENT)
Dept: GASTROENTEROLOGY | Facility: HOSPITAL | Age: 39
End: 2024-08-13
Payer: COMMERCIAL

## 2024-08-13 ENCOUNTER — HOSPITAL ENCOUNTER (OUTPATIENT)
Dept: GASTROENTEROLOGY | Facility: HOSPITAL | Age: 39
Setting detail: OUTPATIENT SURGERY
Discharge: HOME | End: 2024-08-13
Payer: COMMERCIAL

## 2024-08-13 VITALS
OXYGEN SATURATION: 100 % | DIASTOLIC BLOOD PRESSURE: 92 MMHG | RESPIRATION RATE: 16 BRPM | SYSTOLIC BLOOD PRESSURE: 141 MMHG | TEMPERATURE: 96.8 F | HEART RATE: 70 BPM

## 2024-08-13 DIAGNOSIS — Z98.84 BARIATRIC SURGERY STATUS: Primary | ICD-10-CM

## 2024-08-13 LAB — PREGNANCY TEST URINE, POC: NEGATIVE

## 2024-08-13 PROCEDURE — 3700000001 HC GENERAL ANESTHESIA TIME - INITIAL BASE CHARGE: Performed by: SURGERY

## 2024-08-13 PROCEDURE — 3700000002 HC GENERAL ANESTHESIA TIME - EACH INCREMENTAL 1 MINUTE: Performed by: SURGERY

## 2024-08-13 PROCEDURE — 81025 URINE PREGNANCY TEST: CPT | Performed by: STUDENT IN AN ORGANIZED HEALTH CARE EDUCATION/TRAINING PROGRAM

## 2024-08-13 PROCEDURE — 7100000010 HC PHASE TWO TIME - EACH INCREMENTAL 1 MINUTE: Performed by: SURGERY

## 2024-08-13 PROCEDURE — 2500000004 HC RX 250 GENERAL PHARMACY W/ HCPCS (ALT 636 FOR OP/ED): Mod: SE

## 2024-08-13 PROCEDURE — 2500000005 HC RX 250 GENERAL PHARMACY W/O HCPCS: Mod: SE

## 2024-08-13 PROCEDURE — 2500000004 HC RX 250 GENERAL PHARMACY W/ HCPCS (ALT 636 FOR OP/ED): Mod: SE | Performed by: STUDENT IN AN ORGANIZED HEALTH CARE EDUCATION/TRAINING PROGRAM

## 2024-08-13 PROCEDURE — 7100000009 HC PHASE TWO TIME - INITIAL BASE CHARGE: Performed by: SURGERY

## 2024-08-13 PROCEDURE — 43239 EGD BIOPSY SINGLE/MULTIPLE: CPT | Performed by: SURGERY

## 2024-08-13 RX ORDER — PROPOFOL 10 MG/ML
INJECTION, EMULSION INTRAVENOUS AS NEEDED
Status: DISCONTINUED | OUTPATIENT
Start: 2024-08-13 | End: 2024-08-13

## 2024-08-13 RX ORDER — MIDAZOLAM HYDROCHLORIDE 1 MG/ML
INJECTION INTRAMUSCULAR; INTRAVENOUS AS NEEDED
Status: DISCONTINUED | OUTPATIENT
Start: 2024-08-13 | End: 2024-08-13

## 2024-08-13 RX ORDER — FENTANYL CITRATE 50 UG/ML
INJECTION, SOLUTION INTRAMUSCULAR; INTRAVENOUS AS NEEDED
Status: DISCONTINUED | OUTPATIENT
Start: 2024-08-13 | End: 2024-08-13

## 2024-08-13 RX ORDER — SODIUM CHLORIDE, SODIUM LACTATE, POTASSIUM CHLORIDE, CALCIUM CHLORIDE 600; 310; 30; 20 MG/100ML; MG/100ML; MG/100ML; MG/100ML
20 INJECTION, SOLUTION INTRAVENOUS CONTINUOUS
Status: DISCONTINUED | OUTPATIENT
Start: 2024-08-13 | End: 2024-08-14 | Stop reason: HOSPADM

## 2024-08-13 RX ORDER — LIDOCAINE HYDROCHLORIDE 20 MG/ML
INJECTION, SOLUTION INFILTRATION; PERINEURAL AS NEEDED
Status: DISCONTINUED | OUTPATIENT
Start: 2024-08-13 | End: 2024-08-13

## 2024-08-13 RX ORDER — SODIUM CHLORIDE, SODIUM LACTATE, POTASSIUM CHLORIDE, CALCIUM CHLORIDE 600; 310; 30; 20 MG/100ML; MG/100ML; MG/100ML; MG/100ML
100 INJECTION, SOLUTION INTRAVENOUS CONTINUOUS
OUTPATIENT
Start: 2024-08-13

## 2024-08-13 RX ORDER — ONDANSETRON HYDROCHLORIDE 2 MG/ML
4 INJECTION, SOLUTION INTRAVENOUS ONCE AS NEEDED
OUTPATIENT
Start: 2024-08-13

## 2024-08-13 RX ORDER — ACETAMINOPHEN 325 MG/1
650 TABLET ORAL EVERY 4 HOURS PRN
OUTPATIENT
Start: 2024-08-13

## 2024-08-13 RX ORDER — LIDOCAINE HYDROCHLORIDE 10 MG/ML
0.1 INJECTION, SOLUTION EPIDURAL; INFILTRATION; INTRACAUDAL; PERINEURAL ONCE
OUTPATIENT
Start: 2024-08-13 | End: 2024-08-13

## 2024-08-13 ASSESSMENT — PAIN - FUNCTIONAL ASSESSMENT
PAIN_FUNCTIONAL_ASSESSMENT: 0-10

## 2024-08-13 ASSESSMENT — PAIN SCALES - GENERAL
PAINLEVEL_OUTOF10: 0 - NO PAIN

## 2024-08-13 NOTE — H&P
History Of Present Illness  Анна Bird is a 39 y.o. female presenting with Obesity, heartburn .     Past Medical History  Past Medical History:   Diagnosis Date    Abnormal chromosomal and genetic finding on  screening of mother 2016    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    Chondrocostal junction syndrome (tietze) 2015    Costochondritis    Encounter for  screening for Streptococcus B ([facility])      screening for streptococcus B    Encounter for routine checking of intrauterine contraceptive device 2016    Intrauterine device surveillance    Encounter for routine postpartum follow-up ([facility]) 2017    Normal postpartum course    Encounter for screening for diseases of the blood and blood-forming organs and certain disorders involving the immune mechanism 2015    Screening for deficiency anemia    Encounter for supervision of other normal pregnancy, unspecified trimester ([facility]) 2020    Prenatal care, subsequent pregnancy    Nocturia 2016    Nocturia    Obesity complicating pregnancy, unspecified trimester ([facility]) 2017    Obesity in pregnancy    Other specified abnormal immunological findings in serum 2015    Positive Helicobacter pylori serology    Other specified personal risk factors, not elsewhere classified     At risk of UTI    Personal history of other diseases of the musculoskeletal system and connective tissue 2015    History of chronic back pain    Personal history of other endocrine, nutritional and metabolic disease 10/29/2013    History of vitamin D deficiency    Personal history of other specified conditions 2014    History of insomnia    Personal history of other specified conditions 10/24/2013    History of urinary frequency    Personal history of other specified conditions 2016    History of dysuria    Personal history of other specified conditions 2018    History of  dysuria    Retention of urine, unspecified 2020    Incomplete emptying of bladder    Supervision of pregnancy with history of pre-term labor, unspecified trimester ([facility]) 2017    Previous  delivery, antepartum    Unspecified pre-existing hypertension complicating pregnancy, unspecified trimester ([facility]) 2017    Chronic hypertension in pregnancy    Urge incontinence     Sensory urge incontinence     Surgical History  Past Surgical History:   Procedure Laterality Date    CHOLECYSTECTOMY  2013    Cholecystectomy Laparoscopic     Social History  She reports that she has never smoked. She has never used smokeless tobacco. She reports current alcohol use of about 2.0 standard drinks of alcohol per week. She reports that she does not use drugs.    Family History  Family History   Problem Relation Name Age of Onset    Hypertension Mother Grandmother     Cancer Mother Grandmother     Diabetes Mother Grandmother     Hypertension Father Regulo     Arthritis Father Regulo     Cancer Paternal Grandmother Gabby         Allergies  Allergies   Allergen Reactions    Iodinated Contrast Media Unknown     Review of Systems   All other systems reviewed and are negative.       Physical Exam  Vitals and nursing note reviewed.   Constitutional:       Appearance: Normal appearance.   Eyes:      Conjunctiva/sclera: Conjunctivae normal.   Cardiovascular:      Rate and Rhythm: Normal rate and regular rhythm.   Pulmonary:      Effort: Pulmonary effort is normal.   Abdominal:      General: Abdomen is flat.      Palpations: Abdomen is soft.   Musculoskeletal:         General: Normal range of motion.   Skin:     General: Skin is warm.   Neurological:      General: No focal deficit present.      Mental Status: She is alert.   Psychiatric:         Mood and Affect: Mood normal.         Behavior: Behavior normal.          Last Recorded Vitals  Blood pressure 133/74, pulse 68, temperature 36 °C (96.8 °F), SpO2  100%.    Assessment/Plan   Pt here for screening EGD prior to bariatric procedure.     Will proceed with EGD      Lenin Burnett MD

## 2024-08-13 NOTE — ANESTHESIA PREPROCEDURE EVALUATION
Patient: Анна Bird    Procedure Information       Date/Time: 08/13/24 0930    Scheduled providers: Lenin Burnett MD    Procedure: EGD    Location: [facility]            Relevant Problems   Anesthesia (within normal limits)  No family hx MH      Cardiac   (+) Abnormal electrocardiogram (ECG) (EKG)   (+) Essential hypertension      Pulmonary (within normal limits)      Neuro   (+) Cervical radiculopathy   (+) Left lumbar radiculopathy   (+) Left sided sciatica      GI (within normal limits)      /Renal (within normal limits)      Liver (within normal limits)      Endocrine   (+) Morbid obesity (Multi)      Hematology   (+) Iron deficiency anemia      Musculoskeletal   (+) Chronic bilateral low back pain with bilateral sciatica   (+) Degenerative disc disease at L5-S1 level   (+) Spinal stenosis of lumbosacral region      HEENT (within normal limits)      ID (within normal limits)      Skin (within normal limits)      GYN (within normal limits)      Digestive   (+) Bariatric surgery status     39 year old F with PMH HTN, GERD, and obesity who presents prior to a planned sleeve gastrectomy to ensure pulmonary optimization.  She was seen this summer by sleep medicine, had a sleep study done which showed mild BARBARA, was seen by sleep medicine who indicated patient may benefit from PAP in the postoperative setting.   Clinical information reviewed:                 ECHO 5/2024  CONCLUSIONS:   1. Poorly visualized anatomical structures due to suboptimal image quality.   2. Left ventricular ejection fraction is normal, calculated by Khanna's biplane at 69%.   3. RVSP within normal limits.  NPO Detail:  No data recorded     Physical Exam    Airway  Mallampati: II  TM distance: >3 FB     Cardiovascular - normal exam     Dental   Comments: intact   Pulmonary - normal exam     Abdominal          Vitals:    08/13/24 0918   BP: 133/74   Pulse: 68   Temp: 36 °C (96.8 °F)   SpO2: 100%       Past Surgical  History:   Procedure Laterality Date    CHOLECYSTECTOMY  2013    Cholecystectomy Laparoscopic     Past Medical History:   Diagnosis Date    Abnormal chromosomal and genetic finding on  screening of mother 2016    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    Chondrocostal junction syndrome (tietze) 2015    Costochondritis    Encounter for  screening for Streptococcus B ([facility])      screening for streptococcus B    Encounter for routine checking of intrauterine contraceptive device 2016    Intrauterine device surveillance    Encounter for routine postpartum follow-up ([facility]) 2017    Normal postpartum course    Encounter for screening for diseases of the blood and blood-forming organs and certain disorders involving the immune mechanism 2015    Screening for deficiency anemia    Encounter for supervision of other normal pregnancy, unspecified trimester ([facility]) 2020    Prenatal care, subsequent pregnancy    Nocturia 2016    Nocturia    Obesity complicating pregnancy, unspecified trimester ([facility]) 2017    Obesity in pregnancy    Other specified abnormal immunological findings in serum 2015    Positive Helicobacter pylori serology    Other specified personal risk factors, not elsewhere classified     At risk of UTI    Personal history of other diseases of the musculoskeletal system and connective tissue 2015    History of chronic back pain    Personal history of other endocrine, nutritional and metabolic disease 10/29/2013    History of vitamin D deficiency    Personal history of other specified conditions 2014    History of insomnia    Personal history of other specified conditions 10/24/2013    History of urinary frequency    Personal history of other specified conditions 2016    History of dysuria    Personal history of other specified conditions 2018    History of dysuria    Retention of  urine, unspecified 2020    Incomplete emptying of bladder    Supervision of pregnancy with history of pre-term labor, unspecified trimester ([facility]) 2017    Previous  delivery, antepartum    Unspecified pre-existing hypertension complicating pregnancy, unspecified trimester ([facility]) 2017    Chronic hypertension in pregnancy    Urge incontinence     Sensory urge incontinence       Current Outpatient Medications:     amLODIPine (Norvasc) 5 mg tablet, Take 1 tablet (5 mg) by mouth once daily., Disp: 90 tablet, Rfl: 3    chlorthalidone (Hygroton) 25 mg tablet, Take 1 tablet (25 mg) by mouth once daily., Disp: 90 tablet, Rfl: 3    cyclobenzaprine (Flexeril) 10 mg tablet, Take 1 tablet (10 mg) by mouth 3 times a day as needed for muscle spasms., Disp: 90 tablet, Rfl: 11    ferrous sulfate 325 (65 Fe) MG tablet, Take 1 tablet by mouth every other day. Without food to increase absorption, Disp: , Rfl:     diclofenac sodium 1 % kit, Apply topically once daily. Apply sparingly to affected area(s), Disp: , Rfl:     DULoxetine (Cymbalta) 30 mg DR capsule, Take once capsule for 7 days then take 2 capsules daily. (Patient not taking: Reported on 2024), Disp: 60 capsule, Rfl: 3    omeprazole (PriLOSEC) 20 mg DR capsule, Take 1 capsule (20 mg) by mouth once daily with breakfast., Disp: , Rfl:   Prior to Admission medications    Medication Sig Start Date End Date Taking? Authorizing Provider   amLODIPine (Norvasc) 5 mg tablet Take 1 tablet (5 mg) by mouth once daily. 24 Yes Pedro Pablo Barron MD   chlorthalidone (Hygroton) 25 mg tablet Take 1 tablet (25 mg) by mouth once daily. 24 Yes Luciana Henriquez MD   cyclobenzaprine (Flexeril) 10 mg tablet Take 1 tablet (10 mg) by mouth 3 times a day as needed for muscle spasms. 3/15/24  Yes Luciana Henriquez MD   ferrous sulfate 325 (65 Fe) MG tablet Take 1 tablet by mouth every other day. Without food to increase absorption 22  Yes  "Historical Provider, MD   diclofenac sodium 1 % kit Apply topically once daily. Apply sparingly to affected area(s) 2/24/22   Historical Provider, MD   DULoxetine (Cymbalta) 30 mg DR capsule Take once capsule for 7 days then take 2 capsules daily.  Patient not taking: Reported on 8/8/2024 3/15/24   Luciana Henriquez MD   omeprazole (PriLOSEC) 20 mg DR capsule Take 1 capsule (20 mg) by mouth once daily with breakfast. 7/11/22   Historical Provider, MD     Allergies   Allergen Reactions    Iodinated Contrast Media Unknown     Social History     Tobacco Use    Smoking status: Never    Smokeless tobacco: Never   Substance Use Topics    Alcohol use: Yes     Alcohol/week: 2.0 standard drinks of alcohol     Types: 2 Glasses of wine per week     Comment: casually         Chemistry    Lab Results   Component Value Date/Time     11/02/2022 1937    K 3.1 (L) 11/02/2022 1937    CL 99 11/02/2022 1937    CO2 30 11/02/2022 1937    BUN 10 11/02/2022 1937    CREATININE 0.70 11/02/2022 1937    Lab Results   Component Value Date/Time    CALCIUM 9.3 11/02/2022 1937    ALKPHOS 68 11/02/2022 1937    AST 12 11/02/2022 1937    ALT 10 11/02/2022 1937    BILITOT 0.4 11/02/2022 1937          Lab Results   Component Value Date/Time    WBC 11.2 11/02/2022 1937    HGB 10.9 (L) 11/02/2022 1937    HCT 35.8 (L) 11/02/2022 1937     (H) 11/02/2022 1937     No results found for: \"PROTIME\", \"PTT\", \"INR\"  Encounter Date: 05/22/24   ECG 12 lead (Clinic Performed)   Result Value    Ventricular Rate 74    Atrial Rate 74    KS Interval 158    QRS Duration 76    QT Interval 396    QTC Calculation(Bazett) 439    P Axis 41    R Axis 19    T Axis 20    QRS Count 12    Q Onset 218    P Onset 139    P Offset 195    T Offset 416    QTC Fredericia 425    Narrative    Normal sinus rhythm  Normal ECG  When compared with ECG of 30-APR-2013 10:41,  No significant change was found  Confirmed by Alfie Estes ([id number]) on 6/10/2024 10:08:16 PM     No results " found for this or any previous visit from the past 1095 days.     Anesthesia Plan    History of general anesthesia?: yes  History of complications of general anesthesia?: no    ASA 3     MAC     intravenous induction   Anesthetic plan and risks discussed with patient.  Use of blood products discussed with patient who consented to blood products.    Plan discussed with CAA.

## 2024-08-13 NOTE — ANESTHESIA POSTPROCEDURE EVALUATION
Patient: Анна Bird    Procedure Summary       Date: 08/13/24 Room / Location: [facility]    Anesthesia Start: 1106 Anesthesia Stop: 1128    Procedure: EGD Diagnosis:       Bariatric surgery status      Bariatric surgery status    Scheduled Providers: Lenin Burnett MD Responsible Provider: Nimco Escobar MD    Anesthesia Type: MAC ASA Status: 3            Anesthesia Type: MAC    Vitals Value Taken Time   /75 08/13/24 1128   Temp 36 °C (96.8 °F) 08/13/24 1128   Pulse 86 08/13/24 1128   Resp 16 08/13/24 1128   SpO2 97 % 08/13/24 1128       Anesthesia Post Evaluation    Patient location during evaluation: PACU  Patient participation: complete - patient participated  Level of consciousness: awake and alert  Pain management: adequate  Airway patency: patent  Cardiovascular status: acceptable  Respiratory status: acceptable  Hydration status: acceptable  Postoperative Nausea and Vomiting: none        There were no known notable events for this encounter.

## 2024-08-14 ASSESSMENT — PAIN SCALES - GENERAL: PAINLEVEL_OUTOF10: 3

## 2024-08-14 NOTE — SIGNIFICANT EVENT
Pt states she is feeling slightly nauseated but hasn't eaten anything. Instructed to try and eat something light/small maybe some saltines. Suggested she may be nauseated form anesthesia but also not eating. She also said she is having some stomach pain. Advised to give it some time and try to eat a little and see if that helps and take some tylenol after eating something. If worsens to call back to our dept or Dr. Burnett's office or go to ER.

## 2024-08-15 ENCOUNTER — APPOINTMENT (OUTPATIENT)
Dept: PULMONOLOGY | Facility: HOSPITAL | Age: 39
End: 2024-08-15
Payer: COMMERCIAL

## 2024-08-20 LAB
LABORATORY COMMENT REPORT: NORMAL
PATH REPORT.FINAL DX SPEC: NORMAL
PATH REPORT.GROSS SPEC: NORMAL
PATH REPORT.TOTAL CANCER: NORMAL

## 2024-08-21 ENCOUNTER — APPOINTMENT (OUTPATIENT)
Dept: SURGERY | Facility: CLINIC | Age: 39
End: 2024-08-21
Payer: COMMERCIAL

## 2024-08-21 VITALS — BODY MASS INDEX: 41.82 KG/M2 | WEIGHT: 251 LBS | HEIGHT: 65 IN

## 2024-08-21 NOTE — PROGRESS NOTES
"PREOPERATIVE, MULTIDISCIPLINARY, MEDICALLY SUPERVISED, REDUCED CALORIE DIET, BEHAVIOR MODIFICATION AND EXERCISE PROGRAM    S:  The pt continues to have 3 meals a day and focusing on protein at each meal. B: Premier shake, L: tuna/turkey or salad w/chicken or tuna, D: meat and a veggie or protein shake, S: fruit or nuts or pretzel and PB or cheese stick or greek yogurt. Continues to practice post op behaviors. Continues to drink water and meeting her goals. Sometimes drinks unsweet tea. Continues to take a MVI.      O:    Wt: 251lbs    Ht: 1.65 m (5' 4.96\")         Body mass index is 41.82 kg/m².      Goal: 5% body weight loss over the course of program    Dietary recommendation:   1. Continue to drink 64oz of noncarbonated/caffeine free/sugar free beverages   2. Practice the 30-30-30 rule by drinking between meals.  3. Structure your meal plan - have 3 meals and 1 snack daily.  4. Have balanced meals that always contain a good source of protein.  5. Increase intake of non-starchy vegetables.  Have 5 servings fruits and vegetables daily.   6. Take a multivitamin daily.  7. Continue walking and going up and down your stairs. Increase physical activity by 10-15 minutes to an end goal of 60 minutes 5 x per week.    Group Topic: Meal Planning    Behavioral recommendation: Pt will plan daily meals and snacks in advance, balance components to build healthy meals, and take time to listen to the body's signs of hunger and satiety.     A/P: Pt appears to have a good understanding of how to build a healthy meal schedule and how to assemble a healthy meal.  Pt has a goal to eat 3 meals and 1 snack at regularly scheduled meal times, make balanced food choices, and to listen to the body's cues to prevent sensations of being both starving and over-stuffed. The pt continues to do well and meet her goals. Will call to review the 2 week pre op diet.     Exercise: 15 stairs up/down 3 times/day, walking 4x per week for 30 minutes "     Shahana Palomino, MIRAN, LD

## 2024-09-05 ENCOUNTER — TELEMEDICINE CLINICAL SUPPORT (OUTPATIENT)
Dept: SURGERY | Facility: CLINIC | Age: 39
End: 2024-09-05
Payer: COMMERCIAL

## 2024-09-05 DIAGNOSIS — Z98.84 BARIATRIC SURGERY STATUS: Primary | ICD-10-CM

## 2024-09-10 ENCOUNTER — APPOINTMENT (OUTPATIENT)
Dept: ENDOCRINOLOGY | Facility: CLINIC | Age: 39
End: 2024-09-10
Payer: COMMERCIAL

## 2024-09-13 ENCOUNTER — APPOINTMENT (OUTPATIENT)
Dept: PRIMARY CARE | Facility: CLINIC | Age: 39
End: 2024-09-13
Payer: COMMERCIAL

## 2024-09-13 VITALS
SYSTOLIC BLOOD PRESSURE: 139 MMHG | DIASTOLIC BLOOD PRESSURE: 91 MMHG | WEIGHT: 253.8 LBS | BODY MASS INDEX: 42.28 KG/M2 | OXYGEN SATURATION: 99 % | HEIGHT: 65 IN | HEART RATE: 76 BPM | TEMPERATURE: 97.3 F

## 2024-09-13 DIAGNOSIS — I10 ESSENTIAL HYPERTENSION: ICD-10-CM

## 2024-09-13 DIAGNOSIS — M48.07 SPINAL STENOSIS OF LUMBOSACRAL REGION: ICD-10-CM

## 2024-09-13 PROCEDURE — 3075F SYST BP GE 130 - 139MM HG: CPT

## 2024-09-13 PROCEDURE — 99213 OFFICE O/P EST LOW 20 MIN: CPT

## 2024-09-13 PROCEDURE — 3080F DIAST BP >= 90 MM HG: CPT

## 2024-09-13 PROCEDURE — 3008F BODY MASS INDEX DOCD: CPT

## 2024-09-13 RX ORDER — AMLODIPINE BESYLATE 5 MG/1
5 TABLET ORAL DAILY
Qty: 90 TABLET | Refills: 3 | Status: SHIPPED | OUTPATIENT
Start: 2024-09-13 | End: 2025-09-13

## 2024-09-13 RX ORDER — CYCLOBENZAPRINE HCL 10 MG
10 TABLET ORAL NIGHTLY
Qty: 90 TABLET | Refills: 11 | Status: SHIPPED | OUTPATIENT
Start: 2024-09-13

## 2024-09-13 RX ORDER — DULOXETIN HYDROCHLORIDE 30 MG/1
CAPSULE, DELAYED RELEASE ORAL
Qty: 60 CAPSULE | Refills: 3 | Status: SHIPPED | OUTPATIENT
Start: 2024-09-13

## 2024-09-13 RX ORDER — CHLORTHALIDONE 25 MG/1
25 TABLET ORAL DAILY
Qty: 90 TABLET | Refills: 3 | Status: SHIPPED | OUTPATIENT
Start: 2024-09-13 | End: 2025-09-13

## 2024-09-13 RX ORDER — LIDOCAINE 50 MG/G
1 PATCH TOPICAL DAILY
Qty: 5 PATCH | Refills: 2 | Status: SHIPPED | OUTPATIENT
Start: 2024-09-13 | End: 2025-09-13

## 2024-09-13 ASSESSMENT — ENCOUNTER SYMPTOMS
OCCASIONAL FEELINGS OF UNSTEADINESS: 0
DEPRESSION: 0
LOSS OF SENSATION IN FEET: 1

## 2024-09-13 ASSESSMENT — COLUMBIA-SUICIDE SEVERITY RATING SCALE - C-SSRS
6. HAVE YOU EVER DONE ANYTHING, STARTED TO DO ANYTHING, OR PREPARED TO DO ANYTHING TO END YOUR LIFE?: NO
2. HAVE YOU ACTUALLY HAD ANY THOUGHTS OF KILLING YOURSELF?: NO
1. IN THE PAST MONTH, HAVE YOU WISHED YOU WERE DEAD OR WISHED YOU COULD GO TO SLEEP AND NOT WAKE UP?: NO

## 2024-09-13 ASSESSMENT — PAIN SCALES - GENERAL: PAINLEVEL: 0-NO PAIN

## 2024-09-13 NOTE — PROGRESS NOTES
"Subjective   Patient ID: Анна Bird is a 39 y.o. female who presents for Establish Care (Pt needs weight loss paper. ) and Med Refill.    HPI     #Establish care  - Prior PCP Dr. Henriquez who finished her residency at [facility] clinic  - History: Obesity, HTN, sciatica, migrains  - Allergies: Iodinated Contrast  - FHx: Maternal: HTN  Paternal: HTN, cancer (unsure)    Concerns:   #Weight Loss Surgery Paper work  -patient is following with Bariatric Surgery and working towards getting weight loss surgery    #Medication Refills      Review of Systems  10 pt reviewed negative    Objective   BP (!) 139/91 (BP Location: Left arm, Patient Position: Sitting, BP Cuff Size: Large adult)   Pulse 76   Temp 36.3 °C (97.3 °F) (Temporal)   Ht 1.651 m (5' 5\")   Wt 115 kg (253 lb 12.8 oz)   SpO2 99%   BMI 42.23 kg/m²     Physical Exam  Constitutional:       General: She is not in acute distress.  HENT:      Head: Normocephalic and atraumatic.      Nose: Nose normal.   Eyes:      Conjunctiva/sclera: Conjunctivae normal.   Cardiovascular:      Rate and Rhythm: Normal rate and regular rhythm.      Heart sounds: No murmur heard.  Pulmonary:      Effort: Pulmonary effort is normal.      Breath sounds: No wheezing or rales.   Musculoskeletal:         General: Normal range of motion.      Right lower leg: No edema.      Left lower leg: No edema.   Skin:     Capillary Refill: Capillary refill takes 2 to 3 seconds.   Neurological:      General: No focal deficit present.      Mental Status: She is alert and oriented to person, place, and time.   Psychiatric:         Mood and Affect: Mood normal.         Behavior: Behavior normal.         Assessment/Plan   Анна Bird is a 39 y.o. female with PMHx of HTN, spinal stenosis, class III obesity presenting in clinic to establish care with new PCP, get medications refills, and have weight loss surgery form completed which is planned for end of September. Otherwise patient doing well. " Plan to follow up post bariatric surgery.   Diagnoses and all orders for this visit:  Essential hypertension  -     /91  -     amLODIPine (Norvasc) 5 mg tablet; Take 1 tablet (5 mg) by mouth once daily.  -     chlorthalidone (Hygroton) 25 mg tablet; Take 1 tablet (25 mg) by mouth once daily.  Spinal stenosis of lumbosacral region  Comments:  Chronic, with sciatica. Failed NSAIDs and topamax. Per pt reports, did not try duloxetine. Provided Refill on duloxetine & flexeril. Declined PT at this time.   Orders:  -     cyclobenzaprine (Flexeril) 10 mg tablet; Take 1 tablet (10 mg) by mouth once daily at bedtime.  -     lidocaine (Lidoderm) 5 % patch; Place 1 patch over 12 hours on the skin once daily. Apply to painful area 12 hours per day, remove for 12 hours.  -     DULoxetine (Cymbalta) 30 mg DR capsule; Take once capsule for 7 days then take 2 capsules daily.     RTC in 3 months    Discussed with Dr. Cipriano Charles,  FM PGY3

## 2024-09-16 ENCOUNTER — DOCUMENTATION (OUTPATIENT)
Dept: SURGERY | Facility: HOSPITAL | Age: 39
End: 2024-09-16
Payer: COMMERCIAL

## 2024-09-18 ENCOUNTER — DOCUMENTATION (OUTPATIENT)
Dept: SURGERY | Facility: HOSPITAL | Age: 39
End: 2024-09-18
Payer: COMMERCIAL

## 2024-09-20 NOTE — PROGRESS NOTES
I reviewed the resident/fellow's documentation and discussed the patient with the resident/fellow. I agree with the resident/fellow's medical decision making as documented in the note.    Naomie Cota MD

## 2024-09-23 ENCOUNTER — APPOINTMENT (OUTPATIENT)
Dept: LAB | Facility: LAB | Age: 39
End: 2024-09-23
Payer: COMMERCIAL

## 2024-09-23 ENCOUNTER — NUTRITION (OUTPATIENT)
Dept: SURGERY | Facility: HOSPITAL | Age: 39
End: 2024-09-23
Payer: COMMERCIAL

## 2024-09-23 DIAGNOSIS — E66.01 OBESITY, CLASS III, BMI 40-49.9 (MORBID OBESITY) (MULTI): ICD-10-CM

## 2024-09-23 DIAGNOSIS — Z98.84 BARIATRIC SURGERY STATUS: Primary | ICD-10-CM

## 2024-09-23 NOTE — PROGRESS NOTES
Bariatric Surgery Program - [facility]    PREOPERATIVE, MULTIDISCIPLINARY, MEDICALLY SUPERVISED, REDUCED CALORIE DIET, BEHAVIOR MODIFICATION AND EXERCISE PROGRAM       S: IW: 268#, LV: 251#  Patient reports she has been continuing to follow her meal plan. Eating regular meals, 3 times/day, limiting snacks. Reports drinknig water and practicing her postop behaviors. Patient reports she has maintained her exercise.     Diet recall:  B - oatmeal  L - tuna, crackers   D - baked potato and chicken  Snacks - fruits/vegetables   Fluids - water    Exercise: walking, 15 steps x3 3 intervals/day    O: Wt: 253 lb   Ht:  65 in   BMI: 42.2    Goal: 5% body weight loss over the course of program    Dietary recommendation:   1. Continue to drink 64oz water daily.  2. Practice the 30-30-30 rule by drinking between meals.  3. Continue to eat 3 meals and 1 snack daily.  4. Have protein rich foods at each meal, aim for 3-4 ounces (20-30 grams) per meal. Have a protein drink as needed if your appetite is low.   5. Continue to build on to your exercise routine.  6. Start your Preop Diet 2 weeks before surgery        A/P: Pt is consistent with following her meal plan and meeting exercise and fluid goals. Patient will continue with her regimen and no further follow ups scheduled at this time. RD to contact patient once she has surgery date.

## 2024-09-25 ENCOUNTER — DOCUMENTATION (OUTPATIENT)
Dept: SURGERY | Facility: HOSPITAL | Age: 39
End: 2024-09-25
Payer: COMMERCIAL

## 2024-09-26 ENCOUNTER — TELEPHONE (OUTPATIENT)
Dept: SURGERY | Facility: HOSPITAL | Age: 39
End: 2024-09-26
Payer: COMMERCIAL

## 2024-09-26 NOTE — TELEPHONE ENCOUNTER
Telephone call to patient. Last requirement pending on bariatric list is completion of lab work. Reviewed this with patient. She verbalized understanding and states she will have this completed end of this week or early next week. Did let her know that some labs may take up to a week to result.

## 2024-09-27 ENCOUNTER — LAB (OUTPATIENT)
Dept: LAB | Facility: LAB | Age: 39
End: 2024-09-27
Payer: COMMERCIAL

## 2024-09-27 DIAGNOSIS — Z01.818 PREOPERATIVE CLEARANCE: ICD-10-CM

## 2024-09-27 DIAGNOSIS — Z98.84 BARIATRIC SURGERY STATUS: ICD-10-CM

## 2024-09-27 DIAGNOSIS — E66.01 MORBID OBESITY (MULTI): ICD-10-CM

## 2024-09-27 LAB
25(OH)D3 SERPL-MCNC: 22 NG/ML (ref 30–100)
ALBUMIN SERPL BCP-MCNC: 4.3 G/DL (ref 3.4–5)
ALP SERPL-CCNC: 65 U/L (ref 33–110)
ALT SERPL W P-5'-P-CCNC: 8 U/L (ref 7–45)
AMPHETAMINES UR QL SCN: NORMAL
ANION GAP SERPL CALC-SCNC: 15 MMOL/L (ref 10–20)
APTT PPP: 37 SECONDS (ref 27–38)
AST SERPL W P-5'-P-CCNC: 10 U/L (ref 9–39)
BARBITURATES UR QL SCN: NORMAL
BASOPHILS # BLD AUTO: 0.04 X10*3/UL (ref 0–0.1)
BASOPHILS NFR BLD AUTO: 0.5 %
BENZODIAZ UR QL SCN: NORMAL
BILIRUB SERPL-MCNC: 0.4 MG/DL (ref 0–1.2)
BUN SERPL-MCNC: 15 MG/DL (ref 6–23)
BZE UR QL SCN: NORMAL
CALCIUM SERPL-MCNC: 9.6 MG/DL (ref 8.6–10.6)
CANNABINOIDS UR QL SCN: NORMAL
CHLORIDE SERPL-SCNC: 104 MMOL/L (ref 98–107)
CHOLEST SERPL-MCNC: 178 MG/DL (ref 0–199)
CHOLESTEROL/HDL RATIO: 3.7
CO2 SERPL-SCNC: 24 MMOL/L (ref 21–32)
CREAT SERPL-MCNC: 0.7 MG/DL (ref 0.5–1.05)
EGFRCR SERPLBLD CKD-EPI 2021: >90 ML/MIN/1.73M*2
EOSINOPHIL # BLD AUTO: 0.25 X10*3/UL (ref 0–0.7)
EOSINOPHIL NFR BLD AUTO: 2.8 %
ERYTHROCYTE [DISTWIDTH] IN BLOOD BY AUTOMATED COUNT: 18.7 % (ref 11.5–14.5)
EST. AVERAGE GLUCOSE BLD GHB EST-MCNC: 105 MG/DL
FENTANYL+NORFENTANYL UR QL SCN: NORMAL
FERRITIN SERPL-MCNC: 17 NG/ML (ref 8–150)
FOLATE SERPL-MCNC: 13.3 NG/ML
GLUCOSE SERPL-MCNC: 84 MG/DL (ref 74–99)
HBA1C MFR BLD: 5.3 %
HCT VFR BLD AUTO: 32.5 % (ref 36–46)
HDLC SERPL-MCNC: 47.6 MG/DL
HGB BLD-MCNC: 9.1 G/DL (ref 12–16)
IMM GRANULOCYTES # BLD AUTO: 0.05 X10*3/UL (ref 0–0.7)
IMM GRANULOCYTES NFR BLD AUTO: 0.6 % (ref 0–0.9)
INR PPP: 1.1 (ref 0.9–1.1)
IRON SATN MFR SERPL: 3 % (ref 25–45)
IRON SERPL-MCNC: 14 UG/DL (ref 35–150)
LDLC SERPL CALC-MCNC: 119 MG/DL
LYMPHOCYTES # BLD AUTO: 2.76 X10*3/UL (ref 1.2–4.8)
LYMPHOCYTES NFR BLD AUTO: 31.1 %
MCH RBC QN AUTO: 20.4 PG (ref 26–34)
MCHC RBC AUTO-ENTMCNC: 28 G/DL (ref 32–36)
MCV RBC AUTO: 73 FL (ref 80–100)
METHADONE UR QL SCN: NORMAL
MONOCYTES # BLD AUTO: 0.48 X10*3/UL (ref 0.1–1)
MONOCYTES NFR BLD AUTO: 5.4 %
NEUTROPHILS # BLD AUTO: 5.29 X10*3/UL (ref 1.2–7.7)
NEUTROPHILS NFR BLD AUTO: 59.6 %
NON HDL CHOLESTEROL: 130 MG/DL (ref 0–149)
NRBC BLD-RTO: 0 /100 WBCS (ref 0–0)
OPIATES UR QL SCN: NORMAL
OXYCODONE+OXYMORPHONE UR QL SCN: NORMAL
PCP UR QL SCN: NORMAL
PLATELET # BLD AUTO: 496 X10*3/UL (ref 150–450)
POTASSIUM SERPL-SCNC: 4 MMOL/L (ref 3.5–5.3)
PROT SERPL-MCNC: 7.5 G/DL (ref 6.4–8.2)
PROTHROMBIN TIME: 12.6 SECONDS (ref 9.8–12.8)
PTH-INTACT SERPL-MCNC: 75.3 PG/ML (ref 18.5–88)
RBC # BLD AUTO: 4.47 X10*6/UL (ref 4–5.2)
SODIUM SERPL-SCNC: 139 MMOL/L (ref 136–145)
T4 FREE SERPL-MCNC: 1.27 NG/DL (ref 0.78–1.48)
TIBC SERPL-MCNC: 436 UG/DL (ref 240–445)
TRIGL SERPL-MCNC: 59 MG/DL (ref 0–149)
TSH SERPL-ACNC: 1.32 MIU/L (ref 0.44–3.98)
UIBC SERPL-MCNC: 422 UG/DL (ref 110–370)
VIT B12 SERPL-MCNC: 697 PG/ML (ref 211–911)
VLDL: 12 MG/DL (ref 0–40)
WBC # BLD AUTO: 8.9 X10*3/UL (ref 4.4–11.3)

## 2024-09-27 PROCEDURE — 82746 ASSAY OF FOLIC ACID SERUM: CPT

## 2024-09-27 PROCEDURE — 83036 HEMOGLOBIN GLYCOSYLATED A1C: CPT

## 2024-09-27 PROCEDURE — 80053 COMPREHEN METABOLIC PANEL: CPT

## 2024-09-27 PROCEDURE — 85025 COMPLETE CBC W/AUTO DIFF WBC: CPT

## 2024-09-27 PROCEDURE — 80061 LIPID PANEL: CPT

## 2024-09-27 PROCEDURE — 84439 ASSAY OF FREE THYROXINE: CPT

## 2024-09-27 PROCEDURE — 80307 DRUG TEST PRSMV CHEM ANLYZR: CPT

## 2024-09-27 PROCEDURE — 82607 VITAMIN B-12: CPT

## 2024-09-27 PROCEDURE — 84630 ASSAY OF ZINC: CPT

## 2024-09-27 PROCEDURE — 36415 COLL VENOUS BLD VENIPUNCTURE: CPT

## 2024-09-27 PROCEDURE — 82525 ASSAY OF COPPER: CPT

## 2024-09-27 PROCEDURE — 85610 PROTHROMBIN TIME: CPT

## 2024-09-27 PROCEDURE — 84443 ASSAY THYROID STIM HORMONE: CPT

## 2024-09-27 PROCEDURE — 83550 IRON BINDING TEST: CPT

## 2024-09-27 PROCEDURE — 82306 VITAMIN D 25 HYDROXY: CPT

## 2024-09-27 PROCEDURE — 80323 ALKALOIDS NOS: CPT

## 2024-09-27 PROCEDURE — 85730 THROMBOPLASTIN TIME PARTIAL: CPT

## 2024-09-27 PROCEDURE — 83970 ASSAY OF PARATHORMONE: CPT

## 2024-09-27 PROCEDURE — 83540 ASSAY OF IRON: CPT

## 2024-09-27 PROCEDURE — 84425 ASSAY OF VITAMIN B-1: CPT

## 2024-09-27 PROCEDURE — 82728 ASSAY OF FERRITIN: CPT

## 2024-09-29 LAB
COPPER SERPL-MCNC: 158.4 UG/DL (ref 80–155)
ZINC SERPL-MCNC: 76.9 UG/DL (ref 60–120)

## 2024-09-30 ENCOUNTER — DOCUMENTATION (OUTPATIENT)
Dept: SURGERY | Facility: HOSPITAL | Age: 39
End: 2024-09-30
Payer: COMMERCIAL

## 2024-09-30 DIAGNOSIS — E61.1 LOW IRON: ICD-10-CM

## 2024-09-30 DIAGNOSIS — Z98.84 BARIATRIC SURGERY STATUS: ICD-10-CM

## 2024-09-30 NOTE — PROGRESS NOTES
Pt sent me a SafeAwake message. Pt's nicotine has not resulted yet. Pt was advised as well as RICHA Mendoza ,

## 2024-10-02 LAB — VIT B1 PYROPHOSHATE BLD-SCNC: 85 NMOL/L (ref 70–180)

## 2024-10-03 LAB
COTININE SERPL-MCNC: <5 NG/ML
NICOTINE SERPL-MCNC: <5 NG/ML

## 2024-10-07 ENCOUNTER — DOCUMENTATION (OUTPATIENT)
Dept: SURGERY | Facility: HOSPITAL | Age: 39
End: 2024-10-07
Payer: COMMERCIAL

## 2024-10-07 NOTE — PROGRESS NOTES
Inc call from [facility] with [facility] to confirm date of surgery.
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
## 2024-10-17 ENCOUNTER — DOCUMENTATION (OUTPATIENT)
Dept: SURGERY | Facility: HOSPITAL | Age: 39
End: 2024-10-17
Payer: COMMERCIAL

## 2024-10-21 ENCOUNTER — DOCUMENTATION (OUTPATIENT)
Dept: SURGERY | Facility: HOSPITAL | Age: 39
End: 2024-10-21
Payer: COMMERCIAL

## 2024-10-28 DIAGNOSIS — Z98.84 BARIATRIC SURGERY STATUS: Primary | ICD-10-CM

## 2024-10-28 DIAGNOSIS — E66.01 MORBID OBESITY DUE TO EXCESS CALORIES (MULTI): ICD-10-CM

## 2024-10-28 DIAGNOSIS — Z01.818 PREOPERATIVE CLEARANCE: ICD-10-CM

## 2024-10-29 DIAGNOSIS — E66.01 MORBID OBESITY DUE TO EXCESS CALORIES (MULTI): ICD-10-CM

## 2024-10-30 ENCOUNTER — DOCUMENTATION (OUTPATIENT)
Dept: SURGERY | Facility: HOSPITAL | Age: 39
End: 2024-10-30
Payer: COMMERCIAL

## 2024-10-30 ENCOUNTER — HOSPITAL ENCOUNTER (OUTPATIENT)
Facility: HOSPITAL | Age: 39
Setting detail: SURGERY ADMIT
End: 2024-10-30
Attending: SURGERY | Admitting: SURGERY
Payer: COMMERCIAL

## 2024-10-30 PROBLEM — E66.01 MORBID OBESITY DUE TO EXCESS CALORIES (MULTI): Status: ACTIVE | Noted: 2024-10-29

## 2024-11-07 ENCOUNTER — DOCUMENTATION (OUTPATIENT)
Dept: SURGERY | Facility: HOSPITAL | Age: 39
End: 2024-11-07
Payer: COMMERCIAL

## 2024-12-06 ENCOUNTER — TELEPHONE (OUTPATIENT)
Dept: SURGERY | Facility: HOSPITAL | Age: 39
End: 2024-12-06
Payer: COMMERCIAL

## 2024-12-06 NOTE — TELEPHONE ENCOUNTER
Telephone call to patient regarding surgery scheduled in Jan. Unable to reach at this time. Left VM requesting return call to clinic.

## 2024-12-19 ENCOUNTER — DOCUMENTATION (OUTPATIENT)
Dept: SURGERY | Facility: CLINIC | Age: 39
End: 2024-12-19
Payer: COMMERCIAL

## 2024-12-31 ENCOUNTER — DOCUMENTATION (OUTPATIENT)
Facility: HOSPITAL | Age: 39
End: 2024-12-31
Payer: COMMERCIAL

## 2024-12-31 NOTE — PROGRESS NOTES
Telephone message asking to speak with her PCP: (I am on Dr. Charles's team and she is not in the office)  She switched medical insurance from [employer] to [employer] as of December 2024.  She is wondering if all the preparations she's done to undergo bariatric surgery will still allow her to afford the procedure tentatively scheduled for March 24.  I see documentation that Mohsenuriellaurenbeatriz is also working on insurance preapproval.  I suggested she should contact both the bariatric surgery dept. And her insurance company to see what her out of pocket costs would be and to discuss her options.      She wonders if GLP1 agonist would be a feasible alternative if surgery is out of the question.  I suggested she could consult with an endocrinologist, the bariatric team, or [facility] clinic regarding weight loss medications.  She does not have diabetes but does have hypertension.  I mentioned that [employer] insurance coverage for weight loss GLP1a druggs is limited but that some lower-cost options may be acceptable.

## 2025-01-08 ENCOUNTER — TELEPHONE (OUTPATIENT)
Dept: SURGERY | Facility: HOSPITAL | Age: 40
End: 2025-01-08
Payer: COMMERCIAL

## 2025-01-08 NOTE — TELEPHONE ENCOUNTER
Telephone call to patient. New insurance verification completed and surgery must be performed at an [facility] site. Message sent to [facility] team regarding potential transfer. Did let her know that her surgery and related appointments will be cancelled at [facility]. She verbalized understanding.

## 2025-01-10 NOTE — PROGRESS NOTES
Initial Medical Weight Loss Appointment (MWL 1)      Starting Weight: 253 lbs   Current BMI: 44.11      The following nutrition assessment questionnaire was provided and filled out by the patient. The questions and associated pt answers are as documented:       What diets have you tried in the past?  None    Which diet were you the most successful with?   N/A   What was your highest adult weight? (Please list date/timeframe)  280 lbs a year ago    What was your lowest adult weight? (Please list date/timeframe)  220 lbs    Do you have any food allergies? (Please list)  No    Do you have any food intolerances?   No    How many meals do you eat throughout the day?   2    Do you snack throughout the day?   Yes    If yes, please list examples of snack foods  Popcorn, fruit, protein shakes    How often do you eat out per week? (including restaurants, fast food, ordering in, cafeteria, etc.)  Maybe once a week    How often do you drink…?     Milk   Maybe 1-2 cups of whole milk per week    Soda/Pop  None    Coffee/Tea   Decaf - amount not specified    Juice   None    Water   40oz+ a day    Alcohol   Occasionally - amount not specified    Other beverages (please list)  N/A   Do you currently smoke, vape, use tobacco and/or any nicotine products?  No    Who is primarily responsible for grocery shopping & cooking?  [employer]    Do you currently exercise?  Yes    If yes, how often and what type?   Walking daily    How many hours of sleep do you get each night on average?   N/A   Are you employed?   Yes    If yes, what is your typical work schedule?  8+ hours a day, 5 days a week   Do you identify with emotional eating?   No    What type of emotions trigger emotional eating? (ex: stress, boredom, anxiety, depression/sadness)   All these emotions triggered bad eating habits in the past    Do you have any of the following known diagnosis? High blood pressure, Diabetes, Sleep Apnea or Unknown  High blood pressure    On a scale of 1 -  10, how motivated do you feel to make changes?  10       Today's Lesson: Patient's watched a pre-recorded bariatric surgery webinar done by Dr. Yan. We reviewed Dr. Yan's lifelong rules. I provided a bariatric nutrition guide.   Diet Goals: Practice the lifelong rules  Exercise Recommendations: Gradually work up to 150 minutes of moderate intensity exercise per week.  Behavior Changes: will be assessed every month     Plan: Will follow up next month. Will continue to monitor pt's weight, dietary & behavior changes.        Mirela Ramírez RD, LDN  Registered Dietitian, Licensed Dietitian Nutritionist

## 2025-01-14 ENCOUNTER — APPOINTMENT (OUTPATIENT)
Dept: SURGERY | Facility: HOSPITAL | Age: 40
End: 2025-01-14
Payer: COMMERCIAL

## 2025-01-14 ENCOUNTER — APPOINTMENT (OUTPATIENT)
Dept: SURGERY | Facility: CLINIC | Age: 40
End: 2025-01-14
Payer: COMMERCIAL

## 2025-01-14 VITALS — BODY MASS INDEX: 43.19 KG/M2 | HEIGHT: 64 IN | WEIGHT: 253 LBS

## 2025-01-14 ASSESSMENT — PAIN SCALES - GENERAL: PAINLEVEL_OUTOF10: 0-NO PAIN

## 2025-01-22 ENCOUNTER — OFFICE VISIT (OUTPATIENT)
Dept: SURGERY | Facility: CLINIC | Age: 40
End: 2025-01-22
Payer: COMMERCIAL

## 2025-01-22 ENCOUNTER — APPOINTMENT (OUTPATIENT)
Dept: SURGERY | Facility: CLINIC | Age: 40
End: 2025-01-22
Payer: COMMERCIAL

## 2025-01-22 ENCOUNTER — TELEPHONE (OUTPATIENT)
Dept: SURGERY | Facility: CLINIC | Age: 40
End: 2025-01-22

## 2025-01-22 VITALS
RESPIRATION RATE: 16 BRPM | HEIGHT: 64 IN | BODY MASS INDEX: 43.02 KG/M2 | WEIGHT: 252 LBS | HEART RATE: 96 BPM | DIASTOLIC BLOOD PRESSURE: 69 MMHG | SYSTOLIC BLOOD PRESSURE: 149 MMHG

## 2025-01-22 DIAGNOSIS — G47.33 OBSTRUCTIVE SLEEP APNEA: ICD-10-CM

## 2025-01-22 DIAGNOSIS — R73.9 HYPERGLYCEMIA: ICD-10-CM

## 2025-01-22 DIAGNOSIS — Z01.818 PRE-OP EVALUATION: ICD-10-CM

## 2025-01-22 DIAGNOSIS — D68.9 COAGULATION DISORDER (MULTI): ICD-10-CM

## 2025-01-22 DIAGNOSIS — G44.309 POST-CONCUSSION HEADACHE: ICD-10-CM

## 2025-01-22 DIAGNOSIS — E63.9 NUTRITIONAL DISORDER: ICD-10-CM

## 2025-01-22 DIAGNOSIS — D50.8 IRON DEFICIENCY ANEMIA SECONDARY TO INADEQUATE DIETARY IRON INTAKE: ICD-10-CM

## 2025-01-22 DIAGNOSIS — E66.01 OBESITY, CLASS III, BMI 40-49.9 (MORBID OBESITY) (MULTI): Primary | ICD-10-CM

## 2025-01-22 DIAGNOSIS — Z71.3 ENCOUNTER FOR NUTRITION EVALUATION PRIOR TO BARIATRIC SURGERY: ICD-10-CM

## 2025-01-22 DIAGNOSIS — I10 PRIMARY HYPERTENSION: ICD-10-CM

## 2025-01-22 DIAGNOSIS — M54.16 LUMBAR RADICULOPATHY: ICD-10-CM

## 2025-01-22 DIAGNOSIS — E51.9 THIAMINE DEFICIENCY: ICD-10-CM

## 2025-01-22 PROCEDURE — 3008F BODY MASS INDEX DOCD: CPT | Performed by: INTERNAL MEDICINE

## 2025-01-22 PROCEDURE — 3077F SYST BP >= 140 MM HG: CPT | Performed by: INTERNAL MEDICINE

## 2025-01-22 PROCEDURE — 93000 ELECTROCARDIOGRAM COMPLETE: CPT | Performed by: INTERNAL MEDICINE

## 2025-01-22 PROCEDURE — 99204 OFFICE O/P NEW MOD 45 MIN: CPT | Performed by: INTERNAL MEDICINE

## 2025-01-22 PROCEDURE — 3078F DIAST BP <80 MM HG: CPT | Performed by: INTERNAL MEDICINE

## 2025-01-22 RX ORDER — DULOXETIN HYDROCHLORIDE 30 MG/1
30 CAPSULE, DELAYED RELEASE ORAL DAILY
Qty: 90 CAPSULE | Refills: 1 | Status: SHIPPED | OUTPATIENT
Start: 2025-01-22 | End: 2025-07-21

## 2025-01-22 ASSESSMENT — ENCOUNTER SYMPTOMS
HEADACHES: 1
DIFFICULTY URINATING: 0
FREQUENCY: 0
DEPRESSION: 0
NAUSEA: 0
DYSURIA: 0
NERVOUS/ANXIOUS: 0
ARTHRALGIAS: 1
ABDOMINAL PAIN: 0
CHILLS: 0
WEAKNESS: 0
COUGH: 0
BACK PAIN: 1
DIARRHEA: 0
OCCASIONAL FEELINGS OF UNSTEADINESS: 0
LOSS OF SENSATION IN FEET: 0
FEVER: 0
SHORTNESS OF BREATH: 0
CONSTIPATION: 0

## 2025-01-22 ASSESSMENT — PATIENT HEALTH QUESTIONNAIRE - PHQ9
2. FEELING DOWN, DEPRESSED OR HOPELESS: NOT AT ALL
SUM OF ALL RESPONSES TO PHQ9 QUESTIONS 1 AND 2: 0
1. LITTLE INTEREST OR PLEASURE IN DOING THINGS: NOT AT ALL

## 2025-01-22 ASSESSMENT — PAIN SCALES - GENERAL: PAINLEVEL_OUTOF10: 7

## 2025-01-22 NOTE — PROGRESS NOTES
"Subjective   Patient ID: Анна Bird is a 40 y.o. female who presents for [facility] Visit 1.    Patient has been trying to lose weight for many years by following different diets like Weight Watchers, lost some weight but did not maintain. Has been educated on this material before. Started a medical weight loss program last year. Had a surgery date established for March 24th (with Dr. Burnett) and was already approved. Has been working with a dietician. Currently has 3 meals a day and 20g of protein with each meal. Snacks on fruit and sweet potato chips cooked in avocado oil.  Drinks water. Regarding exercise, walks up and down her stairs multiple times a day. Denies any fhx of blood clots. Denies having heavy periods. Takes an iron supplement.     Diagnostics Reviewed: 6/25/2024 Sleep Study: revealed mild sleep apnea.        Labs Reviewed: 9/27/2024 Blood Work: Iron 14.          Review of Systems   Constitutional:  Negative for chills and fever.        Snoring, insomnia, poor sleep, and excessive daytime somnolence.     HENT:  Negative for dental problem.    Respiratory:  Negative for cough and shortness of breath.    Gastrointestinal:  Negative for abdominal pain, constipation, diarrhea and nausea.   Genitourinary:  Negative for difficulty urinating, dysuria and frequency.   Musculoskeletal:  Positive for arthralgias and back pain.   Neurological:  Positive for headaches. Negative for weakness.   Psychiatric/Behavioral:  Negative for behavioral problems. The patient is not nervous/anxious.      Objective   /69   Pulse 96   Resp 16   Ht 1.613 m (5' 3.5\")   Wt 114 kg (252 lb)   BMI 43.94 kg/m²     Physical Exam  Constitutional:       General: She is not in acute distress.     Appearance: She is obese.   HENT:      Mouth/Throat:      Comments: Dentition WNL  Cardiovascular:      Rate and Rhythm: Normal rate and regular rhythm.      Heart sounds: Normal heart sounds.   Pulmonary:      Breath sounds: Normal " breath sounds.   Abdominal:      Palpations: Abdomen is soft.      Tenderness: There is no abdominal tenderness.   Musculoskeletal:      Cervical back: No tenderness.      Right lower leg: No edema.      Left lower leg: No edema.   Lymphadenopathy:      Cervical: No cervical adenopathy.   Skin:     General: Skin is warm.      Findings: No erythema.   Neurological:      Mental Status: She is alert and oriented to person, place, and time.      Gait: Gait is intact. Gait normal.      Comments: Post Concussional Headache   Psychiatric:         Mood and Affect: Mood normal.         Behavior: Behavior normal.       Assessment/Plan   Diagnoses and all orders for this visit:  Obesity, Class III, BMI 40-49.9 (morbid obesity) (Multi)  Pre-op evaluation  -     ECG 12 lead (Clinic Performed)  Primary hypertension  Iron deficiency anemia secondary to inadequate dietary iron intake  Obstructive sleep apnea  -     Positive Airway Pressure (PAP) Therapy  Lumbar radiculopathy  Post-concussion headache  -     DULoxetine (Cymbalta) 30 mg DR capsule; Take 1 capsule (30 mg) by mouth once daily. Do not crush or chew.  Coagulation disorder (Multi)  Hyperglycemia  Encounter for nutrition evaluation prior to bariatric surgery  -     aPTT; Future  -     Protime-INR; Future  Nutritional disorder  -     Vitamin A; Future  -     Vitamin E; Future  Thiamine deficiency      Scribe Attestation  By signing my name below, IJosh Scribe   attest that this documentation has been prepared under the direction and in the presence of Katlin Patricio MD.        Yes

## 2025-02-04 ENCOUNTER — APPOINTMENT (OUTPATIENT)
Dept: SURGERY | Facility: HOSPITAL | Age: 40
End: 2025-02-04
Payer: COMMERCIAL

## 2025-02-07 ENCOUNTER — APPOINTMENT (OUTPATIENT)
Dept: SURGERY | Facility: CLINIC | Age: 40
End: 2025-02-07
Payer: COMMERCIAL

## 2025-02-11 NOTE — PROGRESS NOTES
Medical Weight Loss Appointment (MWL 2)    Starting Weight: 253 lbs (on 1/14/25). Pt's EMR shows an initial weight with another  Bariatric surgery center on 11/14/23 as 268 lbs. This reflects an overall weight loss of 15 lbs.   Current Weight: 253 lbs / wt is stable x 1 month.   Current BMI: 44.11     Current Diet: practicing the lifelong rules.   Adherence: good.   Daily Intake: 3 meals/day    Breakfast- 2 eggs, a Fairlife protein shake, and either turkey coronado or turkey sausage   Lunch- a Fairlife protein shake and either salmon/tuna with greens   Dinner- always a protein (ex: baked chicken, fish, tuna) and a vegetable (usually broccoli), sometimes also adding a sweet potato   Snacks: none   Beverages: just water and Fairlife protein shakes  Exercise: active at work, plus walking up/down the stairs   Behavior/Diet Changes:   - incorporated 3 meals/day     Estimated ability to achieve goals: good.     Today's Lesson: Reviewed patient's dietary changes and food recall. Suggested that pt reduce protein shake intake if she is having a high protein solid meal, or use as a meal replacement.   Diet Goals: Practice the lifelong rules  Exercise Recommendations: Gradually work up to 150 minutes of moderate intensity exercise per week.  Behavior Goals:  1. Continue to follow the lifelong rules.     Plan: Provided dietary clearance at today's appointment. We will follow up next month for a weight check.       Mirela Ramírez RD, LDN   Registered Dietitian, Licensed Dietitian Nutritionist

## 2025-02-19 ENCOUNTER — APPOINTMENT (OUTPATIENT)
Dept: SURGERY | Facility: CLINIC | Age: 40
End: 2025-02-19
Payer: COMMERCIAL

## 2025-02-19 VITALS
WEIGHT: 253 LBS | DIASTOLIC BLOOD PRESSURE: 101 MMHG | HEART RATE: 68 BPM | HEIGHT: 64 IN | SYSTOLIC BLOOD PRESSURE: 152 MMHG | BODY MASS INDEX: 43.19 KG/M2

## 2025-02-19 DIAGNOSIS — E66.01 OBESITY, CLASS III, BMI 40-49.9 (MORBID OBESITY) (MULTI): ICD-10-CM

## 2025-02-19 DIAGNOSIS — G47.33 OBSTRUCTIVE SLEEP APNEA: ICD-10-CM

## 2025-02-19 DIAGNOSIS — D50.8 IRON DEFICIENCY ANEMIA SECONDARY TO INADEQUATE DIETARY IRON INTAKE: ICD-10-CM

## 2025-02-19 DIAGNOSIS — I10 PRIMARY HYPERTENSION: Primary | ICD-10-CM

## 2025-02-19 PROCEDURE — 99213 OFFICE O/P EST LOW 20 MIN: CPT | Performed by: INTERNAL MEDICINE

## 2025-02-19 PROCEDURE — 3077F SYST BP >= 140 MM HG: CPT | Performed by: INTERNAL MEDICINE

## 2025-02-19 PROCEDURE — 3008F BODY MASS INDEX DOCD: CPT | Performed by: INTERNAL MEDICINE

## 2025-02-19 PROCEDURE — 3080F DIAST BP >= 90 MM HG: CPT | Performed by: INTERNAL MEDICINE

## 2025-02-19 ASSESSMENT — ENCOUNTER SYMPTOMS
SHORTNESS OF BREATH: 0
ARTHRALGIAS: 0
NAUSEA: 0
ABDOMINAL PAIN: 0
CONSTIPATION: 0
DIARRHEA: 0
DIZZINESS: 0
COUGH: 0
PALPITATIONS: 0

## 2025-02-19 ASSESSMENT — PAIN SCALES - GENERAL: PAINLEVEL_OUTOF10: 0-NO PAIN

## 2025-02-19 ASSESSMENT — PATIENT HEALTH QUESTIONNAIRE - PHQ9
2. FEELING DOWN, DEPRESSED OR HOPELESS: NOT AT ALL
1. LITTLE INTEREST OR PLEASURE IN DOING THINGS: NOT AT ALL
SUM OF ALL RESPONSES TO PHQ9 QUESTIONS 1 AND 2: 0

## 2025-02-19 ASSESSMENT — LIFESTYLE VARIABLES
HOW MANY STANDARD DRINKS CONTAINING ALCOHOL DO YOU HAVE ON A TYPICAL DAY: 1 OR 2
HOW OFTEN DO YOU HAVE SIX OR MORE DRINKS ON ONE OCCASION: NEVER
HOW OFTEN DO YOU HAVE A DRINK CONTAINING ALCOHOL: MONTHLY OR LESS
AUDIT-C TOTAL SCORE: 1
SKIP TO QUESTIONS 9-10: 1

## 2025-02-19 NOTE — PROGRESS NOTES
"Subjective   Patient ID: Анна Bird is a 40 y.o. female who presents for [facility] Visit 2.     Currently has 3 meals a day and has 20g of protein with each meal. She stopped snacking Drinks more water. Regarding exercise, walks a lot at work, up and down her stairs multiple times a day. She is taking an iron supplement. She did not get the APAP yet, order faxed again today  Diagnostics Reviewed: 6/25/2024 Sleep Study: revealed mild sleep apnea.        Labs Reviewed: 9/27/2024 Blood Work: Iron 14. Will get the rest of labs done         Review of Systems   Respiratory:  Negative for cough and shortness of breath.    Cardiovascular:  Negative for chest pain and palpitations.   Gastrointestinal:  Negative for abdominal pain, constipation, diarrhea and nausea.   Musculoskeletal:  Negative for arthralgias.   Neurological:  Negative for dizziness.       Objective   BP (!) 152/101 (BP Location: Left arm, Patient Position: Sitting)   Pulse 68   Ht 1.613 m (5' 3.5\")   Wt 115 kg (253 lb)   BMI 44.11 kg/m²     Physical Exam  Cardiovascular:      Rate and Rhythm: Normal rate and regular rhythm.      Heart sounds: Normal heart sounds.   Pulmonary:      Breath sounds: Normal breath sounds.   Abdominal:      Palpations: Abdomen is soft. There is no hepatomegaly.      Tenderness: There is no abdominal tenderness.   Musculoskeletal:      Right lower leg: No edema.      Left lower leg: No edema.   Neurological:      Mental Status: She is alert and oriented to person, place, and time.      Gait: Gait normal.   Psychiatric:         Mood and Affect: Mood normal.         Behavior: Behavior normal.     Assessment/Plan   Diagnoses and all orders for this visit:  Primary hypertension  Iron deficiency anemia secondary to inadequate dietary iron intake  Obstructive sleep apnea  -     Positive Airway Pressure (PAP) Therapy  Obesity, Class III, BMI 40-49.9 (morbid obesity) (Multi)           "

## 2025-03-03 ENCOUNTER — TELEPHONE (OUTPATIENT)
Dept: SURGERY | Facility: CLINIC | Age: 40
End: 2025-03-03
Payer: COMMERCIAL

## 2025-03-11 ENCOUNTER — APPOINTMENT (OUTPATIENT)
Dept: SURGERY | Facility: HOSPITAL | Age: 40
End: 2025-03-11
Payer: COMMERCIAL

## 2025-03-20 ENCOUNTER — APPOINTMENT (OUTPATIENT)
Dept: SURGERY | Facility: CLINIC | Age: 40
End: 2025-03-20
Payer: COMMERCIAL

## 2025-03-25 ENCOUNTER — DOCUMENTATION (OUTPATIENT)
Dept: SURGERY | Facility: CLINIC | Age: 40
End: 2025-03-25
Payer: COMMERCIAL

## 2025-03-31 ENCOUNTER — APPOINTMENT (OUTPATIENT)
Dept: SURGERY | Facility: CLINIC | Age: 40
End: 2025-03-31
Payer: COMMERCIAL

## 2025-03-31 ENCOUNTER — DOCUMENTATION (OUTPATIENT)
Dept: SURGERY | Facility: CLINIC | Age: 40
End: 2025-03-31

## 2025-03-31 ENCOUNTER — NUTRITION (OUTPATIENT)
Dept: SURGERY | Facility: CLINIC | Age: 40
End: 2025-03-31
Payer: COMMERCIAL

## 2025-03-31 VITALS — WEIGHT: 253 LBS | BODY MASS INDEX: 44.83 KG/M2 | HEIGHT: 63 IN

## 2025-03-31 DIAGNOSIS — Z98.84 BARIATRIC SURGERY STATUS: ICD-10-CM

## 2025-03-31 DIAGNOSIS — E66.01 MORBID OBESITY (MULTI): ICD-10-CM

## 2025-03-31 PROCEDURE — 3008F BODY MASS INDEX DOCD: CPT | Performed by: SURGERY

## 2025-03-31 PROCEDURE — 1036F TOBACCO NON-USER: CPT | Performed by: SURGERY

## 2025-03-31 PROCEDURE — 99204 OFFICE O/P NEW MOD 45 MIN: CPT | Performed by: SURGERY
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
## 2025-03-31 NOTE — PROGRESS NOTES
Subjective     Date: 3/31/2025 Time: 10:54 AM  Name: Анна Bird  MRN: [id number]    This is a 40 y.o. female with morbid obesity (Body mass index is 44.82 kg/m².) who presents to clinic for consideration of bariatric surgery. she has attempted and failed multiple diet and exercise regimens for weight loss.  She has been going through the bariatric program, and for various insurance reasons she has had to switch surgeons twice now.  She has completed all of her clearances, and is ready to be submitted.      PMHx: HTN, IRVIN,  migraines, DDD, dyspnea, back pain and siatica on NSAIDS, urinary incontinence    PSHX: Lap Selin    SLEEVE Gastric Surgery For Morbid Obesity Laparoscopic Longitudinal Gastrectomy     0 = No symptoms    PMH:   Past Medical History:   Diagnosis Date    Abnormal chromosomal and genetic finding on  screening of mother 2016    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    Chondrocostal junction syndrome (tietze) 2015    Costochondritis    Encounter for  screening for Streptococcus B      screening for streptococcus B    Encounter for routine checking of intrauterine contraceptive device 2016    Intrauterine device surveillance    Encounter for routine postpartum follow-up 2017    Normal postpartum course    Encounter for screening for diseases of the blood and blood-forming organs and certain disorders involving the immune mechanism 2015    Screening for deficiency anemia    Encounter for supervision of other normal pregnancy, unspecified trimester ([facility]) 2020    Prenatal care, subsequent pregnancy    Nocturia 2016    Nocturia    Obesity complicating pregnancy, unspecified trimester ([facility]) 2017    Obesity in pregnancy    Other specified abnormal immunological findings in serum 2015    Positive Helicobacter pylori serology    Other specified personal risk factors, not elsewhere classified     At  risk of UTI    Personal history of other diseases of the musculoskeletal system and connective tissue 2015    History of chronic back pain    Personal history of other endocrine, nutritional and metabolic disease 10/29/2013    History of vitamin D deficiency    Personal history of other specified conditions 2014    History of insomnia    Personal history of other specified conditions 10/24/2013    History of urinary frequency    Personal history of other specified conditions 2016    History of dysuria    Personal history of other specified conditions 2018    History of dysuria    Retention of urine, unspecified 2020    Incomplete emptying of bladder    Supervision of pregnancy with history of pre-term labor, unspecified trimester ([facility]) 2017    Previous  delivery, antepartum    Unspecified pre-existing hypertension complicating pregnancy, unspecified trimester ([facility]) 2017    Chronic hypertension in pregnancy    Urge incontinence     Sensory urge incontinence        PSH:   Past Surgical History:   Procedure Laterality Date    CHOLECYSTECTOMY  2013    Cholecystectomy Laparoscopic          FAMILY HISTORY:  Family History   Problem Relation Name Age of Onset    Hypertension Mother Grandmother     Cancer Mother Grandmother     Diabetes Mother Grandmother     Hypertension Father Regulo     Arthritis Father Regulo     Cancer Paternal Grandmother Gabby         SOCIAL HISTORY:  Social History     Tobacco Use    Smoking status: Never     Passive exposure: Never    Smokeless tobacco: Never   Vaping Use    Vaping status: Never Used   Substance Use Topics    Alcohol use: Yes     Alcohol/week: 2.0 standard drinks of alcohol     Types: 2 Glasses of wine per week     Comment: casually    Drug use: Never       MEDICATIONS:  Prior to Admission Medications:  Medication Documentation Review Audit       Reviewed by Chelita Fernandez RN (Registered Nurse) on 25 at 1044       Medication Order Taking? Sig Documenting Provider Last Dose Status   amLODIPine (Norvasc) 5 mg tablet [id number]  Take 1 tablet (5 mg) by mouth once daily. Darby Charles DO  Active   chlorthalidone (Hygroton) 25 mg tablet [id number]  Take 1 tablet (25 mg) by mouth once daily. Darby Charles DO  Active   cyclobenzaprine (Flexeril) 10 mg tablet [id number]  Take 1 tablet (10 mg) by mouth once daily at bedtime. Darby Charles DO  Active   diclofenac sodium 1 % kit [id number] No Apply topically once daily. Apply sparingly to affected area(s) Historical Provider, MD Taking Active   DULoxetine (Cymbalta) 30 mg DR capsule [id number]  Take 1 capsule (30 mg) by mouth once daily. Do not crush or chew. Katlin Patricio MD  Active   ferrous sulfate 325 (65 Fe) MG tablet [id number] No Take 1 tablet (325 mg) by mouth every other day. Without food to increase absorption Historical Provider, MD Taking Active   lidocaine (Lidoderm) 5 % patch [id number]  Place 1 patch over 12 hours on the skin once daily. Apply to painful area 12 hours per day, remove for 12 hours. Darby Charles DO  Active                     ALLERGIES:  Allergies   Allergen Reactions    Iodinated Contrast Media Unknown       REVIEW OF SYSTEMS:  GENERAL: Negative for malaise, significant weight loss and fever  HEAD: Negative for headache, swelling.  NECK: Negative for lumps, goiter, pain and significant neck swelling  RESPIRATORY: Negative for cough, wheezing or shortness of breath.  CARDIOVASCULAR: Negative for chest pain, leg swelling or palpitations.  GI: Negative for abdominal discomfort, blood in stools or black stools or change in bowel habits  : No history of dysuria, frequency or incontinence  MUSCULOSKELETAL: Negative for joint pain or swelling, back pain or muscle pain.  SKIN: Negative for lesions, rash, and itching.  PSYCH: Negative for sleep disturbance, mood disorder and recent psychosocial stressors.  ENDOCRINE: Negative for cold or heat  "intolerance, polyuria, polydipsia and goiter.    Objective   PHYSICAL EXAM:  Visit Vitals  Ht 1.6 m (5' 3\")   Wt 115 kg (253 lb)   BMI 44.82 kg/m²   OB Status Having periods   Smoking Status Never   BSA 2.26 m²     General appearance: obese, NAD  Neuro: AOx3  Head: EOMI; no swelling or lesions of scalp or face  ENT:  no lumps or lymphadenopathy, thyroid normal to palpation; oropharynx clear, no swelling or erythema  Skin: warm, no erythema or rashes  Lungs: clear to percussion and auscultation  Heart: regular rhythm and S1, S2 normal  Abdomen: soft, non-tender, no masses, no organomegaly  Extremities: Normal exam of the extremities. No swelling or pain.  Psych: no hurried speech, no flight of ideas, normal affect    Assessment/Plan   IMPRESSION:  Анна Bird is a 40 y.o. female with a BMI of Body mass index is 44.82 kg/m². with the following diagnoses and co-morbidities:     Past Medical History:   Diagnosis Date    Abnormal chromosomal and genetic finding on  screening of mother 2016    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    Chondrocostal junction syndrome (tietze) 2015    Costochondritis    Encounter for  screening for Streptococcus B      screening for streptococcus B    Encounter for routine checking of intrauterine contraceptive device 2016    Intrauterine device surveillance    Encounter for routine postpartum follow-up 2017    Normal postpartum course    Encounter for screening for diseases of the blood and blood-forming organs and certain disorders involving the immune mechanism 2015    Screening for deficiency anemia    Encounter for supervision of other normal pregnancy, unspecified trimester ([facility]) 2020    Prenatal care, subsequent pregnancy    Nocturia 2016    Nocturia    Obesity complicating pregnancy, unspecified trimester ([facility]) 2017    Obesity in pregnancy    Other specified abnormal " immunological findings in serum 2015    Positive Helicobacter pylori serology    Other specified personal risk factors, not elsewhere classified     At risk of UTI    Personal history of other diseases of the musculoskeletal system and connective tissue 2015    History of chronic back pain    Personal history of other endocrine, nutritional and metabolic disease 10/29/2013    History of vitamin D deficiency    Personal history of other specified conditions 2014    History of insomnia    Personal history of other specified conditions 10/24/2013    History of urinary frequency    Personal history of other specified conditions 2016    History of dysuria    Personal history of other specified conditions 2018    History of dysuria    Retention of urine, unspecified 2020    Incomplete emptying of bladder    Supervision of pregnancy with history of pre-term labor, unspecified trimester ([facility]) 2017    Previous  delivery, antepartum    Unspecified pre-existing hypertension complicating pregnancy, unspecified trimester ([facility]) 2017    Chronic hypertension in pregnancy    Urge incontinence     Sensory urge incontinence       This patient does meet the criteria for a surgical weight loss procedure according to NIH guidelines.  The risks of sleeve gastrectomy surgery including but not limited to bleeding, leak along staple lines, infection, dehydration, ulcers, internal hernia, DVT/PE, pneumonia, myocardial infarction, prolonged nausea/vomiting, incomplete resolution of associated medical conditions, reflux, weight regain, vitamin/mineral deficiencies, and death have been explained to the patient and Анна Bird has expressed understanding and acceptance of them.     We discussed the lifestyle changes necessary to be successful following surgery.    The increased risk of substance and alcohol abuse following bariatric surgery was discussed with the patient, along  with the negative consequences of substance/alcohol use after surgery including addiction, worsening of mental health disorders, and injury to the stomach. The risk of smoking and vaping (tobacco or any other substance) after bariatric surgery was explained to the patient. This includes risk of anastamotic ulcers, gastritis, bleeding, perforation, stricture, and PO intolerance.  The patient expressed understanding and acceptance of these risks.    The patient was advised not to become pregnant within 12-18 months following bariatric surgery. She was educated on the increased risks to mother and fetus associated with pregnancy within 2 years of bariatric surgery.    The benefits of the above surgeries including weight loss, improvement/resolution of associated medical and mental health conditions, improved mobility, and decreased mortality have been explained the the patient and Анна Bird has expressed understanding and acceptance of them.      PLAN:  The plan of treatment for Анна Bird is to continue with the consultations and tests ordered today in hopes of qualifying for pre-operative clearance for bariatric surgery. This includes:    She has completed her clearances and is ready to be scheduled for surgery     The following are some lifestyle changes you should begin to prepare you for your surgery.   Eliminate soda and other carbonated beverages from your diet. Carbonation will not be well tolerated after surgery. Try Propel, Vitamin Water Zero, Sobe Lifewater, Crystal Light or water.    Increase fluid consumption to 64 oz daily. Do not drink within 30 minutes of eating as this will liquefy your food and make you hungry more quickly.    Exercise for 30-60 minutes daily. Brisk walking, bike riding and swimming are all examples of healthy exercise. If you are unable to exercise we recommend seated exercise.    Do not skip meals.    Take a multivitamin daily.    Lose weight. In preparation for  your surgery it is important that you begin making healthier food choices now. Our dietitian will meet with you to help you select foods lower in calories and higher in nutrition. We would like you to lose at least 5 lbs prior to surgery.     Increase your protein intake to 60 grams per day.    Alcohol is empty calories. Please eliminate while preparing for surgery.    Plan your meals.      General Instruction:   1) Use the information we gave you today to work through your insurance requirements and medical clearances.   2) These documents need to get faxed or emailed to the program navigators so they can submit them for approval from your insurance company.   3) Obtain labs today at a  facility. We will call you with any abnormalities and corrections you need to make.   4) Continue to work with your primary care doctor and other specialist so your other health problems are well controlled prior to your surgery.   5) Adopt the recommendations of the program dietician so you develop healthy eating patterns.   6) Work with the sleep team to get your sleep apnea treated to prevent other health problems .   7) Consider attending a support group to learn from other who have been through the process.   8) Come to the [facility] sessions.       60 minutes were spent with patient including history, physical exam, and education.    Kristopher Camacho MD

## 2025-03-31 NOTE — PROGRESS NOTES
Weight Check     Current Weight: 253 lbs / wt remains stable.   Current BMI: 44.82    Pt seeking: VSG   Daily Intake: 3 meals/day   Breakfast- hard boiled eggs, turkey coronado/sausage and fruit   Lunch- a Premier Protein shake, or a salad with roasted turkey/chicken and veggies, or leftovers from dinner meal   Dinner- always a meat, a vegetable and an occasional minimal starch   Snacks: none   Beverages: just water (pt estimates at least 130 oz daily) and occasional Premier Protein shake as a meal replacement   Exercise: consciously walking up/down stairs more and active throughout the day.       Mirela Ramírez RD, LDN  Bariatric Dietitian  [phone]  
Statement Selected

## 2025-03-31 NOTE — PATIENT INSTRUCTIONS
The following are some lifestyle changes you should begin to prepare you for your surgery.   Eliminate soda and other carbonated beverages from your diet. Carbonation will not be well tolerated after surgery. Try Propel, Vitamin Water Zero, Sobe Lifewater, Crystal Light or water.    Increase fluid consumption to 64 oz daily. Do not drink within 30 minutes of eating as this will liquefy your food and make you hungry more quickly.    Exercise for 30-60 minutes daily. Brisk walking, bike riding and swimming are all examples of healthy exercise. If you are unable to exercise we recommend seated exercise.    Do not skip meals.    Take a multivitamin daily.    Lose weight. In preparation for your surgery it is important that you begin making healthier food choices now. Our dietitian will meet with you to help you select foods lower in calories and higher in nutrition. We would like you to lose at least 10 lbs prior to surgery.     Increase your protein intake to 60 grams per day.    Alcohol is empty calories. Please eliminate while preparing for surgery.    Plan your meals.      General Instruction:   1) Use the information we gave you today to work through your insurance requirements and medical clearances.   2) These documents need to get faxed or emailed to the program navigators so they can submit them for approval from your insurance company.   3) Obtain labs today at a  facility. We will call you with any abnormalities and corrections you need to make.   4) Continue to work with your primary care doctor and other specialist so your other health problems are well controlled prior to your surgery.   5) Adopt the recommendations of the program dietician so you develop healthy eating patterns.   6) Work with the sleep team to get your sleep apnea treated to prevent other health problems .   7) Consider attending a support group to learn from other who have been through the process.   8) Come to the [facility]  sessions.

## 2025-04-01 ENCOUNTER — APPOINTMENT (OUTPATIENT)
Dept: SURGERY | Facility: HOSPITAL | Age: 40
End: 2025-04-01
Payer: COMMERCIAL

## 2025-04-04 LAB
A-TOCOPHEROL VIT E SERPL-MCNC: 12.6 MG/L (ref 5.7–19.9)
APTT PPP: 33 SEC (ref 23–32)
BETA+GAMMA TOCOPHEROL SERPL-MCNC: <1 MG/L
INR PPP: 1
PROTHROMBIN TIME: 10.7 SEC (ref 9–11.5)
VIT A SERPL-MCNC: 34 MCG/DL (ref 38–98)

## 2025-04-08 ENCOUNTER — TELEPHONE (OUTPATIENT)
Dept: SURGERY | Facility: CLINIC | Age: 40
End: 2025-04-08
Payer: COMMERCIAL

## 2025-04-08 NOTE — TELEPHONE ENCOUNTER
Spoke with Lisbeth at [facility] with call reference [phone]. Phone number she provided was [phone]. [facility] was looking for more clinical documentation for the patient to clear for surgery. Will reach out to patient to update them on their clearance.

## 2025-04-24 NOTE — PROGRESS NOTES
Pre-Operative Dietary Education     Surgery: VSG on 5/14/25.  Pt was instructed to begin the 2 week pre-operative diet on Wednesday, 4/30.     Reviewed the 2 week pre-operative and post-operative diet that patient will be required to follow. Reviewed low calorie fluids along with protein shakes and products that can be consumed. Emphasized the importance of following diet guidelines and recommendations before/after surgery to prevent complications. Addressed items to avoid at this time. Patients are educated to drink at least 64 oz of fluid per day, and consume at least 60 grams of protein per day. Briefly reviewed the diet progression for the next 3 months, which will also be discussed at each follow up appointment after surgery. Also reviewed vitamin supplementation after bariatric surgery. Patient was instructed to take chewable MVI with iron once daily for the first 6 weeks after surgery. Other supplementation will be introduced at 6 week follow up appointment.       Mirela Ramírez RD, LDN  Bariatric Dietitian  [phone]

## 2025-04-25 ENCOUNTER — APPOINTMENT (OUTPATIENT)
Dept: SURGERY | Facility: CLINIC | Age: 40
End: 2025-04-25
Payer: COMMERCIAL

## 2025-04-29 ENCOUNTER — APPOINTMENT (OUTPATIENT)
Dept: SURGERY | Facility: HOSPITAL | Age: 40
End: 2025-04-29
Payer: COMMERCIAL
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
## 2025-05-01 NOTE — PATIENT INSTRUCTIONS
You are scheduled for a: Gastric sleeve with Dr. Camacho on 5/14.    Nothing but CLEAR liquids on the day before surgery.  Avoid caffeine, carbonation, and anything with RED dye.  Nothing to eat or drink after midnight on the night before surgery.    You will receive a phone call the day prior to surgery with your OR arrival time.  Be sure to read over your Pre-Op book for final preparation for surgery.  Make a shopping list and  your supplements prior to surgery.   You have been provided with a pain Script today- TAKE THIS TODAY to your pharmacy as it may need a prior authorization.  Electronic prescriptions were sent to your pharmacy. Prescriptions for Omeprazole (antacid), and Ondansetron (anti-Nausea),  and Oxycodone (pain) have been sent to your retail pharmacy.  Pick these up, these are for after surgery.  Be sure to take the omeprazole, open the capsule and sprinkle over SF applesauce and take every day. DO NOT MISS A DOSE.   Call with any questions! [phone] jose Rice.

## 2025-05-02 ENCOUNTER — TELEPHONE (OUTPATIENT)
Dept: SURGERY | Facility: CLINIC | Age: 40
End: 2025-05-02
Payer: COMMERCIAL

## 2025-05-05 ENCOUNTER — APPOINTMENT (OUTPATIENT)
Dept: SURGERY | Facility: CLINIC | Age: 40
End: 2025-05-05
Payer: COMMERCIAL

## 2025-05-05 VITALS — WEIGHT: 249 LBS | BODY MASS INDEX: 44.11 KG/M2

## 2025-05-05 DIAGNOSIS — E66.01 MORBID OBESITY (MULTI): ICD-10-CM

## 2025-05-05 DIAGNOSIS — E66.813 OBESITY, CLASS III, BMI 40-49.9 (MORBID OBESITY): Primary | ICD-10-CM

## 2025-05-05 DIAGNOSIS — Z98.84 BARIATRIC SURGERY STATUS: ICD-10-CM

## 2025-05-05 PROCEDURE — 99214 OFFICE O/P EST MOD 30 MIN: CPT | Performed by: SURGERY

## 2025-05-06 ENCOUNTER — APPOINTMENT (OUTPATIENT)
Dept: SURGERY | Facility: HOSPITAL | Age: 40
End: 2025-05-06
Payer: COMMERCIAL

## 2025-05-07 ENCOUNTER — PRE-ADMISSION TESTING (OUTPATIENT)
Dept: PREADMISSION TESTING | Facility: HOSPITAL | Age: 40
End: 2025-05-07
Payer: COMMERCIAL

## 2025-05-07 VITALS
DIASTOLIC BLOOD PRESSURE: 99 MMHG | WEIGHT: 254.7 LBS | BODY MASS INDEX: 45.13 KG/M2 | TEMPERATURE: 97.3 F | SYSTOLIC BLOOD PRESSURE: 141 MMHG | RESPIRATION RATE: 16 BRPM | OXYGEN SATURATION: 98 % | HEART RATE: 77 BPM | HEIGHT: 63 IN

## 2025-05-07 DIAGNOSIS — Z01.818 PRE-OP EXAMINATION: Primary | ICD-10-CM

## 2025-05-07 PROCEDURE — 99204 OFFICE O/P NEW MOD 45 MIN: CPT

## 2025-05-07 PROCEDURE — 87081 CULTURE SCREEN ONLY: CPT | Mod: WESLAB

## 2025-05-07 RX ORDER — NITROFURANTOIN 25; 75 MG/1; MG/1
100 CAPSULE ORAL DAILY
COMMUNITY
Start: 2025-03-12

## 2025-05-07 RX ORDER — MULTIVIT-MIN/IRON FUM/FOLIC AC 7.5 MG-4
1 TABLET ORAL DAILY
COMMUNITY

## 2025-05-07 RX ORDER — CHLORHEXIDINE GLUCONATE ORAL RINSE 1.2 MG/ML
SOLUTION DENTAL
Qty: 473 ML | Refills: 0 | Status: SHIPPED | OUTPATIENT
Start: 2025-05-07 | End: 2025-05-14

## 2025-05-07 RX ORDER — ACETAMINOPHEN 500 MG
1000 TABLET ORAL EVERY 8 HOURS PRN
COMMUNITY

## 2025-05-07 ASSESSMENT — DUKE ACTIVITY SCORE INDEX (DASI)
CAN YOU TAKE CARE OF YOURSELF (EAT, DRESS, BATHE, OR USE TOILET): YES
DASI METS SCORE: 9
CAN YOU DO MODERATE WORK AROUND THE HOUSE LIKE VACUUMING, SWEEPING FLOORS OR CARRYING GROCERIES: YES
CAN YOU DO YARD WORK LIKE RAKING LEAVES, WEEDING OR PUSHING A MOWER: YES
CAN YOU DO HEAVY WORK AROUND THE HOUSE LIKE SCRUBBING FLOORS OR LIFTING AND MOVING HEAVY FURNITURE: YES
CAN YOU DO LIGHT WORK AROUND THE HOUSE LIKE DUSTING OR WASHING DISHES: YES
CAN YOU WALK INDOORS, SUCH AS AROUND YOUR HOUSE: YES
CAN YOU HAVE SEXUAL RELATIONS: YES
TOTAL_SCORE: 50.7
CAN YOU WALK A BLOCK OR TWO ON LEVEL GROUND: YES
CAN YOU CLIMB A FLIGHT OF STAIRS OR WALK UP A HILL: YES
CAN YOU PARTICIPATE IN MODERATE RECREATIONAL ACTIVITIES LIKE GOLF, BOWLING, DANCING, DOUBLES TENNIS OR THROWING A BASEBALL OR FOOTBALL: YES
CAN YOU RUN A SHORT DISTANCE: YES
CAN YOU PARTICIPATE IN STRENOUS SPORTS LIKE SWIMMING, SINGLES TENNIS, FOOTBALL, BASKETBALL, OR SKIING: NO

## 2025-05-07 ASSESSMENT — ENCOUNTER SYMPTOMS
CONSTITUTIONAL NEGATIVE: 1
CARDIOVASCULAR NEGATIVE: 1
PSYCHIATRIC NEGATIVE: 1
MUSCULOSKELETAL NEGATIVE: 1
ENDOCRINE NEGATIVE: 1
NEUROLOGICAL NEGATIVE: 1
GASTROINTESTINAL NEGATIVE: 1
EYES NEGATIVE: 1
RESPIRATORY NEGATIVE: 1
FREQUENCY: 1
ALLERGIC/IMMUNOLOGIC NEGATIVE: 1
HEMATOLOGIC/LYMPHATIC NEGATIVE: 1

## 2025-05-07 ASSESSMENT — PAIN - FUNCTIONAL ASSESSMENT: PAIN_FUNCTIONAL_ASSESSMENT: 0-10

## 2025-05-07 ASSESSMENT — PAIN SCALES - GENERAL: PAINLEVEL_OUTOF10: 0 - NO PAIN

## 2025-05-07 NOTE — H&P (VIEW-ONLY)
CPM/PAT Evaluation       Name: Анна Bird (Анна Bird)  /Age: 1985/40 y.o.     In-Person       Chief Complaint: Bariatric Sleeve    HPI: Анна Bird is a 40 year old female who has a history of hypertension, spinal stenosis, and sleep apnea. She is scheduled for a bariatric sleeve surgery.  Per Dr. Camacho's note 3/31/25 she has completed all pre op clearances. She denies fever, chills, nausea, vomiting, chest pain, sob, dizziness, and palpitations.     Medical History[1]    Surgical History[2]    Social History     Tobacco Use    Smoking status: Never     Passive exposure: Never    Smokeless tobacco: Never   Substance Use Topics    Alcohol use: Yes     Alcohol/week: 2.0 standard drinks of alcohol     Types: 2 Glasses of wine per week     Comment: casually     Social History     Substance and Sexual Activity   Drug Use Never       Family History[3]    Allergies[4]  Current Outpatient Medications   Medication Sig Dispense Refill    DULoxetine (Cymbalta) 30 mg DR capsule Take 1 capsule (30 mg) by mouth once daily. Do not crush or chew. 90 capsule 1    nitrofurantoin, macrocrystal-monohydrate, (Macrobid) 100 mg capsule Take 1 capsule (100 mg) by mouth once daily.      acetaminophen (Tylenol) 500 mg tablet Take 2 tablets (1,000 mg) by mouth every 8 hours if needed for mild pain (1 - 3) or moderate pain (4 - 6).      amLODIPine (Norvasc) 5 mg tablet Take 1 tablet (5 mg) by mouth once daily. 90 tablet 3    chlorhexidine (Peridex) 0.12 % solution Use as directed 473 mL 0    chlorthalidone (Hygroton) 25 mg tablet Take 1 tablet (25 mg) by mouth once daily. 90 tablet 3    cyclobenzaprine (Flexeril) 10 mg tablet Take 1 tablet (10 mg) by mouth once daily at bedtime. 90 tablet 11    diclofenac sodium 1 % kit Apply topically once daily. Apply sparingly to affected area(s)      ferrous sulfate 325 (65 Fe) MG tablet Take 1 tablet (325 mg) by mouth every other day. Without food to increase absorption       multivitamin with minerals tablet Take 1 tablet by mouth once daily.       No current facility-administered medications for this visit.       Review of Systems   Constitutional: Negative.    HENT: Negative.     Eyes: Negative.    Respiratory: Negative.     Cardiovascular: Negative.    Gastrointestinal: Negative.    Endocrine: Negative.    Genitourinary:  Positive for frequency.   Musculoskeletal: Negative.    Skin: Negative.    Allergic/Immunologic: Negative.    Neurological: Negative.    Hematological: Negative.    Psychiatric/Behavioral: Negative.                Physical Exam  Vitals reviewed.   Constitutional:       Appearance: Normal appearance. She is obese.   HENT:      Head: Normocephalic and atraumatic.      Nose: Nose normal.      Mouth/Throat:      Mouth: Mucous membranes are moist.      Pharynx: Oropharynx is clear.   Eyes:      Extraocular Movements: Extraocular movements intact.      Conjunctiva/sclera: Conjunctivae normal.      Pupils: Pupils are equal, round, and reactive to light.   Cardiovascular:      Rate and Rhythm: Normal rate and regular rhythm.      Pulses: Normal pulses.      Heart sounds: Normal heart sounds.   Pulmonary:      Effort: Pulmonary effort is normal.      Breath sounds: Normal breath sounds.   Abdominal:      General: Bowel sounds are normal.      Palpations: Abdomen is soft.   Genitourinary:     Comments: Assessment deferred to physician  Musculoskeletal:         General: Normal range of motion.      Cervical back: Normal range of motion and neck supple.   Skin:     General: Skin is warm and dry.   Neurological:      General: No focal deficit present.      Mental Status: She is alert and oriented to person, place, and time.   Psychiatric:         Mood and Affect: Mood normal.         Behavior: Behavior normal.         Thought Content: Thought content normal.         Judgment: Judgment normal.          PAT AIRWAY:   Airway:     Mallampati::  III    TM distance::  >3 FB    Neck  "ROM::  Full  normal            Visit Vitals  BP (!) 141/99   Pulse 77   Temp 36.3 °C (97.3 °F)   Resp 16   Ht 1.6 m (5' 3\")   Wt 116 kg (254 lb 11.2 oz)   LMP 2025   SpO2 98%   BMI 45.12 kg/m²   OB Status Having periods   Smoking Status Never   BSA 2.27 m²     ASA: III  CHADS2: 2.8%  RCRI: 0.4%  DASI: 50.7  METS:9  STOP BAN        Assessment and Plan:     Morbid obesity BMI  45.12: robotic bariatric sleeve   Hypertension: Amlodipine, Chlorthalidone  Spinal Stenosis: Flexeril  Sleep Apnea: no C-PAP  Urinary freq/UTI: Macrobid  Anxiety: Cymbalta    LABS: MRSA collected in PAT, LABS ordered by Surgeon  EKG 25  TTE 24  CONCLUSIONS:   1. Poorly visualized anatomical structures due to suboptimal image quality.   2. Left ventricular ejection fraction is normal, calculated by Khanna's biplane at 69%.   3. RVSP within normal limits.    Cardiac Clearance Dr. Pedro Pablo Barron 24  39 y.o. female, with history significant for HTN, IRVIN, GERD, and BMI 42.8, who visits Cardiology today as a new patient  for preop clearance. Her hypertension is not well controlled on diuretics as a single medication and her EKG is abnormal, with biphasic T waves in the anterior leads.     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    Chondrocostal junction syndrome (tietze) 2015    Costochondritis    Encounter for "  screening for Streptococcus B      screening for streptococcus B    Encounter for routine checking of intrauterine contraceptive device 2016    Intrauterine device surveillance    Nocturia 2016    Nocturia    Obesity complicating pregnancy, unspecified trimester ([facility]) 2017    Obesity in pregnancy    Other specified abnormal immunological findings in serum 2015    Positive Helicobacter pylori serology    Other specified personal risk factors, not elsewhere classified     At risk of UTI    Personal history of other diseases of the musculoskeletal system and connective tissue 2015    History of chronic back pain    Personal history of other endocrine, nutritional and metabolic disease 10/29/2013    History of vitamin D deficiency    Personal history of other specified conditions 2014    History of insomnia    Personal history of other specified conditions 10/24/2013    History of urinary frequency    Personal history of other specified conditions 2018    History of dysuria    Retention of urine, unspecified 2020    Incomplete emptying of bladder    Sleep apnea     Spinal stenosis     Unspecified pre-existing hypertension complicating pregnancy, unspecified trimester ([facility]) 2017    Chronic hypertension in pregnancy    Urge incontinence     Sensory urge incontinence   [2]   Past Surgical History:  Procedure Laterality Date    CHOLECYSTECTOMY  2013    Cholecystectomy Laparoscopic    TUBAL LIGATION     [3]   Family History  Problem Relation Name Age of Onset    Hypertension Mother Grandmother     Cancer Mother Grandmother     Diabetes Mother Grandmother     Hypertension Father Regulo     Arthritis Father Regulo     Cancer Paternal Grandmother Gabby    [4]   Allergies  Allergen Reactions    Iodinated Contrast Media Unknown

## 2025-05-07 NOTE — PREPROCEDURE INSTRUCTIONS
Medication List            Accurate as of May 7, 2025  4:13 PM. Always use your most recent med list.                acetaminophen 500 mg tablet  Commonly known as: Tylenol  Medication Adjustments for Surgery: Take/Use as prescribed     amLODIPine 5 mg tablet  Commonly known as: Norvasc  Take 1 tablet (5 mg) by mouth once daily.  Medication Adjustments for Surgery: Take on the morning of surgery     chlorthalidone 25 mg tablet  Commonly known as: Hygroton  Take 1 tablet (25 mg) by mouth once daily.  Medication Adjustments for Surgery: Take on the morning of surgery     cyclobenzaprine 10 mg tablet  Commonly known as: Flexeril  Take 1 tablet (10 mg) by mouth once daily at bedtime.  Medication Adjustments for Surgery: Do Not take on the morning of surgery     diclofenac sodium 1 % kit  Additional Medication Adjustments for Surgery: Take last dose 7 days before surgery     DULoxetine 30 mg DR capsule  Commonly known as: Cymbalta  Take 1 capsule (30 mg) by mouth once daily. Do not crush or chew.  Medication Adjustments for Surgery: Take/Use as prescribed  Notes to patient: PATIENT IS NOT TAKING     ferrous sulfate 325 mg (65 mg elemental) tablet  Medication Adjustments for Surgery: Take/Use as prescribed     multivitamin with minerals tablet  Additional Medication Adjustments for Surgery: Take last dose 7 days before surgery     nitrofurantoin (macrocrystal-monohydrate) 100 mg capsule  Commonly known as: Macrobid  Medication Adjustments for Surgery: Take/Use as prescribed                              NPO Instructions:  FOLLOW DR. YOUSIF'S BARIATRIC INSTRUCTIONS REGARDING EATING AND DRINKING BEFORE SURGERY      Additional Instructions:     Day of Surgery:  Review your medication instructions, take indicated medications  Wear  comfortable loose fitting clothing  Do not use moisturizers, creams, lotions or perfume  All jewelry and valuables should be left at home      Patient Information: Pre-Operative Infection  Prevention Measures     Why did I have my nose, under my arms, and groin swabbed?  The purpose of the swab is to identify Staphylococcus aureus inside your nose or on your skin.  The swab was sent to the laboratory for culture.  A positive swab/culture for Staphylococcus aureus is called colonization or carriage.      What is Staphylococcus aureus?  Staphylococcus aureus, also known as “staph”, is a germ found on the skin or in the nose of healthy people.  Sometimes Staphylococcus aureus can get into the body and cause an infection.  This can be minor (such as pimples, boils, or other skin problems).  It might also be serious (such as a blood infection, pneumonia, or a surgical site infection).    What is Staphylococcus aureus colonization or carriage?  Colonization or carriage means that a person has the germ but is not sick from it.  These bacteria can be spread on the hands or when breathing or sneezing.    How is Staphylococcus aureus spread?  It is most often spread by close contact with a person or item that carries it.    What happens if my culture is positive for Staphylococcus aureus?  Your doctor/medical team will use this information to guide any antibiotic treatment which may be necessary.  Regardless of the culture results, we will clean the inside of your nose with a betadine swab just before you have your surgery.      Will I get an infection if I have Staphylococcus aureus in my nose or on my skin?  Anyone can get an infection with Staphylococcus aureus.  However, the best way to reduce your risk of infection is to follow the instructions provided to you for the use of your CHG soap and dental rinse.        Patient Information: Oral/Dental Rinse    What is oral/dental rinse?   It is a mouthwash. It is a way of cleaning the mouth with a germ-killing solution before your surgery.  The solution contains chlorhexidine, commonly known as CHG.   It is used inside the mouth to kill a bacteria known as  Staphylococcus aureus.  Let your doctor know if you are allergic to Chlorhexidine.    Why do I need to use CHG oral/dental rinse?  The CHG oral/dental rinse helps to kill a bacteria in your mouth known as Staphylococcus aureus.     This reduces the risk of infection at the surgical site.      Using your CHG oral/dental rinse  AT YOUR PHARMACY  STEPS:  Use your CHG oral/dental rinse after you brush your teeth the night before (at bedtime) and the morning of your surgery.  Follow all directions on your prescription label.    Use the cap on the container to measure 15ml   Swish (gargle if you can) the mouthwash in your mouth for at least 30 seconds, (do not swallow) and spit out  After you use your CHG rinse, do not rinse your mouth with water, drink or eat.  Please refer to the prescription label for the appropriate time to resume oral intake      What side effects might I have using the CHG oral/dental rinse?  CHG rinse will stick to plaque on the teeth.  Brush and floss just before use.  Teeth brushing will help avoid staining of plaque during use.      Patient Information: Home Preoperative Antibacterial Shower      What is a home preoperative antibacterial shower?  This shower is a way of cleaning the skin with a germ-killing solution before surgery.  The solution contains chlorhexidine, commonly known as CHG.  CHG is a skin cleanser with germ-killing ability.  Let your doctor know if you are allergic to chlorhexidine.    Why do I need to take a preoperative antibacterial shower?  Skin is not sterile.  It is best to try to make your skin as free of germs as possible before surgery.  Proper cleansing with a germ-killing soap before surgery can lower the number of germs on your skin.  This helps to reduce the risk of infection at the surgical site.  Following the instructions listed below will help you prepare your skin for surgery.      How do I use the solution?  Steps:  Begin using your CHG MAY 10  ________________________.    First, wash and rinse your hair using the CHG soap. Keep CHG soap away from ear canals and eyes.  Rinse completely, do not condition.  Hair extensions should be removed.  Wash your face with your normal soap and rinse.    Apply the CHG solution to a clean wet washcloth.  Turn the water off or move away from the water spray to avoid premature rinsing of the CHG soap as you are applying.   Firmly lather your entire body from the neck down.  Do not use on your face.  Pay special attention to the area(s) where your incision(s) will be located unless they are on your face.  Avoid scrubbing your skin too hard.  The important point is to have the CHG soap sit on your skin for 3 minutes.    When the 3 minutes are up, turn on the water and rinse the CHG solution off your body completely.   DO NOT wash with regular soap after you have used the CHG soap solution  Pat yourself dry with a clean, freshly-laundered towel.  DO NOT apply powders, deodorants, or lotions.  Dress in clean, freshly laundered nightclothes.    Be sure to sleep with clean, freshly laundered sheets.  Be aware that CHG will cause stains on fabrics; if you wash them with bleach after use.  Rinse your washcloth and other linens that have contact with CHG completely.  Use only non-chlorine detergents to launder the items used.   The morning of surgery is the fifth day.  Repeat the above steps and dress in clean comfortable clothing     Whom should I contact if I have any questions regarding the use of CHG soap?  Call the [facility] at [phone] if you have any questions.             PAT DISCHARGE INSTRUCTIONS    Please call the Same Day Surgery (SDS) Department of the hospital where your procedure will be performed after 2:00 PM the day before your surgery. If you are scheduled on a Monday, or a Tuesday following a Monday holiday, you will need to call on the  last business day prior to your surgery.        [facility]  [address], [address] [phone]  Second Floor        Please let your surgeon know if:      You develop any open sores, shingles, burning or painful urination as these may increase your risk of an infection.   You no longer wish to have the surgery.   Any other personal circumstances change that may lead to the need to cancel or defer this surgery-such as being sick or getting admitted to any hospital within one week of your planned procedure.    Your contact details change, such as a change of address or phone number.    Starting now:     Please DO NOT drink alcohol or smoke for 24 hours before surgery. It is well known that quitting smoking can make a huge difference to your health and recovery from surgery. The longer you abstain from smoking, the better your chances of a healthy recovery. If you need help with quitting, call 9-547-QUIT-NOW to be connected to a trained counselor who will discuss the best methods to help you quit.     Before your surgery:    Please stop all supplements 7 days prior to surgery. Or as directed by your surgeon.   Please stop taking NSAID pain medicine such as Advil and Motrin 7 days before surgery.    If you develop any fever, cough, cold, rashes, cuts, scratches, scrapes, urinary symptoms or infection anywhere on your body (including teeth and gums) prior to surgery, please call your surgeon’s office as soon as possible. This may require treatment to reduce the chance of cancellation on the day of surgery.    The day before your surgery:   DIET- Please follow the diet instructions at the top of your packet.   Get a good night’s rest.  Use the special soap for bathing if you have been instructed to use one.    Scheduled surgery times may change and you will be notified if this occurs - please check your personal voicemail for any updates.     On the morning of  surgery:   Wear comfortable, loose fitting clothes which open in the front. Please do not wear moisturizers, creams, lotions, makeup or perfume.    Please bring with you to surgery:   Photo ID and insurance card   Current list of medicines and allergies   Pacemaker/ Defibrillator/Heart stent cards   CPAP machine and mask    Slings/ splints/ crutches   A copy of your complete advanced directive/DHPOA.    Please do NOT bring with you to surgery:   All jewelry and valuables should be left at home.   Prosthetic devices such as contact lenses, hearing aids, dentures, eyelash extensions, hairpins and body piercings must be removed prior to going in to the surgical suite.    After outpatient surgery:   A responsible adult MUST accompany you at the time of discharge and stay with you for 24 hours after your surgery. You may NOT drive yourself home after surgery.    Do not drive, operate machinery, make critical decisions or do activities that require co-ordination or balance until after a night’s sleep.   Do not drink alcoholic beverages for 24 hours.   Instructions for resuming your medications will be provided by your surgeon.    CALL YOUR DOCTOR AFTER SURGERY IF YOU HAVE:     Chills and/or a fever of 101° F or higher.    Redness, swelling, pus or drainage from your surgical wound or a bad smell from the wound.    Lightheadedness, fainting or confusion.    Persistent vomiting (throwing up) and are not able to eat or drink for 12 hours.    Three or more loose, watery bowel movements in 24 hours (diarrhea).   Difficulty or pain while urinating( after non-urological surgery)    Pain and swelling in your legs, especially if it is only on one side.    Difficulty breathing or are breathing faster than normal.    Any new concerning symptoms.

## 2025-05-07 NOTE — CPM/PAT H&P
CPM/PAT Evaluation       Name: Анна Bird (Анна Bird)  /Age: 1985/40 y.o.     In-Person       Chief Complaint: Bariatric Sleeve    HPI: Анна Bird is a 40 year old female who has a history of hypertension, spinal stenosis, and sleep apnea. She is scheduled for a bariatric sleeve surgery.  Per Dr. Camacho's note 3/31/25 she has completed all pre op clearances. She denies fever, chills, nausea, vomiting, chest pain, sob, dizziness, and palpitations.     Medical History[1]    Surgical History[2]    Social History     Tobacco Use    Smoking status: Never     Passive exposure: Never    Smokeless tobacco: Never   Substance Use Topics    Alcohol use: Yes     Alcohol/week: 2.0 standard drinks of alcohol     Types: 2 Glasses of wine per week     Comment: casually     Social History     Substance and Sexual Activity   Drug Use Never       Family History[3]    Allergies[4]  Current Outpatient Medications   Medication Sig Dispense Refill    DULoxetine (Cymbalta) 30 mg DR capsule Take 1 capsule (30 mg) by mouth once daily. Do not crush or chew. 90 capsule 1    nitrofurantoin, macrocrystal-monohydrate, (Macrobid) 100 mg capsule Take 1 capsule (100 mg) by mouth once daily.      acetaminophen (Tylenol) 500 mg tablet Take 2 tablets (1,000 mg) by mouth every 8 hours if needed for mild pain (1 - 3) or moderate pain (4 - 6).      amLODIPine (Norvasc) 5 mg tablet Take 1 tablet (5 mg) by mouth once daily. 90 tablet 3    chlorhexidine (Peridex) 0.12 % solution Use as directed 473 mL 0    chlorthalidone (Hygroton) 25 mg tablet Take 1 tablet (25 mg) by mouth once daily. 90 tablet 3    cyclobenzaprine (Flexeril) 10 mg tablet Take 1 tablet (10 mg) by mouth once daily at bedtime. 90 tablet 11    diclofenac sodium 1 % kit Apply topically once daily. Apply sparingly to affected area(s)      ferrous sulfate 325 (65 Fe) MG tablet Take 1 tablet (325 mg) by mouth every other day. Without food to increase absorption       multivitamin with minerals tablet Take 1 tablet by mouth once daily.       No current facility-administered medications for this visit.       Review of Systems   Constitutional: Negative.    HENT: Negative.     Eyes: Negative.    Respiratory: Negative.     Cardiovascular: Negative.    Gastrointestinal: Negative.    Endocrine: Negative.    Genitourinary:  Positive for frequency.   Musculoskeletal: Negative.    Skin: Negative.    Allergic/Immunologic: Negative.    Neurological: Negative.    Hematological: Negative.    Psychiatric/Behavioral: Negative.                Physical Exam  Vitals reviewed.   Constitutional:       Appearance: Normal appearance. She is obese.   HENT:      Head: Normocephalic and atraumatic.      Nose: Nose normal.      Mouth/Throat:      Mouth: Mucous membranes are moist.      Pharynx: Oropharynx is clear.   Eyes:      Extraocular Movements: Extraocular movements intact.      Conjunctiva/sclera: Conjunctivae normal.      Pupils: Pupils are equal, round, and reactive to light.   Cardiovascular:      Rate and Rhythm: Normal rate and regular rhythm.      Pulses: Normal pulses.      Heart sounds: Normal heart sounds.   Pulmonary:      Effort: Pulmonary effort is normal.      Breath sounds: Normal breath sounds.   Abdominal:      General: Bowel sounds are normal.      Palpations: Abdomen is soft.   Genitourinary:     Comments: Assessment deferred to physician  Musculoskeletal:         General: Normal range of motion.      Cervical back: Normal range of motion and neck supple.   Skin:     General: Skin is warm and dry.   Neurological:      General: No focal deficit present.      Mental Status: She is alert and oriented to person, place, and time.   Psychiatric:         Mood and Affect: Mood normal.         Behavior: Behavior normal.         Thought Content: Thought content normal.         Judgment: Judgment normal.          PAT AIRWAY:   Airway:     Mallampati::  III    TM distance::  >3 FB    Neck  "ROM::  Full  normal            Visit Vitals  BP (!) 141/99   Pulse 77   Temp 36.3 °C (97.3 °F)   Resp 16   Ht 1.6 m (5' 3\")   Wt 116 kg (254 lb 11.2 oz)   LMP 2025   SpO2 98%   BMI 45.12 kg/m²   OB Status Having periods   Smoking Status Never   BSA 2.27 m²     ASA: III  CHADS2: 2.8%  RCRI: 0.4%  DASI: 50.7  METS:9  STOP BAN        Assessment and Plan:     Morbid obesity BMI  45.12: robotic bariatric sleeve   Hypertension: Amlodipine, Chlorthalidone  Spinal Stenosis: Flexeril  Sleep Apnea: no C-PAP  Urinary freq/UTI: Macrobid  Anxiety: Cymbalta    LABS: MRSA collected in PAT, LABS ordered by Surgeon  EKG 25  TTE 24  CONCLUSIONS:   1. Poorly visualized anatomical structures due to suboptimal image quality.   2. Left ventricular ejection fraction is normal, calculated by Khanna's biplane at 69%.   3. RVSP within normal limits.    Cardiac Clearance Dr. Pedro Pablo Barron 24  39 y.o. female, with history significant for HTN, IRVIN, GERD, and BMI 42.8, who visits Cardiology today as a new patient  for preop clearance. Her hypertension is not well controlled on diuretics as a single medication and her EKG is abnormal, with biphasic T waves in the anterior leads.     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    Chondrocostal junction syndrome (tietze) 2015    Costochondritis    Encounter for "  screening for Streptococcus B      screening for streptococcus B    Encounter for routine checking of intrauterine contraceptive device 2016    Intrauterine device surveillance    Nocturia 2016    Nocturia    Obesity complicating pregnancy, unspecified trimester ([facility] 2017    Obesity in pregnancy    Other specified abnormal immunological findings in serum 2015    Positive Helicobacter pylori serology    Other specified personal risk factors, not elsewhere classified     At risk of UTI    Personal history of other diseases of the musculoskeletal system and connective tissue 2015    History of chronic back pain    Personal history of other endocrine, nutritional and metabolic disease 10/29/2013    History of vitamin D deficiency    Personal history of other specified conditions 2014    History of insomnia    Personal history of other specified conditions 10/24/2013    History of urinary frequency    Personal history of other specified conditions 2018    History of dysuria    Retention of urine, unspecified 2020    Incomplete emptying of bladder    Sleep apnea     Spinal stenosis     Unspecified pre-existing hypertension complicating pregnancy, unspecified trimester ([facility]) 2017    Chronic hypertension in pregnancy    Urge incontinence     Sensory urge incontinence   [2]   Past Surgical History:  Procedure Laterality Date    CHOLECYSTECTOMY  2013    Cholecystectomy Laparoscopic    TUBAL LIGATION     [3]   Family History  Problem Relation Name Age of Onset    Hypertension Mother Grandmother     Cancer Mother Grandmother     Diabetes Mother Grandmother     Hypertension Father Regulo     Arthritis Father Regulo     Cancer Paternal Grandmother Gabby    [4]   Allergies  Allergen Reactions    Iodinated Contrast Media Unknown

## 2025-05-09 LAB — STAPHYLOCOCCUS SPEC CULT: NORMAL

## 2025-05-13 PROBLEM — E66.01 MORBID OBESITY WITH BMI OF 45.0-49.9, ADULT (MULTI): Status: ACTIVE | Noted: 2025-05-13

## 2025-05-14 ENCOUNTER — ANESTHESIA (OUTPATIENT)
Dept: OPERATING ROOM | Facility: HOSPITAL | Age: 40
End: 2025-05-14
Payer: COMMERCIAL

## 2025-05-14 ENCOUNTER — HOSPITAL ENCOUNTER (OUTPATIENT)
Facility: HOSPITAL | Age: 40
LOS: 1 days | Discharge: HOME | End: 2025-05-16
Attending: SURGERY | Admitting: SURGERY
Payer: COMMERCIAL

## 2025-05-14 ENCOUNTER — ANESTHESIA EVENT (OUTPATIENT)
Dept: OPERATING ROOM | Facility: HOSPITAL | Age: 40
End: 2025-05-14
Payer: COMMERCIAL

## 2025-05-14 DIAGNOSIS — E66.01 MORBID OBESITY (MULTI): ICD-10-CM

## 2025-05-14 DIAGNOSIS — E66.01 MORBID OBESITY WITH BMI OF 45.0-49.9, ADULT (MULTI): Primary | ICD-10-CM

## 2025-05-14 DIAGNOSIS — E66.01 MORBID OBESITY DUE TO EXCESS CALORIES (MULTI): ICD-10-CM

## 2025-05-14 LAB — PREGNANCY TEST URINE, POC: NEGATIVE

## 2025-05-14 PROCEDURE — 2500000005 HC RX 250 GENERAL PHARMACY W/O HCPCS: Performed by: STUDENT IN AN ORGANIZED HEALTH CARE EDUCATION/TRAINING PROGRAM

## 2025-05-14 PROCEDURE — 2500000004 HC RX 250 GENERAL PHARMACY W/ HCPCS (ALT 636 FOR OP/ED): Mod: JZ | Performed by: SURGERY

## 2025-05-14 PROCEDURE — 7100000011 HC EXTENDED STAY RECOVERY HOURLY - NURSING UNIT

## 2025-05-14 PROCEDURE — 88307 TISSUE EXAM BY PATHOLOGIST: CPT | Performed by: PATHOLOGY

## 2025-05-14 PROCEDURE — 2500000004 HC RX 250 GENERAL PHARMACY W/ HCPCS (ALT 636 FOR OP/ED)

## 2025-05-14 PROCEDURE — 2500000002 HC RX 250 W HCPCS SELF ADMINISTERED DRUGS (ALT 637 FOR MEDICARE OP, ALT 636 FOR OP/ED): Performed by: SURGERY

## 2025-05-14 PROCEDURE — 43281 LAP PARAESOPHAG HERN REPAIR: CPT | Performed by: SURGERY

## 2025-05-14 PROCEDURE — 3600000017 HC OR TIME - EACH INCREMENTAL 1 MINUTE - PROCEDURE LEVEL SIX: Performed by: SURGERY

## 2025-05-14 PROCEDURE — 0754T DGTZ GLS MCRSCP SLD LEVEL V: CPT | Mod: TC,WESLAB | Performed by: SURGERY

## 2025-05-14 PROCEDURE — 3600000018 HC OR TIME - INITIAL BASE CHARGE - PROCEDURE LEVEL SIX: Performed by: SURGERY

## 2025-05-14 PROCEDURE — A43775 PR LAP, GAST RESTRICT PROC, LONGITUDINAL GASTRECTOMY

## 2025-05-14 PROCEDURE — 7100000002 HC RECOVERY ROOM TIME - EACH INCREMENTAL 1 MINUTE: Performed by: SURGERY

## 2025-05-14 PROCEDURE — 2720000007 HC OR 272 NO HCPCS: Performed by: SURGERY

## 2025-05-14 PROCEDURE — 2500000004 HC RX 250 GENERAL PHARMACY W/ HCPCS (ALT 636 FOR OP/ED): Mod: JZ | Performed by: STUDENT IN AN ORGANIZED HEALTH CARE EDUCATION/TRAINING PROGRAM

## 2025-05-14 PROCEDURE — 7100000001 HC RECOVERY ROOM TIME - INITIAL BASE CHARGE: Performed by: SURGERY

## 2025-05-14 PROCEDURE — 2780000003 HC OR 278 NO HCPCS: Performed by: SURGERY

## 2025-05-14 PROCEDURE — 3700000002 HC GENERAL ANESTHESIA TIME - EACH INCREMENTAL 1 MINUTE: Performed by: SURGERY

## 2025-05-14 PROCEDURE — S2900 ROBOTIC SURGICAL SYSTEM: HCPCS | Performed by: SURGERY

## 2025-05-14 PROCEDURE — 88342 IMHCHEM/IMCYTCHM 1ST ANTB: CPT | Performed by: PATHOLOGY

## 2025-05-14 PROCEDURE — 2500000001 HC RX 250 WO HCPCS SELF ADMINISTERED DRUGS (ALT 637 FOR MEDICARE OP): Performed by: SURGERY

## 2025-05-14 PROCEDURE — 3700000001 HC GENERAL ANESTHESIA TIME - INITIAL BASE CHARGE: Performed by: SURGERY

## 2025-05-14 PROCEDURE — A43775 PR LAP, GAST RESTRICT PROC, LONGITUDINAL GASTRECTOMY: Performed by: STUDENT IN AN ORGANIZED HEALTH CARE EDUCATION/TRAINING PROGRAM

## 2025-05-14 PROCEDURE — 43775 LAP SLEEVE GASTRECTOMY: CPT | Performed by: SURGERY

## 2025-05-14 PROCEDURE — 81025 URINE PREGNANCY TEST: CPT | Performed by: SURGERY

## 2025-05-14 RX ORDER — HYDRALAZINE HYDROCHLORIDE 20 MG/ML
5 INJECTION INTRAMUSCULAR; INTRAVENOUS EVERY 4 HOURS PRN
Status: DISCONTINUED | OUTPATIENT
Start: 2025-05-14 | End: 2025-05-16 | Stop reason: HOSPADM

## 2025-05-14 RX ORDER — LIDOCAINE HYDROCHLORIDE 10 MG/ML
INJECTION, SOLUTION INFILTRATION; PERINEURAL AS NEEDED
Status: DISCONTINUED | OUTPATIENT
Start: 2025-05-14 | End: 2025-05-14 | Stop reason: HOSPADM

## 2025-05-14 RX ORDER — HEPARIN SODIUM 5000 [USP'U]/ML
7500 INJECTION, SOLUTION INTRAVENOUS; SUBCUTANEOUS EVERY 8 HOURS SCHEDULED
Status: DISCONTINUED | OUTPATIENT
Start: 2025-05-15 | End: 2025-05-16 | Stop reason: HOSPADM

## 2025-05-14 RX ORDER — OXYCODONE HCL 5 MG/5 ML
5 SOLUTION, ORAL ORAL EVERY 6 HOURS PRN
Status: DISCONTINUED | OUTPATIENT
Start: 2025-05-14 | End: 2025-05-16 | Stop reason: HOSPADM

## 2025-05-14 RX ORDER — DOCUSATE SODIUM 50 MG/5ML
100 LIQUID ORAL 2 TIMES DAILY
Status: DISCONTINUED | OUTPATIENT
Start: 2025-05-14 | End: 2025-05-16 | Stop reason: HOSPADM

## 2025-05-14 RX ORDER — AMLODIPINE BESYLATE 5 MG/1
5 TABLET ORAL DAILY
Status: DISCONTINUED | OUTPATIENT
Start: 2025-05-14 | End: 2025-05-16 | Stop reason: HOSPADM

## 2025-05-14 RX ORDER — HEPARIN SODIUM 5000 [USP'U]/ML
5000 INJECTION, SOLUTION INTRAVENOUS; SUBCUTANEOUS ONCE
Status: COMPLETED | OUTPATIENT
Start: 2025-05-14 | End: 2025-05-14

## 2025-05-14 RX ORDER — CEFAZOLIN SODIUM 2 G/50ML
2 SOLUTION INTRAVENOUS ONCE
Status: COMPLETED | OUTPATIENT
Start: 2025-05-14 | End: 2025-05-14

## 2025-05-14 RX ORDER — BUPIVACAINE HYDROCHLORIDE 5 MG/ML
INJECTION, SOLUTION EPIDURAL; INTRACAUDAL; PERINEURAL AS NEEDED
Status: DISCONTINUED | OUTPATIENT
Start: 2025-05-14 | End: 2025-05-14 | Stop reason: HOSPADM

## 2025-05-14 RX ORDER — FENTANYL CITRATE 50 UG/ML
50 INJECTION, SOLUTION INTRAMUSCULAR; INTRAVENOUS EVERY 5 MIN PRN
Status: DISCONTINUED | OUTPATIENT
Start: 2025-05-14 | End: 2025-05-14 | Stop reason: HOSPADM

## 2025-05-14 RX ORDER — SIMETHICONE 80 MG
80 TABLET,CHEWABLE ORAL 4 TIMES DAILY PRN
Status: DISCONTINUED | OUTPATIENT
Start: 2025-05-14 | End: 2025-05-16 | Stop reason: HOSPADM

## 2025-05-14 RX ORDER — PROCHLORPERAZINE 25 MG/1
25 SUPPOSITORY RECTAL EVERY 12 HOURS PRN
Status: DISCONTINUED | OUTPATIENT
Start: 2025-05-14 | End: 2025-05-16 | Stop reason: HOSPADM

## 2025-05-14 RX ORDER — LABETALOL HYDROCHLORIDE 5 MG/ML
5 INJECTION, SOLUTION INTRAVENOUS EVERY 5 MIN PRN
Status: DISCONTINUED | OUTPATIENT
Start: 2025-05-14 | End: 2025-05-14 | Stop reason: HOSPADM

## 2025-05-14 RX ORDER — ONDANSETRON HYDROCHLORIDE 2 MG/ML
4 INJECTION, SOLUTION INTRAVENOUS ONCE AS NEEDED
Status: DISCONTINUED | OUTPATIENT
Start: 2025-05-14 | End: 2025-05-14 | Stop reason: HOSPADM

## 2025-05-14 RX ORDER — ONDANSETRON 4 MG/1
4 TABLET, ORALLY DISINTEGRATING ORAL EVERY 8 HOURS PRN
Status: DISCONTINUED | OUTPATIENT
Start: 2025-05-14 | End: 2025-05-16 | Stop reason: HOSPADM

## 2025-05-14 RX ORDER — ONDANSETRON HYDROCHLORIDE 2 MG/ML
4 INJECTION, SOLUTION INTRAVENOUS EVERY 8 HOURS PRN
Status: DISCONTINUED | OUTPATIENT
Start: 2025-05-14 | End: 2025-05-16 | Stop reason: HOSPADM

## 2025-05-14 RX ORDER — CYCLOBENZAPRINE HCL 10 MG
10 TABLET ORAL NIGHTLY
Status: DISCONTINUED | OUTPATIENT
Start: 2025-05-14 | End: 2025-05-16 | Stop reason: HOSPADM

## 2025-05-14 RX ORDER — ONDANSETRON HYDROCHLORIDE 2 MG/ML
INJECTION, SOLUTION INTRAVENOUS AS NEEDED
Status: DISCONTINUED | OUTPATIENT
Start: 2025-05-14 | End: 2025-05-14

## 2025-05-14 RX ORDER — HYDROMORPHONE HYDROCHLORIDE 0.2 MG/ML
0.2 INJECTION INTRAMUSCULAR; INTRAVENOUS; SUBCUTANEOUS EVERY 5 MIN PRN
Status: DISCONTINUED | OUTPATIENT
Start: 2025-05-14 | End: 2025-05-14 | Stop reason: HOSPADM

## 2025-05-14 RX ORDER — PROPOFOL 10 MG/ML
INJECTION, EMULSION INTRAVENOUS AS NEEDED
Status: DISCONTINUED | OUTPATIENT
Start: 2025-05-14 | End: 2025-05-14

## 2025-05-14 RX ORDER — SCOPOLAMINE 1 MG/3D
1 PATCH, EXTENDED RELEASE TRANSDERMAL ONCE
Status: DISCONTINUED | OUTPATIENT
Start: 2025-05-14 | End: 2025-05-14

## 2025-05-14 RX ORDER — MEPERIDINE HYDROCHLORIDE 25 MG/ML
12.5 INJECTION INTRAMUSCULAR; INTRAVENOUS; SUBCUTANEOUS EVERY 10 MIN PRN
Status: DISCONTINUED | OUTPATIENT
Start: 2025-05-14 | End: 2025-05-14 | Stop reason: HOSPADM

## 2025-05-14 RX ORDER — MORPHINE SULFATE 2 MG/ML
2 INJECTION, SOLUTION INTRAMUSCULAR; INTRAVENOUS EVERY 4 HOURS PRN
Status: DISCONTINUED | OUTPATIENT
Start: 2025-05-14 | End: 2025-05-16 | Stop reason: HOSPADM

## 2025-05-14 RX ORDER — MIDAZOLAM HYDROCHLORIDE 1 MG/ML
INJECTION, SOLUTION INTRAMUSCULAR; INTRAVENOUS AS NEEDED
Status: DISCONTINUED | OUTPATIENT
Start: 2025-05-14 | End: 2025-05-14

## 2025-05-14 RX ORDER — ROCURONIUM BROMIDE 10 MG/ML
INJECTION, SOLUTION INTRAVENOUS AS NEEDED
Status: DISCONTINUED | OUTPATIENT
Start: 2025-05-14 | End: 2025-05-14

## 2025-05-14 RX ORDER — PHENYLEPHRINE HCL IN 0.9% NACL 1 MG/10 ML
SYRINGE (ML) INTRAVENOUS AS NEEDED
Status: DISCONTINUED | OUTPATIENT
Start: 2025-05-14 | End: 2025-05-14

## 2025-05-14 RX ORDER — PROCHLORPERAZINE EDISYLATE 5 MG/ML
10 INJECTION INTRAMUSCULAR; INTRAVENOUS EVERY 6 HOURS PRN
Status: DISCONTINUED | OUTPATIENT
Start: 2025-05-14 | End: 2025-05-16 | Stop reason: HOSPADM

## 2025-05-14 RX ORDER — ALBUTEROL SULFATE 0.83 MG/ML
2.5 SOLUTION RESPIRATORY (INHALATION) EVERY 30 MIN PRN
Status: DISCONTINUED | OUTPATIENT
Start: 2025-05-14 | End: 2025-05-14 | Stop reason: HOSPADM

## 2025-05-14 RX ORDER — LIDOCAINE HYDROCHLORIDE 20 MG/ML
INJECTION, SOLUTION INFILTRATION; PERINEURAL AS NEEDED
Status: DISCONTINUED | OUTPATIENT
Start: 2025-05-14 | End: 2025-05-14

## 2025-05-14 RX ORDER — SODIUM CHLORIDE, SODIUM LACTATE, POTASSIUM CHLORIDE, CALCIUM CHLORIDE 600; 310; 30; 20 MG/100ML; MG/100ML; MG/100ML; MG/100ML
40 INJECTION, SOLUTION INTRAVENOUS CONTINUOUS
Status: DISCONTINUED | OUTPATIENT
Start: 2025-05-14 | End: 2025-05-14 | Stop reason: HOSPADM

## 2025-05-14 RX ORDER — FENTANYL CITRATE 50 UG/ML
INJECTION, SOLUTION INTRAMUSCULAR; INTRAVENOUS AS NEEDED
Status: DISCONTINUED | OUTPATIENT
Start: 2025-05-14 | End: 2025-05-14

## 2025-05-14 RX ORDER — SODIUM CHLORIDE, SODIUM LACTATE, POTASSIUM CHLORIDE, CALCIUM CHLORIDE 600; 310; 30; 20 MG/100ML; MG/100ML; MG/100ML; MG/100ML
125 INJECTION, SOLUTION INTRAVENOUS CONTINUOUS
Status: DISCONTINUED | OUTPATIENT
Start: 2025-05-14 | End: 2025-05-16

## 2025-05-14 RX ORDER — NALOXONE HYDROCHLORIDE 0.4 MG/ML
0.2 INJECTION, SOLUTION INTRAMUSCULAR; INTRAVENOUS; SUBCUTANEOUS EVERY 5 MIN PRN
Status: DISCONTINUED | OUTPATIENT
Start: 2025-05-14 | End: 2025-05-16 | Stop reason: HOSPADM

## 2025-05-14 RX ORDER — IPRATROPIUM BROMIDE 0.5 MG/2.5ML
500 SOLUTION RESPIRATORY (INHALATION) EVERY 30 MIN PRN
Status: DISCONTINUED | OUTPATIENT
Start: 2025-05-14 | End: 2025-05-14 | Stop reason: HOSPADM

## 2025-05-14 RX ORDER — KETOROLAC TROMETHAMINE 30 MG/ML
30 INJECTION, SOLUTION INTRAMUSCULAR; INTRAVENOUS EVERY 6 HOURS SCHEDULED
Status: DISCONTINUED | OUTPATIENT
Start: 2025-05-14 | End: 2025-05-16 | Stop reason: HOSPADM

## 2025-05-14 RX ORDER — APREPITANT 40 MG/1
40 CAPSULE ORAL ONCE
Status: COMPLETED | OUTPATIENT
Start: 2025-05-14 | End: 2025-05-14

## 2025-05-14 RX ORDER — PROCHLORPERAZINE MALEATE 10 MG
10 TABLET ORAL EVERY 6 HOURS PRN
Status: DISCONTINUED | OUTPATIENT
Start: 2025-05-14 | End: 2025-05-16 | Stop reason: HOSPADM

## 2025-05-14 RX ORDER — PANTOPRAZOLE SODIUM 40 MG/10ML
40 INJECTION, POWDER, LYOPHILIZED, FOR SOLUTION INTRAVENOUS
Status: DISCONTINUED | OUTPATIENT
Start: 2025-05-15 | End: 2025-05-16

## 2025-05-14 RX ORDER — ACETAMINOPHEN 10 MG/ML
1000 INJECTION, SOLUTION INTRAVENOUS EVERY 6 HOURS
Status: COMPLETED | OUTPATIENT
Start: 2025-05-14 | End: 2025-05-15

## 2025-05-14 RX ADMIN — Medication 200 MCG: at 11:51

## 2025-05-14 RX ADMIN — HYDROMORPHONE HYDROCHLORIDE 0.5 MG: 2 INJECTION, SOLUTION INTRAMUSCULAR; INTRAVENOUS; SUBCUTANEOUS at 13:18

## 2025-05-14 RX ADMIN — ROCURONIUM BROMIDE 10 MG: 10 INJECTION, SOLUTION INTRAVENOUS at 12:34

## 2025-05-14 RX ADMIN — CYCLOBENZAPRINE HYDROCHLORIDE 10 MG: 10 TABLET, FILM COATED ORAL at 21:13

## 2025-05-14 RX ADMIN — ACETAMINOPHEN 1000 MG: 10 INJECTION, SOLUTION INTRAVENOUS at 16:05

## 2025-05-14 RX ADMIN — SCOPOLAMINE 1 PATCH: 1.5 PATCH, EXTENDED RELEASE TRANSDERMAL at 10:34

## 2025-05-14 RX ADMIN — DEXAMETHASONE SODIUM PHOSPHATE 4 MG: 4 INJECTION, SOLUTION INTRAMUSCULAR; INTRAVENOUS at 11:11

## 2025-05-14 RX ADMIN — ONDANSETRON 4 MG: 2 INJECTION, SOLUTION INTRAMUSCULAR; INTRAVENOUS at 12:50

## 2025-05-14 RX ADMIN — MIDAZOLAM 2 MG: 1 INJECTION INTRAMUSCULAR; INTRAVENOUS at 11:03

## 2025-05-14 RX ADMIN — SODIUM CHLORIDE, SODIUM LACTATE, POTASSIUM CHLORIDE, AND CALCIUM CHLORIDE 125 ML/HR: 600; 310; 30; 20 INJECTION, SOLUTION INTRAVENOUS at 23:16

## 2025-05-14 RX ADMIN — DOCUSATE SODIUM 100 MG: 50 LIQUID ORAL at 21:13

## 2025-05-14 RX ADMIN — LIDOCAINE HYDROCHLORIDE 100 MG: 20 INJECTION, SOLUTION INFILTRATION; PERINEURAL at 11:05

## 2025-05-14 RX ADMIN — ONDANSETRON 4 MG: 2 INJECTION, SOLUTION INTRAMUSCULAR; INTRAVENOUS at 17:14

## 2025-05-14 RX ADMIN — ROCURONIUM BROMIDE 20 MG: 10 INJECTION, SOLUTION INTRAVENOUS at 12:09

## 2025-05-14 RX ADMIN — HYDROMORPHONE HYDROCHLORIDE 0.2 MG: 0.2 INJECTION, SOLUTION INTRAMUSCULAR; INTRAVENOUS; SUBCUTANEOUS at 14:27

## 2025-05-14 RX ADMIN — SUGAMMADEX 400 MG: 100 INJECTION, SOLUTION INTRAVENOUS at 13:11

## 2025-05-14 RX ADMIN — SODIUM CHLORIDE, SODIUM LACTATE, POTASSIUM CHLORIDE, AND CALCIUM CHLORIDE 125 ML/HR: 600; 310; 30; 20 INJECTION, SOLUTION INTRAVENOUS at 16:06

## 2025-05-14 RX ADMIN — SODIUM CHLORIDE, POTASSIUM CHLORIDE, SODIUM LACTATE AND CALCIUM CHLORIDE: 600; 310; 30; 20 INJECTION, SOLUTION INTRAVENOUS at 11:03

## 2025-05-14 RX ADMIN — HYDROMORPHONE HYDROCHLORIDE 0.25 MG: 2 INJECTION, SOLUTION INTRAMUSCULAR; INTRAVENOUS; SUBCUTANEOUS at 13:23

## 2025-05-14 RX ADMIN — KETOROLAC TROMETHAMINE 30 MG: 30 INJECTION, SOLUTION INTRAMUSCULAR; INTRAVENOUS at 21:13

## 2025-05-14 RX ADMIN — HYDROMORPHONE HYDROCHLORIDE 0.25 MG: 2 INJECTION, SOLUTION INTRAMUSCULAR; INTRAVENOUS; SUBCUTANEOUS at 12:45

## 2025-05-14 RX ADMIN — HYDROMORPHONE HYDROCHLORIDE 0.2 MG: 0.2 INJECTION, SOLUTION INTRAMUSCULAR; INTRAVENOUS; SUBCUTANEOUS at 14:08

## 2025-05-14 RX ADMIN — CEFAZOLIN SODIUM 2 G: 2 SOLUTION INTRAVENOUS at 11:12

## 2025-05-14 RX ADMIN — ACETAMINOPHEN 1000 MG: 10 INJECTION, SOLUTION INTRAVENOUS at 22:43

## 2025-05-14 RX ADMIN — MORPHINE SULFATE 2 MG: 2 INJECTION, SOLUTION INTRAMUSCULAR; INTRAVENOUS at 16:05

## 2025-05-14 RX ADMIN — FENTANYL CITRATE 50 MCG: 50 INJECTION, SOLUTION INTRAMUSCULAR; INTRAVENOUS at 11:05

## 2025-05-14 RX ADMIN — Medication 100 MCG: at 11:28

## 2025-05-14 RX ADMIN — FENTANYL CITRATE 50 MCG: 50 INJECTION, SOLUTION INTRAMUSCULAR; INTRAVENOUS at 11:24

## 2025-05-14 RX ADMIN — Medication 2 L/MIN: at 16:56

## 2025-05-14 RX ADMIN — ROCURONIUM BROMIDE 80 MG: 10 INJECTION, SOLUTION INTRAVENOUS at 11:06

## 2025-05-14 RX ADMIN — PROPOFOL 200 MG: 10 INJECTION, EMULSION INTRAVENOUS at 11:05

## 2025-05-14 RX ADMIN — FENTANYL CITRATE 50 MCG: 50 INJECTION INTRAMUSCULAR; INTRAVENOUS at 13:44

## 2025-05-14 RX ADMIN — HYDROMORPHONE HYDROCHLORIDE 0.25 MG: 2 INJECTION, SOLUTION INTRAMUSCULAR; INTRAVENOUS; SUBCUTANEOUS at 12:53

## 2025-05-14 RX ADMIN — APREPITANT 40 MG: 40 CAPSULE ORAL at 10:36

## 2025-05-14 RX ADMIN — CEFAZOLIN 2 G: 2 INJECTION, POWDER, FOR SOLUTION INTRAMUSCULAR; INTRAVENOUS at 21:14

## 2025-05-14 RX ADMIN — HEPARIN SODIUM 5000 UNITS: 5000 INJECTION, SOLUTION INTRAVENOUS; SUBCUTANEOUS at 10:40

## 2025-05-14 RX ADMIN — FENTANYL CITRATE 50 MCG: 50 INJECTION INTRAMUSCULAR; INTRAVENOUS at 13:31

## 2025-05-14 RX ADMIN — Medication 150 MCG: at 11:38

## 2025-05-14 SDOH — HEALTH STABILITY: MENTAL HEALTH: HOW MANY DRINKS CONTAINING ALCOHOL DO YOU HAVE ON A TYPICAL DAY WHEN YOU ARE DRINKING?: PATIENT DOES NOT DRINK

## 2025-05-14 SDOH — SOCIAL STABILITY: SOCIAL INSECURITY
WITHIN THE LAST YEAR, HAVE YOU BEEN RAPED OR FORCED TO HAVE ANY KIND OF SEXUAL ACTIVITY BY YOUR PARTNER OR EX-PARTNER?: NO

## 2025-05-14 SDOH — HEALTH STABILITY: MENTAL HEALTH: CURRENT SMOKER: 0

## 2025-05-14 SDOH — SOCIAL STABILITY: SOCIAL INSECURITY
WITHIN THE LAST YEAR, HAVE YOU BEEN KICKED, HIT, SLAPPED, OR OTHERWISE PHYSICALLY HURT BY YOUR PARTNER OR EX-PARTNER?: NO

## 2025-05-14 SDOH — ECONOMIC STABILITY: HOUSING INSECURITY: IN THE LAST 12 MONTHS, WAS THERE A TIME WHEN YOU WERE NOT ABLE TO PAY THE MORTGAGE OR RENT ON TIME?: NO

## 2025-05-14 SDOH — HEALTH STABILITY: MENTAL HEALTH
DO YOU FEEL STRESS - TENSE, RESTLESS, NERVOUS, OR ANXIOUS, OR UNABLE TO SLEEP AT NIGHT BECAUSE YOUR MIND IS TROUBLED ALL THE TIME - THESE DAYS?: ONLY A LITTLE

## 2025-05-14 SDOH — ECONOMIC STABILITY: INCOME INSECURITY: IN THE PAST 12 MONTHS HAS THE ELECTRIC, GAS, OIL, OR WATER COMPANY THREATENED TO SHUT OFF SERVICES IN YOUR HOME?: NO

## 2025-05-14 SDOH — SOCIAL STABILITY: SOCIAL INSECURITY: WITHIN THE LAST YEAR, HAVE YOU BEEN AFRAID OF YOUR PARTNER OR EX-PARTNER?: NO

## 2025-05-14 SDOH — ECONOMIC STABILITY: HOUSING INSECURITY: AT ANY TIME IN THE PAST 12 MONTHS, WERE YOU HOMELESS OR LIVING IN A SHELTER (INCLUDING NOW)?: NO

## 2025-05-14 SDOH — ECONOMIC STABILITY: FOOD INSECURITY: WITHIN THE PAST 12 MONTHS, THE FOOD YOU BOUGHT JUST DIDN'T LAST AND YOU DIDN'T HAVE MONEY TO GET MORE.: NEVER TRUE

## 2025-05-14 SDOH — SOCIAL STABILITY: SOCIAL INSECURITY: ARE YOU MARRIED, WIDOWED, DIVORCED, SEPARATED, NEVER MARRIED, OR LIVING WITH A PARTNER?: MARRIED

## 2025-05-14 SDOH — SOCIAL STABILITY: SOCIAL NETWORK: HOW OFTEN DO YOU GET TOGETHER WITH FRIENDS OR RELATIVES?: MORE THAN THREE TIMES A WEEK

## 2025-05-14 SDOH — ECONOMIC STABILITY: TRANSPORTATION INSECURITY: IN THE PAST 12 MONTHS, HAS LACK OF TRANSPORTATION KEPT YOU FROM MEDICAL APPOINTMENTS OR FROM GETTING MEDICATIONS?: NO

## 2025-05-14 SDOH — ECONOMIC STABILITY: HOUSING INSECURITY: DO YOU FEEL UNSAFE GOING BACK TO THE PLACE WHERE YOU LIVE?: NO

## 2025-05-14 SDOH — SOCIAL STABILITY: SOCIAL INSECURITY: HAVE YOU HAD ANY THOUGHTS OF HARMING ANYONE ELSE?: NO

## 2025-05-14 SDOH — HEALTH STABILITY: PHYSICAL HEALTH: ON AVERAGE, HOW MANY DAYS PER WEEK DO YOU ENGAGE IN MODERATE TO STRENUOUS EXERCISE (LIKE A BRISK WALK)?: 7 DAYS

## 2025-05-14 SDOH — SOCIAL STABILITY: SOCIAL NETWORK
IN A TYPICAL WEEK, HOW MANY TIMES DO YOU TALK ON THE PHONE WITH FAMILY, FRIENDS, OR NEIGHBORS?: MORE THAN THREE TIMES A WEEK

## 2025-05-14 SDOH — HEALTH STABILITY: MENTAL HEALTH: HOW OFTEN DO YOU HAVE A DRINK CONTAINING ALCOHOL?: NEVER

## 2025-05-14 SDOH — SOCIAL STABILITY: SOCIAL INSECURITY: ARE THERE ANY APPARENT SIGNS OF INJURIES/BEHAVIORS THAT COULD BE RELATED TO ABUSE/NEGLECT?: NO

## 2025-05-14 SDOH — ECONOMIC STABILITY: FOOD INSECURITY: WITHIN THE PAST 12 MONTHS, YOU WORRIED THAT YOUR FOOD WOULD RUN OUT BEFORE YOU GOT THE MONEY TO BUY MORE.: NEVER TRUE

## 2025-05-14 SDOH — SOCIAL STABILITY: SOCIAL NETWORK
DO YOU BELONG TO ANY CLUBS OR ORGANIZATIONS SUCH AS CHURCH GROUPS, UNIONS, FRATERNAL OR ATHLETIC GROUPS, OR SCHOOL GROUPS?: PATIENT DECLINED

## 2025-05-14 SDOH — SOCIAL STABILITY: SOCIAL NETWORK: HOW OFTEN DO YOU ATTEND MEETINGS OF THE CLUBS OR ORGANIZATIONS YOU BELONG TO?: PATIENT DECLINED

## 2025-05-14 SDOH — SOCIAL STABILITY: SOCIAL INSECURITY: DO YOU FEEL UNSAFE GOING BACK TO THE PLACE WHERE YOU ARE LIVING?: NO

## 2025-05-14 SDOH — SOCIAL STABILITY: SOCIAL INSECURITY: WITHIN THE LAST YEAR, HAVE YOU BEEN HUMILIATED OR EMOTIONALLY ABUSED IN OTHER WAYS BY YOUR PARTNER OR EX-PARTNER?: NO

## 2025-05-14 SDOH — SOCIAL STABILITY: SOCIAL INSECURITY: DO YOU FEEL ANYONE HAS EXPLOITED OR TAKEN ADVANTAGE OF YOU FINANCIALLY OR OF YOUR PERSONAL PROPERTY?: NO

## 2025-05-14 SDOH — HEALTH STABILITY: MENTAL HEALTH: HOW OFTEN DO YOU HAVE SIX OR MORE DRINKS ON ONE OCCASION?: NEVER

## 2025-05-14 SDOH — SOCIAL STABILITY: SOCIAL NETWORK: HOW OFTEN DO YOU ATTEND CHURCH OR RELIGIOUS SERVICES?: PATIENT DECLINED

## 2025-05-14 SDOH — HEALTH STABILITY: PHYSICAL HEALTH
HOW OFTEN DO YOU NEED TO HAVE SOMEONE HELP YOU WHEN YOU READ INSTRUCTIONS, PAMPHLETS, OR OTHER WRITTEN MATERIAL FROM YOUR DOCTOR OR PHARMACY?: RARELY

## 2025-05-14 SDOH — HEALTH STABILITY: PHYSICAL HEALTH: ON AVERAGE, HOW MANY MINUTES DO YOU ENGAGE IN EXERCISE AT THIS LEVEL?: 30 MIN

## 2025-05-14 SDOH — ECONOMIC STABILITY: FOOD INSECURITY: HOW HARD IS IT FOR YOU TO PAY FOR THE VERY BASICS LIKE FOOD, HOUSING, MEDICAL CARE, AND HEATING?: NOT HARD AT ALL

## 2025-05-14 SDOH — SOCIAL STABILITY: SOCIAL INSECURITY: ABUSE: ADULT

## 2025-05-14 SDOH — SOCIAL STABILITY: SOCIAL INSECURITY: WERE YOU ABLE TO COMPLETE ALL THE BEHAVIORAL HEALTH SCREENINGS?: YES

## 2025-05-14 SDOH — SOCIAL STABILITY: SOCIAL INSECURITY: ARE YOU OR HAVE YOU BEEN THREATENED OR ABUSED PHYSICALLY, EMOTIONALLY, OR SEXUALLY BY ANYONE?: NO

## 2025-05-14 SDOH — ECONOMIC STABILITY: HOUSING INSECURITY: IN THE PAST 12 MONTHS, HOW MANY TIMES HAVE YOU MOVED WHERE YOU WERE LIVING?: 0

## 2025-05-14 SDOH — SOCIAL STABILITY: SOCIAL INSECURITY: HAS ANYONE EVER THREATENED TO HURT YOUR FAMILY OR YOUR PETS?: NO

## 2025-05-14 SDOH — SOCIAL STABILITY: SOCIAL INSECURITY: DOES ANYONE TRY TO KEEP YOU FROM HAVING/CONTACTING OTHER FRIENDS OR DOING THINGS OUTSIDE YOUR HOME?: NO

## 2025-05-14 SDOH — SOCIAL STABILITY: SOCIAL INSECURITY: HAVE YOU HAD THOUGHTS OF HARMING ANYONE ELSE?: NO

## 2025-05-14 ASSESSMENT — PAIN DESCRIPTION - LOCATION
LOCATION: ABDOMEN

## 2025-05-14 ASSESSMENT — COGNITIVE AND FUNCTIONAL STATUS - GENERAL
DAILY ACTIVITIY SCORE: 24
MOVING FROM LYING ON BACK TO SITTING ON SIDE OF FLAT BED WITH BEDRAILS: A LITTLE
PATIENT BASELINE BEDBOUND: NO
CLIMB 3 TO 5 STEPS WITH RAILING: A LITTLE
DAILY ACTIVITIY SCORE: 24
MOVING TO AND FROM BED TO CHAIR: A LITTLE
CLIMB 3 TO 5 STEPS WITH RAILING: A LITTLE
MOBILITY SCORE: 20
CLIMB 3 TO 5 STEPS WITH RAILING: A LITTLE
DAILY ACTIVITIY SCORE: 19
MOBILITY SCORE: 23
DRESSING REGULAR LOWER BODY CLOTHING: A LITTLE
DRESSING REGULAR UPPER BODY CLOTHING: A LITTLE
HELP NEEDED FOR BATHING: A LITTLE
MOBILITY SCORE: 23
PERSONAL GROOMING: A LITTLE
CLIMB 3 TO 5 STEPS WITH RAILING: A LITTLE
TOILETING: A LITTLE
MOBILITY SCORE: 23
TURNING FROM BACK TO SIDE WHILE IN FLAT BAD: A LITTLE

## 2025-05-14 ASSESSMENT — PAIN SCALES - GENERAL
PAINLEVEL_OUTOF10: 4
PAINLEVEL_OUTOF10: 6
PAINLEVEL_OUTOF10: 10 - WORST POSSIBLE PAIN
PAINLEVEL_OUTOF10: 6
PAINLEVEL_OUTOF10: 8
PAINLEVEL_OUTOF10: 0 - NO PAIN
PAINLEVEL_OUTOF10: 0 - NO PAIN
PAINLEVEL_OUTOF10: 5 - MODERATE PAIN
PAINLEVEL_OUTOF10: 8

## 2025-05-14 ASSESSMENT — ACTIVITIES OF DAILY LIVING (ADL)
HEARING - LEFT EAR: FUNCTIONAL
BATHING: INDEPENDENT
PATIENT'S MEMORY ADEQUATE TO SAFELY COMPLETE DAILY ACTIVITIES?: YES
DRESSING YOURSELF: INDEPENDENT
HEARING - RIGHT EAR: FUNCTIONAL
LACK_OF_TRANSPORTATION: NO
GROOMING: INDEPENDENT
TOILETING: INDEPENDENT
JUDGMENT_ADEQUATE_SAFELY_COMPLETE_DAILY_ACTIVITIES: YES
WALKS IN HOME: INDEPENDENT
ADEQUATE_TO_COMPLETE_ADL: YES
FEEDING YOURSELF: INDEPENDENT
ASSISTIVE_DEVICE: EYEGLASSES

## 2025-05-14 ASSESSMENT — PAIN DESCRIPTION - DESCRIPTORS
DESCRIPTORS: DISCOMFORT
DESCRIPTORS: ACHING;DISCOMFORT;NAGGING
DESCRIPTORS: ACHING;DISCOMFORT
DESCRIPTORS: ACHING;DISCOMFORT;JABBING
DESCRIPTORS: CRAMPING;DISCOMFORT

## 2025-05-14 ASSESSMENT — PAIN - FUNCTIONAL ASSESSMENT
PAIN_FUNCTIONAL_ASSESSMENT: FLACC (FACE, LEGS, ACTIVITY, CRY, CONSOLABILITY)
PAIN_FUNCTIONAL_ASSESSMENT: 0-10

## 2025-05-14 ASSESSMENT — LIFESTYLE VARIABLES
SKIP TO QUESTIONS 9-10: 1
PRESCIPTION_ABUSE_PAST_12_MONTHS: NO
AUDIT-C TOTAL SCORE: 0
SUBSTANCE_ABUSE_PAST_12_MONTHS: NO

## 2025-05-14 ASSESSMENT — COLUMBIA-SUICIDE SEVERITY RATING SCALE - C-SSRS
6. HAVE YOU EVER DONE ANYTHING, STARTED TO DO ANYTHING, OR PREPARED TO DO ANYTHING TO END YOUR LIFE?: NO
1. IN THE PAST MONTH, HAVE YOU WISHED YOU WERE DEAD OR WISHED YOU COULD GO TO SLEEP AND NOT WAKE UP?: NO
2. HAVE YOU ACTUALLY HAD ANY THOUGHTS OF KILLING YOURSELF?: NO

## 2025-05-14 ASSESSMENT — PAIN SCALES - WONG BAKER: WONGBAKER_NUMERICALRESPONSE: NO HURT

## 2025-05-14 ASSESSMENT — PATIENT HEALTH QUESTIONNAIRE - PHQ9
1. LITTLE INTEREST OR PLEASURE IN DOING THINGS: NOT AT ALL
SUM OF ALL RESPONSES TO PHQ9 QUESTIONS 1 & 2: 0
2. FEELING DOWN, DEPRESSED OR HOPELESS: NOT AT ALL

## 2025-05-14 NOTE — OP NOTE
robotic bariatric sleeve Operative Note     Date: 2025  OR Location: [facility]    Name: Анна Bird, : 1985, Age: 40 y.o., MRN: [id number], Sex: female    Diagnosis  Pre-op Diagnosis      * Morbid obesity (Multi) [E66.01] Post-op Diagnosis     * Morbid obesity (Multi) [E66.01]     Procedures  robotic bariatric sleeve  66965 - MI LAPS GSTRC RSTRICTIV PX LONGITUDINAL GASTRECTOMY    Hiatal Hernia Repair     Surgeons      * Kristopher Camacho V - Primary    Resident/Fellow/Other Assistant:  Surgeons and Role:  * No surgeons found with a matching role *    Staff:   Circulator: Teri Parryub Person: Roz  Surgical Assistant: Yani Miranda Scrub: Sally Miranda Circulator: Priti Miranda Scrub: Mino    Anesthesia Staff: Anesthesiologist: Ney Olguin DO  C-AA: VLAD Naidu    Procedure Summary  Anesthesia: General  ASA: III  Estimated Blood Loss: 10 mL  Intra-op Medications:   Administrations occurring from 1145 to 1400 on 25:   Medication Name Total Dose   bupivacaine PF (Marcaine) 0.5 % (5 mg/mL) injection 30 mL   lidocaine (Xylocaine) 10 mg/mL (1 %) injection 30 mL   HYDROmorphone (Dilaudid) injection 2 mg/mL 1.25 mg   LR bolus Cannot be calculated   ondansetron (Zofran) 2 mg/mL injection 4 mg   phenylephrine 100 mcg/mL syringe 10 mL (prefilled) 200 mcg   rocuronium (ZeMuron) 50 mg/5 mL injection 30 mg   sugammadex (Bridion) 200 mg/2 mL injection 400 mg              Anesthesia Record               Intraprocedure I/O Totals          Intake    LR bolus 1250.00 mL    Total Intake 1250 mL          Specimen:   ID Type Source Tests Collected by Time   1 : STOMACH Tissue STOMACH SLEEVE RESECTION SURGICAL PATHOLOGY EXAM Kristopher HERNANDEZ MD 2025 1227          Findings: Negative intra-operative leak test.  Small, sliding hiatal hernia    Indications: Анна Bird is an 40 y.o. female who is having surgery for Morbid obesity (Multi) [E66.01]. She has a history of morbid obesity  with a BMI of 45 and obesity related co-morbidities including HTN< back pain, IRVIN, migraines and urinary incontinence.  She has completed a comprehensive bariatric surgery program, and now presents for a robotic assisted laparoscopic sleeve gastrectomy.  I explained to her the risks and benefits of undergoing the operation including the risk of bleeding, infection, staple line leak, or injury to surrounding structures.  The patient agreed to proceed with the operation.    The patient was seen in the preoperative area. The risks, benefits, complications, treatment options, non-operative alternatives, expected recovery and outcomes were discussed with the patient. The possibilities of reaction to medication, pulmonary aspiration, injury to surrounding structures, bleeding, recurrent infection, the need for additional procedures, failure to diagnose a condition, and creating a complication requiring transfusion or operation were discussed with the patient. The patient concurred with the proposed plan, giving informed consent.  The site of surgery was properly noted/marked if necessary per policy. The patient has been actively warmed in preoperative area. Preoperative antibiotics have been ordered and given within 1 hours of incision. Venous thrombosis prophylaxis have been ordered including bilateral sequential compression devices and chemical prophylaxis    Procedure Details:     The patient brought the operating room, placed supine on the operative table, and general endotracheal anesthesia was induced. Her abdomen was then prepped and draped sterilely.  We began by first measuring 20 cm from the xiphoid process and used an optical view trocar to gain access the abdomen to the left of the umbilicus.  We then insufflated the abdomen to 15 mmHg.  We inspected our access port site and noted no injury to underlying bowel.  We placed a 12 mm robotic port in the right upper quadrant, just to the right of midline, followed  by another 8 mm port in the right mid-clavicular line.  We performed bilateral tap blocks using a 1:1 combination of 1% lidocaine and 0.5% bupivacaine.  We exchanged the 5 mm optical view trocar for another 8 mm robotic port and placed another 8 mm robotic port in the left anterior axillary line.   We then placed a Juwan liver retractor and secured it to the bed.  We then placed the patient into steep, reverse Trendelenburg position.  At this point we docked our robot.  We placed a vessel sealer and tip up grasper in the left ports, and a fenestrated bipolar in the right upper quadrant port.  On inspection of the hiatus we noted evidence of a small sliding hiatal hernia.  We began our mobilization along the greater curvature of the stomach using the vessel sealer.  We coagulated the short gastric vessels up the greater curvature of the stomach, exposing the angle of His and left bonifacio of the diaphragm, fully mobilizing the patient's large fundus.  We then mobilized the distal portion of the stomach 5 cm proximal to the pylorus.  We then proceeded with hiatal dissection.  I opened up the pars flaccida, and expose the right bonifacio of the diaphragm.  We opened up the peritoneum along the right bonifacio of the diaphragm, and dissected up the right bonifacio, and took this dissection anteriorly across towards the left bonifacio.  We then created a posterior window.  Once we circumferentially dissected around the esophagus we are left with greater than 3 cm of intra-abdominal esophageal length.  We closed the hiatal defect using a 2-0 V-Loc suture.  We closed the hiatus over a 36 Mauritian VISI G bougie.   We then proceeded with creation of her sleeve over a  36 Fr Visi-Gi  bougie which was passed along the lesser curvature of the stomach and attached to suction.  We started to fashion the sleeve 5 cm proximal to the pylorus using a black load with seam guard reinforcement.  We then proceeded with one additional green loads with seam  guard reinforcement followed by three blue loads with SeamGuard.  We had no excessive pauses for compression as we created the sleeve.   Once we had completely stapled off the stomach we performed an intra-operative leak test, using the VisiGi, and noted no air bubbles along our staple line.  We removed the portion of the stomach through our 12 mm port site and sent it to pathology as a specimen.   We removed the Juwan liver retractor.  We then closed the 12 mm port site using a Saurabh Burhc with a 0 Vicryl suture.  We desufflated the abdomen.  We closed the remaining skin incisions using 4-0 Monocryl and Dermabond for the skin.  At the end of the procedure all needle, sponge, and instrument counts were correct.  The patient tolerated the procedure well and was brought to the postanesthesia care unit for recovery.      Evidence of Infection: No   Complications:  None; patient tolerated the procedure well.    Disposition: PACU - hemodynamically stable.  Condition: stable         Task Performed by RNFA or Surgical Assistant:  No qualified resident was available to assist.     An assistant was used throughout the case and assisted in retraction, visualization, and improved the flow of the case.    This was a laparoscopic case and the assistant was used for maneuvering the camera and visualization.    The robot was used during this case and the assistant facilitated the exchange of instruments and managed the bedside robotic needs while I was at the console.        Attending Attestation: I was present and scrubbed for the entire procedure.    Kristopher Camacho V  Phone Number: [phone]

## 2025-05-14 NOTE — ANESTHESIA PROCEDURE NOTES
Airway  Date/Time: 5/14/2025 11:08 AM  Reason: elective    Airway not difficult    Staffing  Performed: VLAD   Authorized by: Ney Olguin DO    Performed by: VLAD Naidu  Patient location during procedure: OR    Patient Condition  Indications for airway management: anesthesia  Patient position: sniffing  Sedation level: deep     Final Airway Details   Preoxygenated: yes  Final airway type: endotracheal airway  Successful airway: ETT  Cuffed: yes   Successful intubation technique: direct laryngoscopy  Adjuncts used in placement: intubating stylet  Endotracheal tube insertion site: oral  Blade: Manjit  Blade size: #3  ETT size (mm): 7.0  Cormack-Lehane Classification: grade I - full view of glottis  Placement verified by: capnometry   Measured from: lips  ETT to lips (cm): 23  Number of attempts at approach: 1

## 2025-05-14 NOTE — ANESTHESIA PROCEDURE NOTES
Peripheral IV  Date/Time: 5/14/2025 11:09 AM      Placement  Needle size: 18 G  Laterality: left  Location: hand  Local anesthetic: none  Site prep: alcohol  Technique: anatomical landmarks  Attempts: 1

## 2025-05-14 NOTE — CARE PLAN
The patient's goals for the shift include Walk around later.    The clinical goals for the shift include Manage pain    Over the shift, the patient did not make progress toward the following goals. Barriers to progression include decreased energy. Recommendations to address these barriers include walk more.

## 2025-05-14 NOTE — NURSING NOTE
Received pt from pacu via bed oriented to room call system etc SCDs on and IS given and instructed. Pt up to BR with min assist tolerated well

## 2025-05-14 NOTE — ANESTHESIA POSTPROCEDURE EVALUATION
Patient: Анна Bird    Procedure Summary       Date: 05/14/25 Room / Location: [facility] OR 12 / [facility]    Anesthesia Start: 1103 Anesthesia Stop: 1324    Procedure: robotic bariatric sleeve Diagnosis:       Morbid obesity (Multi)      (Morbid obesity (Multi) [E66.01])    Surgeons: Kristopher HERNANDEZ MD Responsible Provider: Ney Olguin DO    Anesthesia Type: general ASA Status: 3            Anesthesia Type: general    Vitals Value Taken Time   /75 05/14/25 14:32   Temp 36.5 °C (97.7 °F) 05/14/25 13:20   Pulse 75 05/14/25 14:34   Resp 28 05/14/25 14:34   SpO2 99 % 05/14/25 14:34   Vitals shown include unfiled device data.    Anesthesia Post Evaluation    Patient location during evaluation: bedside  Patient participation: complete - patient participated  Level of consciousness: awake and alert  Pain management: adequate  Multimodal analgesia pain management approach  Airway patency: patent  Two or more strategies used to mitigate risk of obstructive sleep apnea  Cardiovascular status: acceptable  Respiratory status: acceptable  Hydration status: acceptable  Postoperative Nausea and Vomiting: none        There were no known notable events for this encounter.

## 2025-05-15 ENCOUNTER — APPOINTMENT (OUTPATIENT)
Dept: RADIOLOGY | Facility: HOSPITAL | Age: 40
End: 2025-05-15
Payer: COMMERCIAL

## 2025-05-15 LAB
ALBUMIN SERPL BCP-MCNC: 3.7 G/DL (ref 3.4–5)
ALP SERPL-CCNC: 56 U/L (ref 33–110)
ALT SERPL W P-5'-P-CCNC: 37 U/L (ref 7–45)
ANION GAP SERPL CALCULATED.3IONS-SCNC: 13 MMOL/L (ref 10–20)
AST SERPL W P-5'-P-CCNC: 36 U/L (ref 9–39)
BASOPHILS # BLD AUTO: 0.01 X10*3/UL (ref 0–0.1)
BASOPHILS NFR BLD AUTO: 0.1 %
BILIRUB SERPL-MCNC: 0.4 MG/DL (ref 0–1.2)
BUN SERPL-MCNC: 10 MG/DL (ref 6–23)
CALCIUM SERPL-MCNC: 8.9 MG/DL (ref 8.6–10.3)
CHLORIDE SERPL-SCNC: 100 MMOL/L (ref 98–107)
CO2 SERPL-SCNC: 25 MMOL/L (ref 21–32)
CREAT SERPL-MCNC: 0.69 MG/DL (ref 0.5–1.05)
EGFRCR SERPLBLD CKD-EPI 2021: >90 ML/MIN/1.73M*2
EOSINOPHIL # BLD AUTO: 0 X10*3/UL (ref 0–0.7)
EOSINOPHIL NFR BLD AUTO: 0 %
ERYTHROCYTE [DISTWIDTH] IN BLOOD BY AUTOMATED COUNT: 15.7 % (ref 11.5–14.5)
GLUCOSE SERPL-MCNC: 121 MG/DL (ref 74–99)
HCT VFR BLD AUTO: 36.8 % (ref 36–46)
HGB BLD-MCNC: 11.4 G/DL (ref 12–16)
IMM GRANULOCYTES # BLD AUTO: 0.06 X10*3/UL (ref 0–0.7)
IMM GRANULOCYTES NFR BLD AUTO: 0.4 % (ref 0–0.9)
LYMPHOCYTES # BLD AUTO: 1.52 X10*3/UL (ref 1.2–4.8)
LYMPHOCYTES NFR BLD AUTO: 10.9 %
MCH RBC QN AUTO: 25.4 PG (ref 26–34)
MCHC RBC AUTO-ENTMCNC: 31 G/DL (ref 32–36)
MCV RBC AUTO: 82 FL (ref 80–100)
MONOCYTES # BLD AUTO: 0.7 X10*3/UL (ref 0.1–1)
MONOCYTES NFR BLD AUTO: 5 %
NEUTROPHILS # BLD AUTO: 11.6 X10*3/UL (ref 1.2–7.7)
NEUTROPHILS NFR BLD AUTO: 83.6 %
NRBC BLD-RTO: 0 /100 WBCS (ref 0–0)
PLATELET # BLD AUTO: 437 X10*3/UL (ref 150–450)
POTASSIUM SERPL-SCNC: 3.7 MMOL/L (ref 3.5–5.3)
PROT SERPL-MCNC: 6.9 G/DL (ref 6.4–8.2)
RBC # BLD AUTO: 4.48 X10*6/UL (ref 4–5.2)
SODIUM SERPL-SCNC: 134 MMOL/L (ref 136–145)
WBC # BLD AUTO: 13.9 X10*3/UL (ref 4.4–11.3)

## 2025-05-15 PROCEDURE — 2500000004 HC RX 250 GENERAL PHARMACY W/ HCPCS (ALT 636 FOR OP/ED): Mod: JW | Performed by: SURGERY

## 2025-05-15 PROCEDURE — 74220 X-RAY XM ESOPHAGUS 1CNTRST: CPT | Performed by: RADIOLOGY

## 2025-05-15 PROCEDURE — 96372 THER/PROPH/DIAG INJ SC/IM: CPT | Performed by: SURGERY

## 2025-05-15 PROCEDURE — 2550000001 HC RX 255 CONTRASTS: Mod: JW | Performed by: SURGERY

## 2025-05-15 PROCEDURE — 84075 ASSAY ALKALINE PHOSPHATASE: CPT | Performed by: SURGERY

## 2025-05-15 PROCEDURE — 74220 X-RAY XM ESOPHAGUS 1CNTRST: CPT

## 2025-05-15 PROCEDURE — 7100000011 HC EXTENDED STAY RECOVERY HOURLY - NURSING UNIT

## 2025-05-15 PROCEDURE — 85025 COMPLETE CBC W/AUTO DIFF WBC: CPT | Performed by: SURGERY

## 2025-05-15 PROCEDURE — 2500000001 HC RX 250 WO HCPCS SELF ADMINISTERED DRUGS (ALT 637 FOR MEDICARE OP): Performed by: SURGERY

## 2025-05-15 PROCEDURE — 36415 COLL VENOUS BLD VENIPUNCTURE: CPT | Performed by: SURGERY

## 2025-05-15 PROCEDURE — 94760 N-INVAS EAR/PLS OXIMETRY 1: CPT

## 2025-05-15 RX ORDER — OXYCODONE HCL 5 MG/5 ML
5 SOLUTION, ORAL ORAL EVERY 6 HOURS PRN
Qty: 90 ML | Refills: 0 | Status: SHIPPED | OUTPATIENT
Start: 2025-05-15 | End: 2025-05-18

## 2025-05-15 RX ORDER — OMEPRAZOLE 40 MG/1
40 CAPSULE, DELAYED RELEASE ORAL DAILY
Qty: 90 CAPSULE | Refills: 1 | Status: SHIPPED | OUTPATIENT
Start: 2025-05-15 | End: 2025-11-11

## 2025-05-15 RX ORDER — ONDANSETRON 4 MG/1
4 TABLET, ORALLY DISINTEGRATING ORAL EVERY 8 HOURS PRN
Qty: 20 TABLET | Refills: 1 | Status: SHIPPED | OUTPATIENT
Start: 2025-05-15 | End: 2025-05-29

## 2025-05-15 RX ADMIN — ACETAMINOPHEN 1000 MG: 10 INJECTION, SOLUTION INTRAVENOUS at 21:45

## 2025-05-15 RX ADMIN — CEFAZOLIN 2 G: 2 INJECTION, POWDER, FOR SOLUTION INTRAMUSCULAR; INTRAVENOUS at 03:02

## 2025-05-15 RX ADMIN — ACETAMINOPHEN 1000 MG: 10 INJECTION, SOLUTION INTRAVENOUS at 03:01

## 2025-05-15 RX ADMIN — DOCUSATE SODIUM 100 MG: 50 LIQUID ORAL at 09:59

## 2025-05-15 RX ADMIN — HEPARIN SODIUM 7500 UNITS: 5000 INJECTION INTRAVENOUS; SUBCUTANEOUS at 13:37

## 2025-05-15 RX ADMIN — CYCLOBENZAPRINE HYDROCHLORIDE 10 MG: 10 TABLET, FILM COATED ORAL at 21:45

## 2025-05-15 RX ADMIN — KETOROLAC TROMETHAMINE 30 MG: 30 INJECTION, SOLUTION INTRAMUSCULAR; INTRAVENOUS at 16:12

## 2025-05-15 RX ADMIN — PANTOPRAZOLE SODIUM 40 MG: 40 INJECTION, POWDER, FOR SOLUTION INTRAVENOUS at 06:08

## 2025-05-15 RX ADMIN — KETOROLAC TROMETHAMINE 30 MG: 30 INJECTION, SOLUTION INTRAMUSCULAR; INTRAVENOUS at 03:01

## 2025-05-15 RX ADMIN — ACETAMINOPHEN 1000 MG: 10 INJECTION, SOLUTION INTRAVENOUS at 16:57

## 2025-05-15 RX ADMIN — IOHEXOL 30 ML: 350 INJECTION, SOLUTION INTRAVENOUS at 09:58

## 2025-05-15 RX ADMIN — SODIUM CHLORIDE, SODIUM LACTATE, POTASSIUM CHLORIDE, AND CALCIUM CHLORIDE 125 ML/HR: 600; 310; 30; 20 INJECTION, SOLUTION INTRAVENOUS at 23:41

## 2025-05-15 RX ADMIN — ACETAMINOPHEN 1000 MG: 10 INJECTION, SOLUTION INTRAVENOUS at 11:00

## 2025-05-15 RX ADMIN — SODIUM CHLORIDE, SODIUM LACTATE, POTASSIUM CHLORIDE, AND CALCIUM CHLORIDE 125 ML/HR: 600; 310; 30; 20 INJECTION, SOLUTION INTRAVENOUS at 12:25

## 2025-05-15 RX ADMIN — KETOROLAC TROMETHAMINE 30 MG: 30 INJECTION, SOLUTION INTRAMUSCULAR; INTRAVENOUS at 21:44

## 2025-05-15 RX ADMIN — KETOROLAC TROMETHAMINE 30 MG: 30 INJECTION, SOLUTION INTRAMUSCULAR; INTRAVENOUS at 09:59

## 2025-05-15 RX ADMIN — OXYCODONE HYDROCHLORIDE 5 MG: 5 SOLUTION ORAL at 17:41

## 2025-05-15 RX ADMIN — AMLODIPINE BESYLATE 5 MG: 5 TABLET ORAL at 09:59

## 2025-05-15 RX ADMIN — DOCUSATE SODIUM 100 MG: 50 LIQUID ORAL at 21:45

## 2025-05-15 RX ADMIN — HEPARIN SODIUM 7500 UNITS: 5000 INJECTION INTRAVENOUS; SUBCUTANEOUS at 21:44

## 2025-05-15 RX ADMIN — HEPARIN SODIUM 7500 UNITS: 5000 INJECTION INTRAVENOUS; SUBCUTANEOUS at 06:08

## 2025-05-15 ASSESSMENT — COGNITIVE AND FUNCTIONAL STATUS - GENERAL
MOBILITY SCORE: 24
DAILY ACTIVITIY SCORE: 24

## 2025-05-15 ASSESSMENT — PAIN DESCRIPTION - LOCATION: LOCATION: ABDOMEN

## 2025-05-15 ASSESSMENT — PAIN DESCRIPTION - DESCRIPTORS: DESCRIPTORS: THROBBING;TENDER;DISCOMFORT

## 2025-05-15 ASSESSMENT — PAIN SCALES - GENERAL: PAINLEVEL_OUTOF10: 10 - WORST POSSIBLE PAIN

## 2025-05-15 ASSESSMENT — PAIN DESCRIPTION - ORIENTATION: ORIENTATION: MID

## 2025-05-15 NOTE — NURSING NOTE
Pt tele noted to be alarming.  This nurse went into room and pt noted to be in a deep sleep.  This nurse awakened pt and pt was able to reposition and hr went to 63bpm.  Pt rhythm remains sinus. POC ongoing

## 2025-05-15 NOTE — CARE PLAN
The patient's goals for the shift include Walk around later.    The clinical goals for the shift include increase ambulation, increase oral intake

## 2025-05-15 NOTE — NURSING NOTE
Pt did 1 lap around nursing station up to this point during this shift.  Pt remained in room and in bed after encouraging pt to move. Pt was noted to ambulate to restroom in he room.

## 2025-05-15 NOTE — PROGRESS NOTES
"Анна Bird is a 40 y.o. female on day 1 of admission presenting with Morbid obesity (Multi).    Subjective   Анна is laying in bed. She denies chest pain or SOB. She has some incisional discomfort, but is tolerable. She is tolerating her diet.        Objective     Physical Exam  Constitutional:       Appearance: Normal appearance.   Cardiovascular:      Rate and Rhythm: Normal rate.   Pulmonary:      Effort: Pulmonary effort is normal.   Abdominal:      Palpations: Abdomen is soft.      Tenderness: There is no abdominal tenderness.      Comments: Incisions intact.    Neurological:      Mental Status: She is alert and oriented to person, place, and time.         Last Recorded Vitals  Blood pressure 149/74, pulse 59, temperature 36.9 °C (98.4 °F), temperature source Oral, resp. rate 18, height 1.6 m (5' 3\"), weight 110 kg (242 lb 8.1 oz), last menstrual period 04/28/2025, SpO2 100%.  Intake/Output last 3 Shifts:  I/O last 3 completed shifts:  In: 3464.6 (31.5 mL/kg) [P.O.:150; I.V.:1514.6 (13.8 mL/kg); IV Piggyback:1800]  Out: 1500 (13.6 mL/kg) [Urine:1500 (0.4 mL/kg/hr)]  Weight: 110 kg     Relevant Results                              Assessment & Plan  Morbid obesity (Multi)    Morbid obesity with BMI of 45.0-49.9, adult (Multi)    Анна and I reviewed the rate at which to drink her fluids and her fluid goal for the day. I encouraged her to continue ambulating and use her IS every hour.       I spent 15 minutes in the professional and overall care of this patient.      Gisella Barba, APRN-CNP    "

## 2025-05-15 NOTE — NURSING NOTE
This RN encouraged IS usage, ambulation and oral fluid intake. Patient has not kept up with recommended oral fluid intake.

## 2025-05-15 NOTE — PROGRESS NOTES
05/15/25 1009   Discharge Planning   Expected Discharge Disposition Home   Does the patient need discharge transport arranged? No     Patient feels they have all the help they need at home and are currently denying the need for additional services and/or resources on discharge.

## 2025-05-15 NOTE — DISCHARGE SUMMARY
Discharge Diagnosis  Morbid obesity (Multi)    Issues Requiring Follow-Up  Follow up in 2 weeks    Test Results Pending At Discharge  Pending Labs       Order Current Status    Surgical Pathology Exam In process            Hospital Course   Анна had Laparoscopic Vertical Sleeve Gastrectomy on 5/14. She had an UGI on 5/15 and was started on a diet.  She was discharged to home.     Pertinent Physical Exam At Time of Discharge  Physical Exam  Constitutional:       Appearance: Normal appearance.   Cardiovascular:      Rate and Rhythm: Normal rate.   Pulmonary:      Effort: Pulmonary effort is normal.   Abdominal:      Palpations: Abdomen is soft.      Tenderness: There is no abdominal tenderness.      Comments: Incisions intact.    Neurological:      Mental Status: She is alert and oriented to person, place, and time.         Home Medications     Medication List      START taking these medications     omeprazole 40 mg DR capsule; Commonly known as: PriLOSEC; Take 1 capsule   (40 mg) by mouth once daily. Open capsule. Do not crush, chew, or dissolve   beads.   ondansetron ODT 4 mg disintegrating tablet; Commonly known as:   Zofran-ODT; Dissolve 1 tablet (4 mg) in the mouth every 8 hours if needed   for vomiting for up to 14 days.   oxyCODONE 5 mg/5 mL solution; Commonly known as: Roxicodone; Take 5 mL   (5 mg) by mouth every 6 hours if needed for severe pain (7 - 10) for up to   3 days.     CONTINUE taking these medications     acetaminophen 500 mg tablet; Commonly known as: Tylenol   amLODIPine 5 mg tablet; Commonly known as: Norvasc; Take 1 tablet (5 mg)   by mouth once daily.   chlorthalidone 25 mg tablet; Commonly known as: Hygroton; Take 1 tablet   (25 mg) by mouth once daily.   cyclobenzaprine 10 mg tablet; Commonly known as: Flexeril; Take 1 tablet   (10 mg) by mouth once daily at bedtime.     STOP taking these medications     chlorhexidine 0.12 % solution; Commonly known as: Peridex   ferrous sulfate 325 mg  (65 mg elemental) tablet   multivitamin with minerals tablet     ASK your doctor about these medications     diclofenac sodium 1 % kit   DULoxetine 30 mg DR capsule; Commonly known as: Cymbalta; Take 1 capsule   (30 mg) by mouth once daily. Do not crush or chew.   nitrofurantoin (macrocrystal-monohydrate) 100 mg capsule; Commonly known   as: Macrobid       Outpatient Follow-Up  Future Appointments   Date Time Provider Department Center   5/29/2025 12:45 PM MD Pedro [id number]   5/29/2025  1:00 PM Mirela Ramírez RDN, LD [id number] [facility]       Gisella Barba, ALICIA-CNP

## 2025-05-15 NOTE — NURSING NOTE
Assumed care of this pt. Pt is ambulating in room, breathing even and unlabored on RA. NAD. Pt asking if she can shower. Messaged Dr Camacho regarding shower, per Dr post shower, see new orders. Pt denies further needs. Continuing to monitor

## 2025-05-15 NOTE — NURSING NOTE
Pt educated on npo status effective at 5am.  Pt NPO d/t esophogram this am.  Pt voiced understanding.

## 2025-05-15 NOTE — NURSING NOTE
This Rn encouraged ambulation and oral intake. Patient has only had an intake of 30ml of last hour. Patient upright in chair.

## 2025-05-15 NOTE — NURSING NOTE
Pt medicated per order and seen in her room.  Pt had ambulated to bathroom.  Pt encouraged to drink her 2oz of liquids per hour.  Pt states she has been compliant with drinking her fluids.  Pt requires no help with turning in bed,

## 2025-05-15 NOTE — NURSING NOTE
Bed side shift report received. Patient resting comfortably. Abdominal lap sites open to air with sealant. Patient remains NPO for upper Gi. Call light within reach. This nurse assumed care. Patient ambulating to bathroom. IS encouraged and sitting up in chair, and walking also encouraged. Bed in lowest position.

## 2025-05-15 NOTE — DISCHARGE INSTRUCTIONS
No bending, pushing, pulling, lifting more than 10lbs, twisting. Walk at least every hour during the day.    Face away from the water when showering, pat incision dry. Do not submerge incision until okayed by office. Do not rub any lotions or moisturizers on incisions.    Take sips of noncarbonated, low calorie, low to no sugar liquids every 3 to 5 minutes for a goal of 64 oz per day (8 cups). Rotate with protein for a goal of 60 grams of protein per day. NO straws, gum, milkshakes, or icecream.    If no CPAP at home, sleep upright and do not take any narcotics for several hours prior to napping or sleeping    Crush all medications and open all capsules larger than an M&M for at least 6 weeks. Mix the beads for a capsule in milk or a protein shake, not water, to prevent clumping. Do not drive while taking pain medication. You can take the patch off behind your ear on Saturday 5/17. Please make sure you wash your hands right after. Medication residue left on your hands can cause blurred vision.     Follow instructions: Sips of noncarbonated low to no calorie, low to no sugar liquids every three minutes for a goal of 8 glasses of fluid per day. Rotate with protein for a goal of 60g per day. Walk for 2-3 minutes every hour. Use the incentive spirometer 10 times per hour while awake.       Home-going Instructions    #1 Fluids             drink 64 oz of non-caffeinated fluids daily  #2 Walk               walk 3 - 5 minutes every hour during the day  #3 Spirometer   use the incentive spirometer 10 times an hour during the day  #4 Protein          60g goal daily    Wound Care  Do not submerge incisions in pool, bath, or hot tub  Do not peel off Dermabond -it will gradually loosen and fall off  You may shower and pat incisions dry  Do not apply any lotions, creams, or ointments to your incisions  Remove the dressing above your belly button seven days after it has been on  Activity  Do not push, pull, or lift.  Avoid  excessive bending and twisting at the waist.  You may walk stairs.  You may sleep in any position that feels comfortable.  You must get up and walk every hour during the day.  If you plan to take a nap during the day, set an alarm for one hour so you will get up and walk around.  Potential Complications   Wound Infection - redness, increased warmth, pain, thick drainage, fever  Blood Clot/Pulmonary Embolism - calf pain or swelling, chest pain, shortness of breath, racing heart, fast breathing  Leak - abdominal pain radiating down the left side (after eating/drinking), fever, fast breathing, or rapid heart rate.   CALL 9-1-1 WITH ANY CHEST PAIN OR SHORTNESS OF BREATH, otherwise call the office with any concerns. [phone]  Medications  All medications need to be crushable, chewable, in liquid form, or open capsules.   CANNOT crush extended release meds.  You must begin taking your stomach acid reducing medication the morning after you are discharged from the hospital.  Start taking your chewable or liquid multivitamin tomorrow.  Diet  Full liquid. All beverages must be low calorie, low sugar and caffeine free. NO carbonation, NO straws. Your first goal is to drink at least 64 oz. of fluid daily.  Your second goal is to drink a minimum of 60 grams of protein. Please refer to the nutrition guideline booklet for explicit instructions.    Follow-up  2 weeks

## 2025-05-15 NOTE — PROGRESS NOTES
Pharmacy Medication History Review    Анна Bird is a 40 y.o. female admitted for Morbid obesity (Multi). Pharmacy reviewed the patient's [id number] medications and allergies for accuracy.    Medications ADDED:  None   Medications CHANGED:  Cyclobenzaprine 10 mg  Medications REMOVED:   Duloxetine 30 mg   Diclofenac sodium 1%  Nitrofurantoin 100 mg   Chlorhexidine      The list below reflects the updated PTA list. Comments regarding how patient may be taking medications differently can be found in the Admit Orders Activity  Prior to Admission Medications   Prescriptions Last Dose Taking?   DULoxetine (Cymbalta) 30 mg DR capsule Not Taking No   Sig: Take 1 capsule (30 mg) by mouth once daily. Do not crush or chew.   Patient not taking: Reported on 5/15/2025   acetaminophen (Tylenol) 500 mg tablet Past Week Yes   Sig: Take 2 tablets (1,000 mg) by mouth every 8 hours if needed for mild pain (1 - 3) or moderate pain (4 - 6).   amLODIPine (Norvasc) 5 mg tablet 5/13/2025 Yes   Sig: Take 1 tablet (5 mg) by mouth once daily.   chlorhexidine (Peridex) 0.12 % solution 5/14/2025 No    Sig: Use as directed   chlorthalidone (Hygroton) 25 mg tablet 5/13/2025 Yes   Sig: Take 1 tablet (25 mg) by mouth once daily.   cyclobenzaprine (Flexeril) 10 mg tablet Past Month Yes   Sig: Take 1 tablet (10 mg) by mouth once daily at bedtime.   Patient taking differently: Take 1 tablet (10 mg) by mouth once daily as needed for muscle spasms.   diclofenac sodium 1 % kit Not Taking No   Sig: Apply topically once daily. Apply sparingly to affected area(s)   Patient not taking: Reported on 5/14/2025   ferrous sulfate 325 (65 Fe) MG tablet Past Month Yes   Sig: Take 1 tablet (325 mg) by mouth every other day. Without food to increase absorption   multivitamin with minerals tablet Past Month Yes   Sig: Take 1 tablet by mouth once daily.   nitrofurantoin, macrocrystal-monohydrate, (Macrobid) 100 mg capsule Not Taking No   Sig: Take 1  capsule (100 mg) by mouth once daily.   Patient not taking: Reported on 5/14/2025      Facility-Administered Medications: None        The list below reflects the updated allergy list. Please review each documented allergy for additional clarification and justification.  Allergies  Reviewed by Cristofer Giron on 5/15/2025   No Known Allergies       Sources used to complete the med history include   -Patient   -Medication dispense history     Below are additional concerns with the patient's PTA list.  -Patient was a good historian     Cristofer Giron-PharmD Candidate   Please reach out via [employer] Secure Chat for questions

## 2025-05-16 VITALS
DIASTOLIC BLOOD PRESSURE: 97 MMHG | HEART RATE: 61 BPM | WEIGHT: 242.51 LBS | OXYGEN SATURATION: 98 % | BODY MASS INDEX: 42.97 KG/M2 | HEIGHT: 63 IN | SYSTOLIC BLOOD PRESSURE: 144 MMHG | RESPIRATION RATE: 16 BRPM | TEMPERATURE: 98.2 F

## 2025-05-16 PROBLEM — E87.6 HYPOKALEMIA: Status: ACTIVE | Noted: 2025-05-16

## 2025-05-16 LAB
ALBUMIN SERPL BCP-MCNC: 3.4 G/DL (ref 3.4–5)
ALP SERPL-CCNC: 49 U/L (ref 33–110)
ALT SERPL W P-5'-P-CCNC: 43 U/L (ref 7–45)
ANION GAP SERPL CALCULATED.3IONS-SCNC: 9 MMOL/L (ref 10–20)
AST SERPL W P-5'-P-CCNC: 38 U/L (ref 9–39)
BASOPHILS # BLD AUTO: 0.04 X10*3/UL (ref 0–0.1)
BASOPHILS NFR BLD AUTO: 0.5 %
BILIRUB SERPL-MCNC: 0.5 MG/DL (ref 0–1.2)
BUN SERPL-MCNC: 9 MG/DL (ref 6–23)
CALCIUM SERPL-MCNC: 8.8 MG/DL (ref 8.6–10.3)
CHLORIDE SERPL-SCNC: 105 MMOL/L (ref 98–107)
CO2 SERPL-SCNC: 26 MMOL/L (ref 21–32)
CREAT SERPL-MCNC: 0.65 MG/DL (ref 0.5–1.05)
EGFRCR SERPLBLD CKD-EPI 2021: >90 ML/MIN/1.73M*2
EOSINOPHIL # BLD AUTO: 0.07 X10*3/UL (ref 0–0.7)
EOSINOPHIL NFR BLD AUTO: 0.8 %
ERYTHROCYTE [DISTWIDTH] IN BLOOD BY AUTOMATED COUNT: 15.9 % (ref 11.5–14.5)
GLUCOSE SERPL-MCNC: 90 MG/DL (ref 74–99)
HCT VFR BLD AUTO: 34 % (ref 36–46)
HGB BLD-MCNC: 11.1 G/DL (ref 12–16)
IMM GRANULOCYTES # BLD AUTO: 0.02 X10*3/UL (ref 0–0.7)
IMM GRANULOCYTES NFR BLD AUTO: 0.2 % (ref 0–0.9)
LYMPHOCYTES # BLD AUTO: 2.9 X10*3/UL (ref 1.2–4.8)
LYMPHOCYTES NFR BLD AUTO: 34.1 %
MCH RBC QN AUTO: 26.2 PG (ref 26–34)
MCHC RBC AUTO-ENTMCNC: 32.6 G/DL (ref 32–36)
MCV RBC AUTO: 80 FL (ref 80–100)
MONOCYTES # BLD AUTO: 0.59 X10*3/UL (ref 0.1–1)
MONOCYTES NFR BLD AUTO: 6.9 %
NEUTROPHILS # BLD AUTO: 4.89 X10*3/UL (ref 1.2–7.7)
NEUTROPHILS NFR BLD AUTO: 57.5 %
NRBC BLD-RTO: 0 /100 WBCS (ref 0–0)
PLATELET # BLD AUTO: 354 X10*3/UL (ref 150–450)
POTASSIUM SERPL-SCNC: 3.2 MMOL/L (ref 3.5–5.3)
PROT SERPL-MCNC: 6.7 G/DL (ref 6.4–8.2)
RBC # BLD AUTO: 4.24 X10*6/UL (ref 4–5.2)
SODIUM SERPL-SCNC: 137 MMOL/L (ref 136–145)
WBC # BLD AUTO: 8.5 X10*3/UL (ref 4.4–11.3)

## 2025-05-16 PROCEDURE — 2500000004 HC RX 250 GENERAL PHARMACY W/ HCPCS (ALT 636 FOR OP/ED): Performed by: SURGERY

## 2025-05-16 PROCEDURE — 7100000011 HC EXTENDED STAY RECOVERY HOURLY - NURSING UNIT

## 2025-05-16 PROCEDURE — 80053 COMPREHEN METABOLIC PANEL: CPT | Performed by: SURGERY

## 2025-05-16 PROCEDURE — 99024 POSTOP FOLLOW-UP VISIT: CPT | Performed by: PHYSICIAN ASSISTANT

## 2025-05-16 PROCEDURE — 94760 N-INVAS EAR/PLS OXIMETRY 1: CPT

## 2025-05-16 PROCEDURE — 2500000001 HC RX 250 WO HCPCS SELF ADMINISTERED DRUGS (ALT 637 FOR MEDICARE OP): Performed by: PHYSICIAN ASSISTANT

## 2025-05-16 PROCEDURE — 96372 THER/PROPH/DIAG INJ SC/IM: CPT | Performed by: SURGERY

## 2025-05-16 PROCEDURE — 36415 COLL VENOUS BLD VENIPUNCTURE: CPT | Performed by: SURGERY

## 2025-05-16 PROCEDURE — 2500000001 HC RX 250 WO HCPCS SELF ADMINISTERED DRUGS (ALT 637 FOR MEDICARE OP): Performed by: SURGERY

## 2025-05-16 PROCEDURE — 85025 COMPLETE CBC W/AUTO DIFF WBC: CPT | Performed by: SURGERY

## 2025-05-16 RX ORDER — POTASSIUM CHLORIDE 1.5 G/1.58G
20 POWDER, FOR SOLUTION ORAL 2 TIMES DAILY
Status: DISCONTINUED | OUTPATIENT
Start: 2025-05-16 | End: 2025-05-16 | Stop reason: HOSPADM

## 2025-05-16 RX ORDER — PANTOPRAZOLE SODIUM 40 MG/1
40 TABLET, DELAYED RELEASE ORAL
Status: DISCONTINUED | OUTPATIENT
Start: 2025-05-16 | End: 2025-05-16 | Stop reason: HOSPADM

## 2025-05-16 RX ORDER — ACETAMINOPHEN 160 MG/5ML
650 SOLUTION ORAL EVERY 4 HOURS PRN
Status: DISCONTINUED | OUTPATIENT
Start: 2025-05-16 | End: 2025-05-16 | Stop reason: HOSPADM

## 2025-05-16 RX ADMIN — AMLODIPINE BESYLATE 5 MG: 5 TABLET ORAL at 09:56

## 2025-05-16 RX ADMIN — OXYCODONE HYDROCHLORIDE 5 MG: 5 SOLUTION ORAL at 04:18

## 2025-05-16 RX ADMIN — HEPARIN SODIUM 7500 UNITS: 5000 INJECTION INTRAVENOUS; SUBCUTANEOUS at 06:38

## 2025-05-16 RX ADMIN — HEPARIN SODIUM 7500 UNITS: 5000 INJECTION INTRAVENOUS; SUBCUTANEOUS at 16:04

## 2025-05-16 RX ADMIN — ACETAMINOPHEN 650 MG: 160 SOLUTION ORAL at 16:36

## 2025-05-16 RX ADMIN — OXYCODONE HYDROCHLORIDE 5 MG: 5 SOLUTION ORAL at 10:23

## 2025-05-16 RX ADMIN — DOCUSATE SODIUM 100 MG: 50 LIQUID ORAL at 09:56

## 2025-05-16 RX ADMIN — PANTOPRAZOLE SODIUM 40 MG: 40 TABLET, DELAYED RELEASE ORAL at 06:38

## 2025-05-16 ASSESSMENT — PAIN DESCRIPTION - LOCATION
LOCATION: ABDOMEN

## 2025-05-16 ASSESSMENT — COGNITIVE AND FUNCTIONAL STATUS - GENERAL
DAILY ACTIVITIY SCORE: 24
MOBILITY SCORE: 24

## 2025-05-16 ASSESSMENT — PAIN DESCRIPTION - DESCRIPTORS
DESCRIPTORS: ACHING
DESCRIPTORS: THROBBING
DESCRIPTORS: ACHING

## 2025-05-16 ASSESSMENT — PAIN SCALES - GENERAL
PAINLEVEL_OUTOF10: 7
PAINLEVEL_OUTOF10: 8
PAINLEVEL_OUTOF10: 0 - NO PAIN
PAINLEVEL_OUTOF10: 7
PAINLEVEL_OUTOF10: 0 - NO PAIN
PAINLEVEL_OUTOF10: 0 - NO PAIN

## 2025-05-16 ASSESSMENT — PAIN DESCRIPTION - ORIENTATION
ORIENTATION: MID
ORIENTATION: MID

## 2025-05-16 ASSESSMENT — PAIN - FUNCTIONAL ASSESSMENT
PAIN_FUNCTIONAL_ASSESSMENT: 0-10
PAIN_FUNCTIONAL_ASSESSMENT: FLACC (FACE, LEGS, ACTIVITY, CRY, CONSOLABILITY)

## 2025-05-16 ASSESSMENT — PAIN SCALES - WONG BAKER
WONGBAKER_NUMERICALRESPONSE: NO HURT
WONGBAKER_NUMERICALRESPONSE: NO HURT

## 2025-05-16 NOTE — NURSING NOTE
End of shift, bedside shift report given. Patient standing in room, with call light within reach. Patient verbalizes no new concerns at this time.

## 2025-05-16 NOTE — NURSING NOTE
Pt resting in bed with eyes closed, breathing even and unlabored. NAD noted. Call light and belongings in reach. Continuing to monitor

## 2025-05-16 NOTE — NURSING NOTE
Surgery PA in to see pt at this time. Orders will be reconciled with PO liquid tylenol for pain added to regimen. Pharmacy to be secure messaged to bring up pts meds before close. Care ongoing.

## 2025-05-16 NOTE — PROGRESS NOTES
05/16/25 0831   Discharge Planning   Expected Discharge Disposition Home   Does the patient need discharge transport arranged? No     Patient will discharge home with no skilled needs when medically ready

## 2025-05-16 NOTE — CARE PLAN
The patient's goals for the shift include Walk around later.    The clinical goals for the shift include incraese ambulation and intake    Over the shift, the patient did not make progress toward the following goals. Barriers to progression include na. Recommendations to address these barriers include na.      Problem: Pain - Adult  Goal: Verbalizes/displays adequate comfort level or baseline comfort level  Outcome: Progressing     Problem: Safety - Adult  Goal: Free from fall injury  Outcome: Progressing     Problem: Discharge Planning  Goal: Discharge to home or other facility with appropriate resources  Outcome: Progressing     Problem: Chronic Conditions and Co-morbidities  Goal: Patient's chronic conditions and co-morbidity symptoms are monitored and maintained or improved  Outcome: Progressing     Problem: Nutrition  Goal: Nutrient intake appropriate for maintaining nutritional needs  Outcome: Progressing     Problem: Pain  Goal: Takes deep breaths with improved pain control throughout the shift  Outcome: Progressing  Goal: Turns in bed with improved pain control throughout the shift  Outcome: Progressing  Goal: Walks with improved pain control throughout the shift  Outcome: Progressing  Goal: Performs ADL's with improved pain control throughout shift  Outcome: Progressing  Goal: Participates in PT with improved pain control throughout the shift  Outcome: Progressing  Goal: Free from opioid side effects throughout the shift  Outcome: Progressing  Goal: Free from acute confusion related to pain meds throughout the shift  Outcome: Progressing     Problem: Fall/Injury  Goal: Not fall by end of shift  Outcome: Progressing  Goal: Be free from injury by end of the shift  Outcome: Progressing  Goal: Verbalize understanding of personal risk factors for fall in the hospital  Outcome: Progressing  Goal: Verbalize understanding of risk factor reduction measures to prevent injury from fall in the home  Outcome:  Progressing  Goal: Use assistive devices by end of the shift  Outcome: Progressing  Goal: Pace activities to prevent fatigue by end of the shift  Outcome: Progressing

## 2025-05-16 NOTE — NURSING NOTE
IV to LAC infiltrated, L arm swollen, painful. IV removed and IVF paused. Pt asking if we can not place new IV as she is hopeful to go home today. IV med not admin d/t loss of access and pt request. Will Message Dr Camacho to notify and ask about leaving pt without IV access

## 2025-05-16 NOTE — PROGRESS NOTES
"Анна Bird is a 40 y.o. female on day 1 of admission presenting with Morbid obesity (Multi). S/p robotic sleeve gastrectomy and hiatal hernia repair 5/14.    Subjective   Doing well overall  Appetite is fair, tolerating bariatric clears and fulls  Has been ambulatory, has some incisional and LUQ discomfort  No difficulty voiding       Objective     Physical Exam  Constitutional:       General: She is not in acute distress.     Appearance: She is not toxic-appearing.   HENT:      Head: Normocephalic and atraumatic.      Mouth/Throat:      Mouth: Mucous membranes are moist.      Pharynx: Oropharynx is clear.   Eyes:      General: No scleral icterus.  Cardiovascular:      Rate and Rhythm: Normal rate and regular rhythm.   Pulmonary:      Effort: Pulmonary effort is normal. No respiratory distress.      Breath sounds: No wheezing.   Abdominal:      Palpations: Abdomen is soft.      Comments: Incision sites intact, no surrounding erythema or active drainage. Mild kathryn-incisional tenderness to palpation   Musculoskeletal:      Right lower leg: No edema.      Left lower leg: No edema.   Skin:     General: Skin is warm and dry.      Comments: Aforementioned skin changes   Neurological:      Mental Status: She is alert and oriented to person, place, and time.   Psychiatric:         Mood and Affect: Mood normal.         Behavior: Behavior normal.         Last Recorded Vitals  Blood pressure (!) 144/97, pulse 61, temperature 36.8 °C (98.2 °F), temperature source Oral, resp. rate 16, height 1.6 m (5' 3\"), weight 110 kg (242 lb 8.1 oz), last menstrual period 04/28/2025, SpO2 98%.  Intake/Output last 3 Shifts:  I/O last 3 completed shifts:  In: 2674.2 (24.3 mL/kg) [P.O.:970; I.V.:1254.2 (11.4 mL/kg); IV Piggyback:450]  Out: 3200 (29.1 mL/kg) [Urine:3200 (0.8 mL/kg/hr)]  Weight: 110 kg     Relevant Results  Lab Results   Component Value Date    WBC 8.5 05/16/2025    HGB 11.1 (L) 05/16/2025    HCT 34.0 (L) 05/16/2025    MCV " 80 05/16/2025     05/16/2025      Lab Results   Component Value Date    GLUCOSE 90 05/16/2025    CALCIUM 8.8 05/16/2025     05/16/2025    K 3.2 (L) 05/16/2025    CO2 26 05/16/2025     05/16/2025    BUN 9 05/16/2025    CREATININE 0.65 05/16/2025           Assessment & Plan  Morbid obesity (Multi)  Morbid obesity with BMI of 45.0-49.9, adult (Multi)    Continue bariatric diet/monitoring  Mobilize  IS  Hypokalemia  In the setting of decreased oral intake  Repletion ordered      DVT ppx: UFH    I spent 25 minutes in the professional and overall care of this patient.    Case and plan of care discussed with attending Dr. Janice Beaulieu PAVIVIEN

## 2025-05-16 NOTE — NURSING NOTE
AVS printed and handed to them. Pt has no IV access. Pt to pick-up medications from [facility] in [place]. Pt has no further questions, awaiting ride from daughter who should be here by 6:45 pm. Care ongoing.

## 2025-05-16 NOTE — NURSING NOTE
Pt reports gas pain to abd and reports she has not passed gas since surgery. Encouraged pt to ambulate to help with bowle motility/ gas reabsorption. Pt declines interventions for pain beyond her scheduled medications at this time. Continuing to monitor

## 2025-05-16 NOTE — NURSING NOTE
Assumed care of pt. BSSR complete. Pt spoken to about importance of getting up and moving and working on intake today. Pt resting in bed at this time. RR even and unlabored on RA. Care ongoing.

## 2025-05-23 DIAGNOSIS — R10.9 FLANK PAIN: ICD-10-CM

## 2025-05-23 LAB
LAB AP ASR DISCLAIMER: NORMAL
LABORATORY COMMENT REPORT: NORMAL
PATH REPORT.FINAL DX SPEC: NORMAL
PATH REPORT.GROSS SPEC: NORMAL
PATH REPORT.RELEVANT HX SPEC: NORMAL
PATH REPORT.TOTAL CANCER: NORMAL

## 2025-05-23 NOTE — PROGRESS NOTES
Spoke with patient today and she reported she has been having some intermittent flank pain over the last week that became more consistent last night on the left side.  Spoke with Dr. Camacho who would like her to get a urinalysis as she also reports having some urinary frequency.  Patient verbalized understanding and we will look out for her results.

## 2025-05-28 ENCOUNTER — TELEPHONE (OUTPATIENT)
Dept: SURGERY | Facility: CLINIC | Age: 40
End: 2025-05-28
Payer: COMMERCIAL

## 2025-05-29 ENCOUNTER — APPOINTMENT (OUTPATIENT)
Dept: SURGERY | Facility: CLINIC | Age: 40
End: 2025-05-29
Payer: COMMERCIAL

## 2025-05-29 VITALS — BODY MASS INDEX: 39.86 KG/M2 | WEIGHT: 225 LBS

## 2025-05-29 DIAGNOSIS — Z98.84 BARIATRIC SURGERY STATUS: ICD-10-CM

## 2025-05-29 DIAGNOSIS — E66.813 OBESITY, CLASS III, BMI 40-49.9 (MORBID OBESITY): Primary | ICD-10-CM

## 2025-05-29 DIAGNOSIS — E66.01 MORBID OBESITY (MULTI): ICD-10-CM

## 2025-05-29 PROCEDURE — 99024 POSTOP FOLLOW-UP VISIT: CPT | Performed by: SURGERY

## 2025-05-29 NOTE — PATIENT INSTRUCTIONS
2 weeks:    You are doing GREAT! Things will get easier over time.  Work your way off the shakes and get all nutrition through food.     You may transition to pureed foods; spreadable consistency. Then you can move to soft    Continue to aim for 60 grams of protein and 60 oz of water daily.    Increase the duration and frequency of your exercise regimen as you are able.Your goal is 1 hour/day.     Continue to take vitamin supplements as directed.    Remember to take your multivitamins 2 times daily, once in the morning and once in the evening    Take your calcium 2-3 times daily, at least 2 hours apart from the multivitamin.    Come to support groups.     See the dietician as needed.    Follow-up in 4 weeks.

## 2025-05-29 NOTE — PROGRESS NOTES
BARIATRIC SURGERY CLINIC  FOLLOW UP NOTE      Name: Анна Bird  MRN: [id number]      Index Surgery  Date of Surgery: 5/14/25   Surgeon: Kristopher Camacho MD  Surgical Procedure: Laparoscopic sleeve gastrectomy  31752 and Robotic-assisted surgery   Initial weight: 253 lb  Current Weight: 225 lbs  Total Weight Loss: 28 lbs      Visit:  2 weeks  Today's Visit:   Wt Readings from Last 1 Encounters:   05/29/25 102 kg (225 lb)    Body mass index is 39.86 kg/m².   Last Visit:    Wt Readings from Last 3 Encounters:   05/29/25 102 kg (225 lb)   05/15/25 110 kg (242 lb 8.1 oz)   05/07/25 116 kg (254 lb 11.2 oz)       HPI: Анна is a 40 year old female here for her 2 week post op visit.  She has been doing well since the surgery.  She is getting in over 64 ounces of fluid, and 60 g of protein.  She denies much abdominal pain.  She does have occasional dizziness and lightheadedness if she stands up too quickly.  She has not passed out.  She is moving her bowels.    DIET INTAKE: Pureed      DAILY SUPPLEMENTS:  Calcium: Calcium Citrate w/ vitamin D (1200 - 1500mg)  Multivitamin & Minerals: 2 per day  Iron Supplement: included in multi-vitamin  Vitamin B12: 1000 mcg  Vitamin D3: 3000 units    PPI:Omeprazole     EXERCISE: Walking         Current Medications[1]    Comorbidities:  No problem-specific Assessment & Plan notes found for this encounter.        REVIEW OF SYSTEMS:  CONSTITUTIONAL: Patient denies fevers, chills, sweats and weight changes.  EYES: Patient denies any visual symptoms.  EARS, NOSE, AND THROAT: No difficulties with hearing. No symptoms of rhinitis or sore throat.  CARDIOVASCULAR: Patient denies chest pains, palpitations, orthopnea and paroxysmal nocturnal dyspnea.  RESPIRATORY: No dyspnea on exertion, no wheezing or cough.  GI: No nausea, vomiting, diarrhea, constipation, abdominal pain, hematochezia or melena.  : No urinary hesitancy or dribbling. No nocturia or urinary frequency. No abnormal  urethral discharge.  MUSCULOSKELETAL: No myalgias or arthralgias.  NEUROLOGIC: No chronic headaches, no seizures. Patient denies numbness, tingling or weakness.  PSYCHIATRIC: Patient denies problems with mood disturbance. No problems with anxiety.  ENDOCRINE: No excessive urination or excessive thirst.  DERMATOLOGIC: Patient denies any rashes or skin changes.    PHYSICAL EXAM:  Wt 102 kg (225 lb)   LMP 04/28/2025 (Approximate)   BMI 39.86 kg/m²   Gen: Alert and Oriented, no distress  Pulm: Breathing Comfortably on room air   CV: RRR, non-tachycardic   Abd: Soft, appropriately tender to palpation.  Incisions well approximated        A/P: The patient is a 40-year-old woman who is 2 weeks postop from a robotic sleeve gastrectomy and hiatal hernia repair.  She has been doing well since the surgery.  No major issues.  She is getting her fluids and and her protein goals met.    Doing well and tolerating diet    Energy: Energy good    Weight loss: Steady    Recommendations: Fluid intake 60 oz/day and Protein 60 g/day    Follow up: 6 weeks    Kristopher Camacho MD                      [1]   Current Outpatient Medications   Medication Sig Dispense Refill    acetaminophen (Tylenol) 500 mg tablet Take 2 tablets (1,000 mg) by mouth every 8 hours if needed for mild pain (1 - 3) or moderate pain (4 - 6).      amLODIPine (Norvasc) 5 mg tablet Take 1 tablet (5 mg) by mouth once daily. 90 tablet 3    chlorthalidone (Hygroton) 25 mg tablet Take 1 tablet (25 mg) by mouth once daily. 90 tablet 3    cyclobenzaprine (Flexeril) 10 mg tablet Take 1 tablet (10 mg) by mouth once daily at bedtime. (Patient taking differently: Take 1 tablet (10 mg) by mouth once daily as needed for muscle spasms.) 90 tablet 11    diclofenac sodium 1 % kit Apply topically once daily. Apply sparingly to affected area(s) (Patient not taking: Reported on 5/14/2025)      DULoxetine (Cymbalta) 30 mg DR capsule Take 1 capsule (30 mg) by mouth once daily. Do not  crush or chew. (Patient not taking: Reported on 5/15/2025) 90 capsule 1    nitrofurantoin, macrocrystal-monohydrate, (Macrobid) 100 mg capsule Take 1 capsule (100 mg) by mouth once daily. (Patient not taking: Reported on 5/14/2025)      omeprazole (PriLOSEC) 40 mg DR capsule Take 1 capsule (40 mg) by mouth once daily. Open capsule. Do not crush, chew, or dissolve beads. 90 capsule 1    ondansetron ODT (Zofran-ODT) 4 mg disintegrating tablet Dissolve 1 tablet (4 mg) in the mouth every 8 hours if needed for vomiting for up to 14 days. 20 tablet 1     No current facility-administered medications for this visit.

## 2025-05-29 NOTE — PROGRESS NOTES
Bariatric Post-Op Follow Up Nutrition Assessment    Surgery Date: 5/14/25   Surgeon: Dr. Camacho   Procedure: VSG     ASSESSMENT:  Current weight: 225 lbs Ht: 63 in. BMI: 39.86  Previous weight: 249 lbs   Initial start weight: 253 lbs   EBW: 112 lbs   Total weight change: 28 lbs   %EBW Lost: 25    PROGRESS:  Nutrition Interventions from last encounter (4/25/25):   1. Drink 64 oz of fluid daily  2. Drink enough protein shakes to meet your goal of 60-70 g of protein per day  3. Take a daily chewable MVI containing iron.   4. Get up and walk frequently throughout the day.     CHANGES IN TREATMENT:   Patient met goals:  Yes   Fluid intake: ~80 oz daily   Protein intake: 60 grams daily from 2 protein shakes     Vitamins: 2 chewable [employer] MVI daily  Physical Activity: walking     READINESS TO LEARN:  Motivation to learn: Interested  Understanding of instruction: Good  Anticipated Compliance: Good  Family Support: Unable to assess-family not present     Patient presents with post-op weight loss surgery. The pt is 2 week post op. Patient has lost 28 pounds since initial assessment accounting for 25% loss excess body weight. Patient is tolerating the full liquid diet. Fluid consumption is adequate. Protein intake is adequate for post-op individual. Patient is supplementing with recommended vitamin/minerals. Pt states no concerns and/or difficulties.   Reviewed the stage 3, puree diet. Pt will begin this diet today, 5/29. Reminded pt to eat slowly, chew thoroughly and to try one new food at a time.     Nutrition Diagnosis:  1. Increased protein and nutrition needs related to altered GI function as evidenced by pt s/p bariatric surgery.   2. Food- and nutrition-related knowledge deficit related to lack of prior exposure to surgical weight loss information as evidenced by diet recall.     Nutrition Interventions:  1. Modify type and amount of food and nutrients within meals and snacks.  2. Comprehensive Nutrition  "Education  -Nutrition education materials: diet stage 3 & 4 education handouts.     Recommendations:  1. Continue to consume a minimum of 64 fluid oz daily.   2. Continue to drink your protein shakes to meet your goal of 60-70 g of protein per day. Begin measuring how much protein you can eat on the puree diet so that you know when to start weaning off of your protein shakes.   3. Advance to the puree diet for 2 weeks. Pt will not start the soft food diet until instructed to do so.   4. Begin to follow the \"30-30-30 rule\". Do not drink 30 min before, during the meal and for 30 minutes after the meal when you start the puree diet  5. Continue to take daily chewable MVI containing iron.   6. Continue to walk.   7. Optional: attend monthly support groups as needed.     Nutrition Monitoring and Evaluation:   1-2 pounds weight loss per week  Criteria: weight check, food recall  Need for Follow-up: in 2 weeks for 4 week BELLA Ramírez RD, LDN  Bariatric Dietitian  [phone]   "

## 2025-06-11 ENCOUNTER — APPOINTMENT (OUTPATIENT)
Dept: SURGERY | Facility: CLINIC | Age: 40
End: 2025-06-11
Payer: COMMERCIAL

## 2025-06-11 NOTE — PROGRESS NOTES
Bariatric Post-Op Follow Up Nutrition Assessment- 4 wk check in     Surgery Date: 5/14/25   Surgeon: Dr. Camacho   Procedure: VSG     Анна is following up virtually. She reports doing well with her diet and is tolerating all foods and beverages. She reports consuming at least 60 oz of fluids from plain water and crystal light. She is consuming 60 grams of protein from 2 protein shakes daily, plus some pureed meals such as eggs, and cottage cheese. Анна did note to have a few lbs of weight gain between the last 2 weeks. We reviewed weaning off of the protein shakes as Анна is meeting the protein goal from food sources. Pt showed good understanding.     Reviewed soft food diet progression which pt can begin today, 6/11. I reviewed which foods patient can start to incorporate and which foods to avoid. Encouraged continuing to prioritize fluids and ensure getting in at least 60 oz fluid daily. Reviewed the importance of slowly eating and stop when starting to feel full. Patient was instructed to follow the soft diet for a full 2 weeks, and to not progress diet until instructed to do so. Patient shows good understanding.         Mirela Ramírez RD, LDN  Bariatric Dietitian  [phone]

## 2025-06-20 ENCOUNTER — APPOINTMENT (OUTPATIENT)
Dept: SURGERY | Facility: CLINIC | Age: 40
End: 2025-06-20
Payer: COMMERCIAL

## 2025-06-20 VITALS — BODY MASS INDEX: 39.87 KG/M2 | HEIGHT: 63 IN | WEIGHT: 225 LBS

## 2025-06-20 NOTE — PROGRESS NOTES
"Bariatric Post-Op Follow Up Nutrition Assessment    Surgery Date: 5/14/25   Surgeon: Dr. Camacho   Procedure: VSG     ASSESSMENT:  Current weight: 225 lbs Ht: 63 in. BMI: 39.86  Previous weight: 249 lbs   Initial start weight: 253 lbs   EBW: 112 lbs   Total weight change: 28 lbs   %EBW Lost: 25    PROGRESS:  Nutrition Interventions for last encounter (5/29/25):   1. Continue to consume a minimum of 64 fluid oz daily.   2. Continue to drink your protein shakes to meet your goal of 60-70 g of protein per day. Begin measuring how much protein you can eat on the puree diet so that you know when to start weaning off of your protein shakes.   3. Advance to the puree diet for 2 weeks. Pt will not start the soft food diet until instructed to do so.   4. Begin to follow the \"30-30-30 rule\". Do not drink 30 min before, during the meal and for 30 minutes after the meal when you start the puree diet  5. Continue to take daily chewable MVI containing iron.   6. Continue to walk.   7. Optional: attend monthly support groups as needed.     CHANGES IN TREATMENT:  Patient met goals: Yes    Fluids: pt reports consuming more than 64 fl oz daily.   Protein: estimated at least 60 grams daily. Sources include 1 protein shake daily, yogurt, beans, eggs, ground turkey/chicken and tuna.     Following the \"30-30-30\" rule? Yes   Vitamins: 2 chewable [place] MVI   Physical Activity: walking     READINESS TO LEARN:  Motivation to learn: Interested      Understanding of instruction: Good    Anticipated Compliance: Good   Family Support: Unable to assess-family not present     Patient presents with post-op weight loss surgery. Patient has lost 28 pounds since initial assessment accounting for 25% loss excess body weight. Pt is tolerating a soft food diet without difficulty.    Fluid consumption is adequate. Protein intake is adequate for post-op individual. Patient is supplementing recommended vitamin/minerals. Pt states some concern with " "weight loss stall.   Discussed the regular diet progression with restrictions. Pt was instructed to begin this diet on 6/25. Reminded pt to eat slowly, chew thoroughly and to try one new food at a time.     Nutrition Diagnosis:  1. Increased protein and nutrition needs related to altered GI function as evidenced by pt. s/p bariatric surgery.   2. Food- and nutrition-related knowledge deficit related to lack of prior exposure to surgical weight loss information as evidenced by diet recall.      Nutrition Interventions:  1. Modify type and amount of food and nutrients within meals and snacks.  2. Comprehensive Nutrition Education  -Nutrition education materials: emailed the regular food power point and education on vitamins.      Recommendations:  1. Eat 60-70 g of protein per day  2. Drink 64 oz. of zero calorie beverages per day  3. Continue to follow the \"30-30-30\" rule  4. Increase intensity and duration of exercise  5. Advance to a regular diet as tolerated. Continue to avoid certain food/beverage items as reviewed.   6. Eat slowly, chew thoroughly  7. Try one new food at a time.   8. Modify vitamins/minerals to meet ASMBS guidelines. Vitamin education was given and emailed.   9. Optional: attend monthly support groups as needed.      Nutrition Monitoring and Evaluation:   1-2 pounds weight loss per week  Criteria: weight check, food recall  Need for Follow-up: in 6 weeks       Mirela Ramírez RD, LDN  Bariatric Dietitian  [phone]   "

## 2025-06-23 ENCOUNTER — APPOINTMENT (OUTPATIENT)
Dept: SURGERY | Facility: CLINIC | Age: 40
End: 2025-06-23
Payer: COMMERCIAL

## 2025-06-23 DIAGNOSIS — Z98.84 S/P LAPAROSCOPIC SLEEVE GASTRECTOMY: ICD-10-CM

## 2025-06-23 PROCEDURE — 99024 POSTOP FOLLOW-UP VISIT: CPT | Performed by: SURGERY

## 2025-06-23 NOTE — PATIENT INSTRUCTIONS
6 Weeks P/O visit  You are doing great six weeks   Remember the rules of 60- continue to aim for 60 grams of protein and 60 oz of water daily.    Remember to increase your fluids and protein to goals  Do not eat and drink at the same time.   Advance your diet according to the diet guidelines to regular food.   See the dietician as scheduled regularly.    Slowly increase your exercise and activity, as you get all your protein you will be able to have more energy.   Take your omeprazole, open the capsule and sprinkle onto applesauce. Do not swallow whole.  Take your Multivitamin, B12 and calcium tablets.   Follow-up in 6 weeks for your 3 month visit, we will draw labs at that time.

## 2025-06-23 NOTE — PROGRESS NOTES
[facility]  FOLLOW UP NOTE      Name: Анна Bird  MRN: [id number]      Index Surgery  Date of Surgery: 5/14/2025   Surgeon: Kristopher Camacho MD  Surgical Procedure: Laparoscopic sleeve gastrectomy  [address] and Robotic-assisted surgery   Initial weight: 253 lbs  Current Weight: 225 lbs  Total Weight Loss:  28 lbs      Visit:  6 weeks  Today's Visit:   Wt Readings from Last 1 Encounters:   06/20/25 102 kg (225 lb)    There is no height or weight on file to calculate BMI.   Last Visit:    Wt Readings from Last 3 Encounters:   06/20/25 102 kg (225 lb)   05/29/25 102 kg (225 lb)   05/15/25 110 kg (242 lb 8.1 oz)       HPI: Анна is a 40 year old female here for her 6 week post op visit.  She has been doing well since the surgery.  She is tolerating a regular, soft diet.  She is moving her bowels.  She denies any abdominal pain.    DIET INTAKE: Regular food        DAILY SUPPLEMENTS:  Calcium: Calcium Citrate w/ vitamin D (1200 - 1500mg)  Multivitamin & Minerals: 2 per day  Iron Supplement: included in multi-vitamin  Vitamin B12: 1000 mcg  Vitamin D3: 3000 units    PPI:Omeprazole     EXERCISE: Walking        Current Medications[1]    Comorbidities:  No problem-specific Assessment & Plan notes found for this encounter.        REVIEW OF SYSTEMS:  CONSTITUTIONAL: Patient denies fevers, chills, sweats and weight changes.  EYES: Patient denies any visual symptoms.  EARS, NOSE, AND THROAT: No difficulties with hearing. No symptoms of rhinitis or sore throat.  CARDIOVASCULAR: Patient denies chest pains, palpitations, orthopnea and paroxysmal nocturnal dyspnea.  RESPIRATORY: No dyspnea on exertion, no wheezing or cough.  GI: No nausea, vomiting, diarrhea, constipation, abdominal pain, hematochezia or melena.  : No urinary hesitancy or dribbling. No nocturia or urinary frequency. No abnormal urethral discharge.  MUSCULOSKELETAL: No myalgias or arthralgias.  NEUROLOGIC: No chronic headaches, no seizures.  Patient denies numbness, tingling or weakness.  PSYCHIATRIC: Patient denies problems with mood disturbance. No problems with anxiety.  ENDOCRINE: No excessive urination or excessive thirst.  DERMATOLOGIC: Patient denies any rashes or skin changes.    PHYSICAL EXAM:  Gen: Alert and Oriented, no distress  Pulm: Breathing Comfortably on room air   CV: RRR, non-tachycardic   Abd: Soft, appropriately tender to palpation.  Incisions well approximated        A/P: The patient is a 39 yo woman who is 6 weeks postop from a robotic sleeve gastrectomy.  She has been doing well since the surgery.  No major issues.  She is tolerating her diet, and getting adequate fluid intake.    Doing well and tolerating diet    Energy: Energy good    Weight loss: Steady    Recommendations: Fluid intake 60 oz/day and Protein 60 g/day    Follow up: 3 months    Kristopher Camacho MD                  [1]   Current Outpatient Medications   Medication Sig Dispense Refill    acetaminophen (Tylenol) 500 mg tablet Take 2 tablets (1,000 mg) by mouth every 8 hours if needed for mild pain (1 - 3) or moderate pain (4 - 6).      amLODIPine (Norvasc) 5 mg tablet Take 1 tablet (5 mg) by mouth once daily. 90 tablet 3    chlorthalidone (Hygroton) 25 mg tablet Take 1 tablet (25 mg) by mouth once daily. 90 tablet 3    cyclobenzaprine (Flexeril) 10 mg tablet Take 1 tablet (10 mg) by mouth once daily at bedtime. (Patient taking differently: Take 1 tablet (10 mg) by mouth once daily as needed for muscle spasms.) 90 tablet 11    diclofenac sodium 1 % kit Apply topically once daily. Apply sparingly to affected area(s) (Patient not taking: Reported on 5/14/2025)      DULoxetine (Cymbalta) 30 mg DR capsule Take 1 capsule (30 mg) by mouth once daily. Do not crush or chew. (Patient not taking: Reported on 5/15/2025) 90 capsule 1    nitrofurantoin, macrocrystal-monohydrate, (Macrobid) 100 mg capsule Take 1 capsule (100 mg) by mouth once daily. (Patient not taking: Reported  on 5/14/2025)      omeprazole (PriLOSEC) 40 mg DR capsule Take 1 capsule (40 mg) by mouth once daily. Open capsule. Do not crush, chew, or dissolve beads. 90 capsule 1     No current facility-administered medications for this visit.

## 2025-06-24 ENCOUNTER — APPOINTMENT (OUTPATIENT)
Dept: SURGERY | Facility: HOSPITAL | Age: 40
End: 2025-06-24
Payer: COMMERCIAL

## 2025-09-02 ENCOUNTER — APPOINTMENT (OUTPATIENT)
Facility: HOSPITAL | Age: 40
End: 2025-09-02
Payer: COMMERCIAL

## 2025-09-05 ENCOUNTER — OFFICE VISIT (OUTPATIENT)
Facility: HOSPITAL | Age: 40
End: 2025-09-05
Payer: COMMERCIAL

## 2025-09-05 VITALS
SYSTOLIC BLOOD PRESSURE: 131 MMHG | TEMPERATURE: 97 F | HEART RATE: 69 BPM | HEIGHT: 64 IN | WEIGHT: 222 LBS | BODY MASS INDEX: 37.9 KG/M2 | DIASTOLIC BLOOD PRESSURE: 80 MMHG | OXYGEN SATURATION: 98 %

## 2025-09-05 DIAGNOSIS — Z11.3 ROUTINE SCREENING FOR STI (SEXUALLY TRANSMITTED INFECTION): ICD-10-CM

## 2025-09-05 DIAGNOSIS — Z12.31 ENCOUNTER FOR SCREENING MAMMOGRAM FOR MALIGNANT NEOPLASM OF BREAST: ICD-10-CM

## 2025-09-05 DIAGNOSIS — Z98.84 BARIATRIC SURGERY STATUS: ICD-10-CM

## 2025-09-05 DIAGNOSIS — E55.9 VITAMIN D DEFICIENCY: ICD-10-CM

## 2025-09-05 DIAGNOSIS — I10 ESSENTIAL HYPERTENSION: ICD-10-CM

## 2025-09-05 DIAGNOSIS — Z59.41 FOOD INSECURITY: ICD-10-CM

## 2025-09-05 DIAGNOSIS — E53.8 B12 DEFICIENCY: ICD-10-CM

## 2025-09-05 DIAGNOSIS — E78.5 HYPERLIPIDEMIA, UNSPECIFIED HYPERLIPIDEMIA TYPE: ICD-10-CM

## 2025-09-05 DIAGNOSIS — M48.07 SPINAL STENOSIS OF LUMBOSACRAL REGION: ICD-10-CM

## 2025-09-05 DIAGNOSIS — Z11.59 NEED FOR HEPATITIS B SCREENING TEST: ICD-10-CM

## 2025-09-05 DIAGNOSIS — Z00.00 HEALTHCARE MAINTENANCE: Primary | ICD-10-CM

## 2025-09-05 DIAGNOSIS — D50.0 IRON DEFICIENCY ANEMIA DUE TO CHRONIC BLOOD LOSS: ICD-10-CM

## 2025-09-05 DIAGNOSIS — Z11.59 NEED FOR HEPATITIS C SCREENING TEST: ICD-10-CM

## 2025-09-05 DIAGNOSIS — E66.01 MORBID OBESITY (MULTI): ICD-10-CM

## 2025-09-05 PROCEDURE — 99212 OFFICE O/P EST SF 10 MIN: CPT

## 2025-09-05 RX ORDER — CYCLOBENZAPRINE HCL 10 MG
10 TABLET ORAL DAILY PRN
Qty: 30 TABLET | Refills: 2 | Status: SHIPPED | OUTPATIENT
Start: 2025-09-05

## 2025-09-05 RX ORDER — LANOLIN ALCOHOL/MO/W.PET/CERES
1000 CREAM (GRAM) TOPICAL DAILY
COMMUNITY

## 2025-09-05 RX ORDER — AMLODIPINE BESYLATE 10 MG/1
10 TABLET ORAL DAILY
Qty: 30 TABLET | Refills: 1 | Status: SHIPPED | OUTPATIENT
Start: 2025-09-05 | End: 2025-11-04

## 2025-09-05 RX ORDER — FERROUS SULFATE 325(65) MG
325 TABLET ORAL
COMMUNITY

## 2025-09-05 SDOH — ECONOMIC STABILITY: FOOD INSECURITY: WITHIN THE PAST 12 MONTHS, YOU WORRIED THAT YOUR FOOD WOULD RUN OUT BEFORE YOU GOT MONEY TO BUY MORE.: SOMETIMES TRUE

## 2025-09-05 SDOH — ECONOMIC STABILITY: FOOD INSECURITY: WITHIN THE PAST 12 MONTHS, THE FOOD YOU BOUGHT JUST DIDN'T LAST AND YOU DIDN'T HAVE MONEY TO GET MORE.: SOMETIMES TRUE

## 2025-09-05 SDOH — ECONOMIC STABILITY - FOOD INSECURITY: FOOD INSECURITY: Z59.41

## 2025-09-05 ASSESSMENT — PATIENT HEALTH QUESTIONNAIRE - PHQ9
SUM OF ALL RESPONSES TO PHQ9 QUESTIONS 1 AND 2: 0
1. LITTLE INTEREST OR PLEASURE IN DOING THINGS: NOT AT ALL
2. FEELING DOWN, DEPRESSED OR HOPELESS: NOT AT ALL

## 2025-09-05 ASSESSMENT — PAIN SCALES - GENERAL: PAINLEVEL_OUTOF10: 0-NO PAIN

## 2025-09-11 ENCOUNTER — APPOINTMENT (OUTPATIENT)
Dept: SURGERY | Facility: CLINIC | Age: 40
End: 2025-09-11
Payer: COMMERCIAL

## (undated) DEVICE — RELOAD, SUREFORM STAPLER 60, 4.6 BLACK, DAVINCI XI

## (undated) DEVICE — SEAMGUARD, SUREFORM 60, GREEN//BLUE, FOR ROBOTIC ENDO

## (undated) DEVICE — DRAPE, ARM XI

## (undated) DEVICE — TROCAR, KII OPTICAL BLADELESS 5MM Z THREAD 100MM LNGTH

## (undated) DEVICE — SUTURE, VICRYL, 2-0, 27 IN, BR/SH 27, VIOLET

## (undated) DEVICE — RELOAD, SUREFORM STAPLER 60, 4.3 GREEN, DAVINCI XI

## (undated) DEVICE — SUTURE, V-LOC PBT 2-0, 6 IN , V20, BLUE, NONABS

## (undated) DEVICE — CARE KIT, LAPAROSCOPIC, ADVANCED

## (undated) DEVICE — BINDER, ABDOMINAL, 3 PANEL, 9 X 46-62 IN, MED/LG

## (undated) DEVICE — Device

## (undated) DEVICE — GRASPER, FENESTRATED TIP-UP XI

## (undated) DEVICE — TUBING, INSUFFLATION, W/ .3 MICRO FILTER & ADAPTER

## (undated) DEVICE — NEEDLE, HYPODERMIC, PROEDGE, 22G X 1.5, BLACK

## (undated) DEVICE — SEAL, UNIVERSAL, 5-12MM

## (undated) DEVICE — POSITIONING, THE PINK PAD, PIGAZZI SYSTEM

## (undated) DEVICE — ELECTRODE, EZ CLEAN LAPAROSCOPIC L-WIRE, 33CM, DISP

## (undated) DEVICE — SEAMGUARD, SUREFORM 60, BLACK, FOR ROBOTIC ENDO

## (undated) DEVICE — SEALER, VESSEL, EXTENDED

## (undated) DEVICE — SYRINGE, 20 CC, LUER SLIP

## (undated) DEVICE — FORCEPS, BIPOLAR FENESTRATED XI

## (undated) DEVICE — LUBRICANT, ELECTROLUBE, F/ELECTRODE TIPS

## (undated) DEVICE — SYSTEM, GASTRECTOMY SLEEVE, 36FR W/O BULB, VISIGI 3D

## (undated) DEVICE — PUMP, STRYKERFLOW 2 & HANDPIECE W/10FT. IRRIGATION TUBING

## (undated) DEVICE — STAPLER, SUREFORM 60, DAVINCI XI

## (undated) DEVICE — GLOVE, SURGICAL, PROTEXIS PI ORTHO, 7.0, PF, LF

## (undated) DEVICE — SUTURE, VICRYL, 0, 36 IN, CT-1, UNDYED

## (undated) DEVICE — ADHESIVE, SKIN, DERMABOND ADVANCED, 15CM, PEN-STYLE

## (undated) DEVICE — OBTURATOR, BLADELESS , SU

## (undated) DEVICE — SHIELD, PRE-KLENZ, SOAK, MED 6ML

## (undated) DEVICE — RELOAD, SUREFORM STAPLER 60, 3.5 BLUE, DAVINCI XI

## (undated) DEVICE — SYRINGE, 60 CC, IRRIGATION, BULB, CONTRO-BULB, PAPER POUCH

## (undated) DEVICE — DRIVER, NEEDLE, MEGA SUTURE CUT, DAVINCI XI

## (undated) DEVICE — SUTURE, MONOCRYL, 4-0, 18 IN, PS2, UNDYED